# Patient Record
Sex: FEMALE | Race: WHITE | NOT HISPANIC OR LATINO | Employment: UNEMPLOYED | URBAN - METROPOLITAN AREA
[De-identification: names, ages, dates, MRNs, and addresses within clinical notes are randomized per-mention and may not be internally consistent; named-entity substitution may affect disease eponyms.]

---

## 2017-01-12 RX ORDER — ALENDRONATE SODIUM 70 MG/1
TABLET ORAL
Qty: 4 TABLET | Refills: 0 | OUTPATIENT
Start: 2017-01-12

## 2017-01-13 NOTE — TELEPHONE ENCOUNTER
Please contact patient.  She has not been seen in the office in more than 2 years.  She needs mammogram, repeat bone density test, and multiple vaccines.

## 2017-01-21 ENCOUNTER — PATIENT MESSAGE (OUTPATIENT)
Dept: FAMILY MEDICINE | Facility: CLINIC | Age: 62
End: 2017-01-21

## 2017-01-23 ENCOUNTER — PATIENT MESSAGE (OUTPATIENT)
Dept: FAMILY MEDICINE | Facility: CLINIC | Age: 62
End: 2017-01-23

## 2018-07-05 ENCOUNTER — OFFICE VISIT (OUTPATIENT)
Dept: URGENT CARE | Facility: CLINIC | Age: 63
End: 2018-07-05
Payer: MEDICARE

## 2018-07-05 VITALS
OXYGEN SATURATION: 98 % | HEIGHT: 64 IN | BODY MASS INDEX: 18.78 KG/M2 | TEMPERATURE: 99 F | RESPIRATION RATE: 18 BRPM | SYSTOLIC BLOOD PRESSURE: 136 MMHG | WEIGHT: 110 LBS | HEART RATE: 67 BPM | DIASTOLIC BLOOD PRESSURE: 72 MMHG

## 2018-07-05 DIAGNOSIS — M79.18 BUTTOCK PAIN: ICD-10-CM
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
## 2018-07-05 DIAGNOSIS — M54.32 SCIATICA OF LEFT SIDE: ICD-10-CM

## 2018-07-05 DIAGNOSIS — S80.12XA CONTUSION OF LEFT LOWER LEG, INITIAL ENCOUNTER: ICD-10-CM

## 2018-07-05 PROCEDURE — 99214 OFFICE O/P EST MOD 30 MIN: CPT | Mod: 25,S$GLB,, | Performed by: NURSE PRACTITIONER

## 2018-07-05 PROCEDURE — 96372 THER/PROPH/DIAG INJ SC/IM: CPT | Mod: S$GLB,,, | Performed by: NURSE PRACTITIONER

## 2018-07-05 RX ORDER — METHOCARBAMOL 500 MG/1
500 TABLET, FILM COATED ORAL 4 TIMES DAILY
Qty: 30 TABLET | Refills: 0 | Status: SHIPPED | OUTPATIENT
Start: 2018-07-05 | End: 2018-07-06 | Stop reason: SDUPTHER

## 2018-07-05 RX ORDER — ETODOLAC 400 MG/1
400 TABLET, FILM COATED ORAL EVERY 8 HOURS PRN
Qty: 21 TABLET | Refills: 0 | Status: SHIPPED | OUTPATIENT
Start: 2018-07-05 | End: 2018-07-12

## 2018-07-05 RX ORDER — KETOROLAC TROMETHAMINE 30 MG/ML
30 INJECTION, SOLUTION INTRAMUSCULAR; INTRAVENOUS
Status: COMPLETED | OUTPATIENT
Start: 2018-07-05 | End: 2018-07-05

## 2018-07-05 RX ADMIN — KETOROLAC TROMETHAMINE 30 MG: 30 INJECTION, SOLUTION INTRAMUSCULAR; INTRAVENOUS at 12:07

## 2018-07-05 NOTE — PROGRESS NOTES
"Subjective:       Patient ID: Niya Durbin is a 63 y.o. female.    Vitals:  height is 5' 4" (1.626 m) and weight is 49.9 kg (110 lb). Her temperature is 98.8 °F (37.1 °C). Her blood pressure is 136/72 and her pulse is 67. Her respiration is 18 and oxygen saturation is 98%.     Chief Complaint: Hip Pain    Pt c/o left hip pain radiating into buttock and left upper and lower leg since 9:30 pm last night after trip and fall over coffee table.  She denies hitting her head.  Denies headache, neck pain, or back pain.  She is able to bear weight on the extremity but this is painful.  Pain also occurs with bending or reaching.  Denies history of Sciatica.  Denies numbness, weakness, or loss of bowel/bladder control.        Hip Pain    The incident occurred 6 to 12 hours ago. The incident occurred at home. The injury mechanism was a direct blow and a fall. The pain is present in the left hip and left leg. The quality of the pain is described as stabbing and burning. The pain is at a severity of 10/10. The pain is severe. The pain has been fluctuating since onset. Pertinent negatives include no inability to bear weight, loss of motion, loss of sensation, muscle weakness, numbness or tingling. She reports no foreign bodies present. The symptoms are aggravated by movement and weight bearing. She has tried NSAIDs (200 mg Advil last night) for the symptoms. The treatment provided no relief.     Review of Systems   Constitution: Negative for weakness and malaise/fatigue.   HENT: Negative for nosebleeds.    Cardiovascular: Negative for chest pain and syncope.   Respiratory: Negative for shortness of breath.    Musculoskeletal: Positive for falls. Negative for back pain, joint pain and neck pain.   Gastrointestinal: Negative for abdominal pain.   Genitourinary: Negative for hematuria.   Neurological: Negative for dizziness, numbness and tingling.       Objective:      Physical Exam   Constitutional: She is oriented to person, " place, and time. Vital signs are normal. She appears well-developed and well-nourished. She is active and cooperative. No distress.   HENT:   Head: Normocephalic and atraumatic.   Nose: Nose normal.   Mouth/Throat: Oropharynx is clear and moist and mucous membranes are normal.   Eyes: Conjunctivae and lids are normal.   Neck: Trachea normal, normal range of motion, full passive range of motion without pain and phonation normal. Neck supple.   Cardiovascular: Normal rate, regular rhythm, normal heart sounds, intact distal pulses and normal pulses.    Pulmonary/Chest: Effort normal and breath sounds normal.   Abdominal: Soft. Normal appearance and bowel sounds are normal. She exhibits no abdominal bruit, no pulsatile midline mass and no mass.   Musculoskeletal: She exhibits no edema or deformity.        Cervical back: Normal.        Thoracic back: Normal.        Lumbar back: She exhibits pain and spasm. She exhibits normal range of motion, no tenderness, no bony tenderness, no swelling, no edema, no deformity, no laceration and normal pulse.        Back:    Pain with flexion.  Full ROM.  + Straight Leg Raise   Neurological: She is alert and oriented to person, place, and time. She has normal strength and normal reflexes. She displays no atrophy and no tremor. No cranial nerve deficit or sensory deficit. She exhibits normal muscle tone. Gait abnormal. Coordination normal.   Antalgic Gait   Skin: Skin is warm, dry and intact. Bruising noted. No abrasion, no ecchymosis and no laceration noted. She is not diaphoretic.        Psychiatric: She has a normal mood and affect. Her speech is normal and behavior is normal. Judgment and thought content normal. Cognition and memory are normal.   Nursing note and vitals reviewed.      X-ray Hip 2 Or 3 Views Left    Result Date: 7/5/2018  EXAMINATION: XR HIP 2 VIEW LEFT; XR FEMUR 2 VIEW LEFT CLINICAL HISTORY: Unspecified fall, initial encounter TECHNIQUE: AP view of the pelvis and  frog leg lateral view of the left hip and AP and lateral views of the left femur were performed. COMPARISON: None FINDINGS: Overall alignment is within normal limits.  No displaced fracture, dislocation or destructive osseous process.  Minimal degenerative change noted about the pelvis, bilateral hips and knee.  There is pseudoarthrosis at right L5-S1.  No subcutaneous emphysema or radiodense retained foreign body.     No acute displaced fracture-dislocation identified. Electronically signed by: Tejas Marks MD Date:    07/05/2018 Time:    12:15    X-ray Femur 2 View Left    Result Date: 7/5/2018  EXAMINATION: XR HIP 2 VIEW LEFT; XR FEMUR 2 VIEW LEFT CLINICAL HISTORY: Unspecified fall, initial encounter TECHNIQUE: AP view of the pelvis and frog leg lateral view of the left hip and AP and lateral views of the left femur were performed. COMPARISON: None FINDINGS: Overall alignment is within normal limits.  No displaced fracture, dislocation or destructive osseous process.  Minimal degenerative change noted about the pelvis, bilateral hips and knee.  There is pseudoarthrosis at right L5-S1.  No subcutaneous emphysema or radiodense retained foreign body.     No acute displaced fracture-dislocation identified. Electronically signed by: Tejas Marks MD Date:    07/05/2018 Time:    12:15  Assessment:       1. Fall, initial encounter    2. Buttock pain    3. Contusion of left lower leg, initial encounter    4. Sciatica of left side        Plan:         Fall, initial encounter  -     X-Ray Hip 2 or 3 views Left; Future; Expected date: 07/05/2018  -     X-Ray Femur 2 View Left; Future; Expected date: 07/05/2018    Buttock pain  -     ketorolac injection 30 mg; Inject 1 mL (30 mg total) into the muscle one time.  -     X-Ray Femur 2 View Left; Future; Expected date: 07/05/2018  -     methocarbamol (ROBAXIN) 500 MG Tab; Take 1 tablet (500 mg total) by mouth 4 (four) times daily. As needed for muscle spasm. May cause drowsiness  for 10 days  Dispense: 30 tablet; Refill: 0  -     etodolac (LODINE) 400 MG tablet; Take 1 tablet (400 mg total) by mouth every 8 (eight) hours as needed.  Dispense: 21 tablet; Refill: 0    Contusion of left lower leg, initial encounter  -     X-Ray Hip 2 or 3 views Left; Future; Expected date: 07/05/2018  -     X-Ray Femur 2 View Left; Future; Expected date: 07/05/2018    Sciatica of left side  -     methocarbamol (ROBAXIN) 500 MG Tab; Take 1 tablet (500 mg total) by mouth 4 (four) times daily. As needed for muscle spasm. May cause drowsiness for 10 days  Dispense: 30 tablet; Refill: 0  -     etodolac (LODINE) 400 MG tablet; Take 1 tablet (400 mg total) by mouth every 8 (eight) hours as needed.  Dispense: 21 tablet; Refill: 0      Patient Instructions                                                              Ortho   If your condition worsens or fails to improve we recommend that you receive another evaluation at the ER immediately or contact your PCP to discuss your concerns or return here. You must understand that you've received an urgent care treatment only and that you may be released before all your medical problems are known or treated. You the patient will arrange for follouw care as instructed.   If you were prescribed a narcotic or muscle relaxant do not drive or operate heavy machinery while taking these medications   Tylenol or ibuprofen can also be used as directed for pain unless you have an allergy to them or medical condition such as stomach ulcers, kidney or liver disease or blood thinners etc for which you should not be taking these type of medications.   If you were given a prescription NSAID here do not also take any over the counter NSAID like ibuprofen, aleve, advil, motrin etc   RICE which means rest, ice compression and elevation are helpful.   If you have Low Back Pain and develop bowel or bladder symptoms or increase pain going down your legs go to the ER immediately.        Sciatica    Sciatica is a condition that causes pain in the lower back that spreads down into the buttock, hip, and leg. Sometimes the leg pain can happen without any back pain. Sciatica happens when a spinal nerve is irritated or has pressure put on it as comes out of the spinal canal in the lower back. This most often happens when a bulge or rupture of a nearby spinal disk presses on the nerve. Sciatica can also be caused by a narrowing of the spinal canal (spinal stenosis) or spasm of the muscle in the buttocks that the sciatic nerve passes through (pyriform muscle). Sciatica is also called lumbar radiculopathy.  Sciatica may begin after a sudden twisting or bending force, such as in a car accident. Or it can happen after a simple awkward movement. In either case, muscle spasm often also happens. Muscle spasm makes the pain worse.  A healthcare provider makes a diagnosis of sciatica from your symptoms and a physical exam. Unless you had an injury from a car accident or fall, you usually wont have X-rays taken at this time. This is because the nerves and disks in your back cant be seen on an X-ray. If the provider sees signs of a compressed nerve, you will need to schedule an MRI scan as an outpatient. Signs of a compressed nerve include loss of strength in a leg.  Most sciatica gets better with medicine, exercise, and physical therapy. If your symptoms continue after at least 3 months of medical treatment, you may need surgery or injections to your lower back.  Home care  Follow these tips when caring for yourself at home:  · You may need to stay in bed the first few days. But as soon as possible, begin sitting up or walking. This will help you avoid problems that come from staying in bed for long periods.  · When in bed, try to find a position that is comfortable. A firm mattress is best. Try lying flat on your back with pillows under your knees. You can also try lying on your side with your knees bent  up toward your chest and a pillow between your knees.  · Avoid sitting for long periods. This puts more stress on your lower back than standing or walking.  · Use heat from a hot shower, hot bath, or heating pad to help ease pain. Massage can also help. You can also try using an ice pack. You can make your own ice pack by putting ice cubes in a plastic bag. Wrap the bag in a thin towel. Try both heat and cold to see which works best. Use the method that feels best for 20 minutes several times a day.  · You may use acetaminophen or ibuprofen to ease pain, unless another pain medicine was prescribed. Note: If you have chronic liver or kidney disease, talk with your healthcare provider before taking these medicines. Also talk with your provider if youve had a stomach ulcer or gastrointestinal bleeding.  · Use safe lifting methods. Dont lift anything heavier than 15 pounds until all of the pain is gone.  Follow-up care  Follow up with your healthcare provider, or as advised. You may need physical therapy or additional tests.  If X-rays were taken, a radiologist will look at them. You will be told of any new findings that may affect your care.  When to seek medical advice  Call your healthcare provider right away if any of these occur:  · Pain gets worse even after taking prescribed medicine  · Weakness or numbness in 1 or both legs or hips  · Numbness in your groin or genital area  · You cant control your bowel or bladder  · Fever  · Redness or swelling over your back or spine   Date Last Reviewed: 8/1/2016  © 0935-2174 [employer]. [address] [place] [address], [place] [address]. All rights reserved. This information is not intended as a substitute for professional medical care. Always follow your healthcare professional's instructions.

## 2018-07-05 NOTE — PATIENT INSTRUCTIONS
Ortho   If your condition worsens or fails to improve we recommend that you receive another evaluation at the ER immediately or contact your PCP to discuss your concerns or return here. You must understand that you've received an urgent care treatment only and that you may be released before all your medical problems are known or treated. You the patient will arrange for follouwp care as instructed.   If you were prescribed a narcotic or muscle relaxant do not drive or operate heavy machinery while taking these medications   Tylenol or ibuprofen can also be used as directed for pain unless you have an allergy to them or medical condition such as stomach ulcers, kidney or liver disease or blood thinners etc for which you should not be taking these type of medications.   If you were given a prescription NSAID here do not also take any over the counter NSAID like ibuprofen, aleve, advil, motrin etc   RICE which means rest, ice compression and elevation are helpful.   If you have Low Back Pain and develop bowel or bladder symptoms or increase pain going down your legs go to the ER immediately.       Sciatica    Sciatica is a condition that causes pain in the lower back that spreads down into the buttock, hip, and leg. Sometimes the leg pain can happen without any back pain. Sciatica happens when a spinal nerve is irritated or has pressure put on it as comes out of the spinal canal in the lower back. This most often happens when a bulge or rupture of a nearby spinal disk presses on the nerve. Sciatica can also be caused by a narrowing of the spinal canal (spinal stenosis) or spasm of the muscle in the buttocks that the sciatic nerve passes through (pyriform muscle). Sciatica is also called lumbar radiculopathy.  Sciatica may begin after a sudden twisting or bending force, such as in a car accident. Or it can happen after a simple awkward movement. In either case,  muscle spasm often also happens. Muscle spasm makes the pain worse.  A healthcare provider makes a diagnosis of sciatica from your symptoms and a physical exam. Unless you had an injury from a car accident or fall, you usually wont have X-rays taken at this time. This is because the nerves and disks in your back cant be seen on an X-ray. If the provider sees signs of a compressed nerve, you will need to schedule an MRI scan as an outpatient. Signs of a compressed nerve include loss of strength in a leg.  Most sciatica gets better with medicine, exercise, and physical therapy. If your symptoms continue after at least 3 months of medical treatment, you may need surgery or injections to your lower back.  Home care  Follow these tips when caring for yourself at home:  · You may need to stay in bed the first few days. But as soon as possible, begin sitting up or walking. This will help you avoid problems that come from staying in bed for long periods.  · When in bed, try to find a position that is comfortable. A firm mattress is best. Try lying flat on your back with pillows under your knees. You can also try lying on your side with your knees bent up toward your chest and a pillow between your knees.  · Avoid sitting for long periods. This puts more stress on your lower back than standing or walking.  · Use heat from a hot shower, hot bath, or heating pad to help ease pain. Massage can also help. You can also try using an ice pack. You can make your own ice pack by putting ice cubes in a plastic bag. Wrap the bag in a thin towel. Try both heat and cold to see which works best. Use the method that feels best for 20 minutes several times a day.  · You may use acetaminophen or ibuprofen to ease pain, unless another pain medicine was prescribed. Note: If you have chronic liver or kidney disease, talk with your healthcare provider before taking these medicines. Also talk with your provider if youve had a stomach ulcer or  gastrointestinal bleeding.  · Use safe lifting methods. Dont lift anything heavier than 15 pounds until all of the pain is gone.  Follow-up care  Follow up with your healthcare provider, or as advised. You may need physical therapy or additional tests.  If X-rays were taken, a radiologist will look at them. You will be told of any new findings that may affect your care.  When to seek medical advice  Call your healthcare provider right away if any of these occur:  · Pain gets worse even after taking prescribed medicine  · Weakness or numbness in 1 or both legs or hips  · Numbness in your groin or genital area  · You cant control your bowel or bladder  · Fever  · Redness or swelling over your back or spine   Date Last Reviewed: 8/1/2016  © 2262-9626 [employer]. [address] [place] [address], [place] [address]. All rights reserved. This information is not intended as a substitute for professional medical care. Always follow your healthcare professional's instructions.

## 2018-07-06 ENCOUNTER — TELEPHONE (OUTPATIENT)
Dept: URGENT CARE | Facility: CLINIC | Age: 63
End: 2018-07-06

## 2018-07-06 DIAGNOSIS — M79.18 BUTTOCK PAIN: ICD-10-CM

## 2018-07-06 DIAGNOSIS — M54.32 SCIATICA OF LEFT SIDE: ICD-10-CM

## 2018-07-06 RX ORDER — TIZANIDINE 4 MG/1
4 TABLET ORAL 3 TIMES DAILY PRN
Qty: 30 TABLET | Refills: 0 | Status: SHIPPED | OUTPATIENT
Start: 2018-07-06 | End: 2018-07-24

## 2018-07-06 RX ORDER — METHOCARBAMOL 500 MG/1
500 TABLET, FILM COATED ORAL 4 TIMES DAILY
Qty: 30 TABLET | Refills: 0 | Status: SHIPPED | OUTPATIENT
Start: 2018-07-06 | End: 2018-07-16

## 2018-07-06 NOTE — TELEPHONE ENCOUNTER
Pt called [facility] is currently out of stock (backorder) of Robaxin. Called [facility]an[facility]lex in for patient

## 2018-07-13 ENCOUNTER — TELEPHONE (OUTPATIENT)
Dept: URGENT CARE | Facility: CLINIC | Age: 63
End: 2018-07-13
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
## 2018-07-13 NOTE — TELEPHONE ENCOUNTER
I spoke with patient in regards to this.  She now has pain in her inner groin/thigh with increasing bruising.  I last saw her over a week ago.  I advised her to get rechecked as she now feels like this is different and more of a strain to her muscle than Sciatica.  She states that she's walking better and does not need to come in today for new visit but that she would appreciate a referral through Ortho.   called to schedule.       ----- Message from Mayra Benedict MA sent at 7/13/2018  2:50 PM CDT -----  Contact: Patient  Patient has concern of her bruise getting bigger. Patient was seen 7/5

## 2018-07-24 ENCOUNTER — HOSPITAL ENCOUNTER (OUTPATIENT)
Dept: RADIOLOGY | Facility: HOSPITAL | Age: 63
Discharge: HOME | End: 2018-07-24
Attending: PHYSICIAN ASSISTANT
Payer: MEDICARE

## 2018-07-24 ENCOUNTER — OFFICE VISIT (OUTPATIENT)
Dept: ORTHOPEDICS | Facility: CLINIC | Age: 63
End: 2018-07-24
Payer: MEDICARE

## 2018-07-24 VITALS — WEIGHT: 125.88 LBS | BODY MASS INDEX: 21.49 KG/M2 | HEIGHT: 64 IN

## 2018-07-24 DIAGNOSIS — M25.552 LEFT HIP PAIN: ICD-10-CM

## 2018-07-24 DIAGNOSIS — M25.552 LEFT HIP PAIN: Primary | ICD-10-CM

## 2018-07-24 PROCEDURE — 99203 OFFICE O/P NEW LOW 30 MIN: CPT | Mod: S$PBB,,, | Performed by: PHYSICIAN ASSISTANT

## 2018-07-24 PROCEDURE — 72195 MRI PELVIS W/O DYE: CPT | Mod: 26,,, | Performed by: RADIOLOGY

## 2018-07-24 PROCEDURE — 72195 MRI PELVIS W/O DYE: CPT | Mod: TC

## 2018-07-24 PROCEDURE — 99999 PR PBB SHADOW E&M-EST. PATIENT-LVL III: CPT | Mod: PBBFAC,,, | Performed by: PHYSICIAN ASSISTANT

## 2018-07-24 PROCEDURE — 99213 OFFICE O/P EST LOW 20 MIN: CPT | Mod: PBBFAC,25 | Performed by: PHYSICIAN ASSISTANT

## 2018-07-24 NOTE — LETTER
July 24, 2018      Aylin Celis, NP  [address] [facility] [place] [address]           [facility]  [address] [facility] [address]  Phone: [phone]  Fax: [phone]          Patient: Niya Durbin   MR Number: [id number]   YOB: 1955   Date of Visit: 7/24/2018       Dear Aylin Celis:    Thank you for referring Niya Durbin to me for evaluation. Attached you will find relevant portions of my assessment and plan of care.    If you have questions, please do not hesitate to call me. I look forward to following Niya Durbin along with you.    Sincerely,    Jeannie Page PA-C    Enclosure  CC:  No Recipients    If you would like to receive this communication electronically, please contact [email or web address] or [phone] to request more information on [employer] access.    For providers and/or their staff who would like to refer a patient to [facility], please contact us through our one-stop-shop provider referral line, [facility], at [phone].    If you feel you have received this communication in error or would no longer like to receive these types of communications, please e-mail [email or web address]

## 2018-07-24 NOTE — PROGRESS NOTES
Subjective:      Patient ID: Niya Durbin is a 63 y.o. female.    Chief Complaint: Pain of the Left Thigh    HPI  63 year old female presents with chief complaint of left hip and thigh pain since 7/4/18. She fell in her house. She could barely walk initially. She went to urgent care the next day and x-rays were negative for fx. She reports most of her pain at the groin and inner thigh. She has taken muscle relaxer and nsaids with little relief. She can't lay on her left side. She is using a crutch to walk. She reports bruising at the thigh. She reports LBP.   Review of Systems   Constitution: Negative for chills, fever and night sweats.   Cardiovascular: Negative for chest pain.   Respiratory: Negative for cough and shortness of breath.    Hematologic/Lymphatic: Does not bruise/bleed easily.   Gastrointestinal: Negative for heartburn.   Genitourinary: Negative for dysuria.   Neurological: Negative for numbness and paresthesias.   Psychiatric/Behavioral: Negative for altered mental status.   Allergic/Immunologic: Negative for persistent infections.         Objective:            General    Vitals reviewed.  Constitutional: She is oriented to person, place, and time. She appears well-developed and well-nourished.   Cardiovascular: Normal rate.    Neurological: She is alert and oriented to person, place, and time.         Left Hip Exam     Inspection   Bruising: present (thigh)    Tenderness   The patient tender to palpation of the trochanteric bursa.    Range of Motion   The patient has normal left hip ROM.    Tests   Stinchfield test: positive  Log Roll: negative    Other   Sensation: normal          Vascular Exam       Left Pulses  Dorsalis Pedis:      2+              X-ray: reviewed by myself. Overall alignment is within normal limits.  No displaced fracture, dislocation or destructive osseous process.  Minimal degenerative change noted about the pelvis, bilateral hips.        Assessment:       Encounter  Diagnosis   Name Primary?    Left hip pain Yes          Plan:       MRI was ordered to r/o occult hip fracture. RTC pending results.

## 2018-07-26 ENCOUNTER — TELEPHONE (OUTPATIENT)
Dept: ORTHOPEDICS | Facility: CLINIC | Age: 63
End: 2018-07-26

## 2018-07-26 ENCOUNTER — HOSPITAL ENCOUNTER (OUTPATIENT)
Dept: RADIOLOGY | Facility: HOSPITAL | Age: 63
Discharge: HOME | End: 2018-07-26
Attending: PHYSICIAN ASSISTANT
Payer: MEDICARE

## 2018-07-26 DIAGNOSIS — M84.48XA BILATERAL SACRAL INSUFFICIENCY FRACTURE, INITIAL ENCOUNTER: ICD-10-CM

## 2018-07-26 DIAGNOSIS — M84.48XA BILATERAL SACRAL INSUFFICIENCY FRACTURE, INITIAL ENCOUNTER: Primary | ICD-10-CM

## 2018-07-26 DIAGNOSIS — S32.402A CLOSED NONDISPLACED FRACTURE OF LEFT ACETABULUM, UNSPECIFIED PORTION OF ACETABULUM, INITIAL ENCOUNTER: Primary | ICD-10-CM

## 2018-07-26 PROCEDURE — 72192 CT PELVIS W/O DYE: CPT | Mod: TC

## 2018-07-26 PROCEDURE — 72192 CT PELVIS W/O DYE: CPT | Mod: 26,,, | Performed by: RADIOLOGY

## 2018-07-26 RX ORDER — HYDROCODONE BITARTRATE AND ACETAMINOPHEN 5; 325 MG/1; MG/1
1 TABLET ORAL EVERY 4 HOURS PRN
Qty: 42 TABLET | Refills: 0 | Status: SHIPPED | OUTPATIENT
Start: 2018-07-26 | End: 2018-08-05

## 2018-07-27 NOTE — TELEPHONE ENCOUNTER
Spoke to patient regarding MRI/CT results. She may WBAT. Prescription for Norco 5 mg provided. She says her pain is getting better. She will f/u in 4 weeks for pelvis x-rays.

## 2018-08-28 ENCOUNTER — OFFICE VISIT (OUTPATIENT)
Dept: ORTHOPEDICS | Facility: CLINIC | Age: 63
End: 2018-08-28
Attending: FAMILY MEDICINE
Payer: MEDICARE

## 2018-08-28 ENCOUNTER — HOSPITAL ENCOUNTER (OUTPATIENT)
Dept: RADIOLOGY | Facility: HOSPITAL | Age: 63
Discharge: HOME | End: 2018-08-28
Attending: PHYSICIAN ASSISTANT
Payer: MEDICARE

## 2018-08-28 ENCOUNTER — OFFICE VISIT (OUTPATIENT)
Dept: ORTHOPEDICS | Facility: CLINIC | Age: 63
End: 2018-08-28
Payer: MEDICARE

## 2018-08-28 VITALS — WEIGHT: 127.38 LBS | HEIGHT: 64 IN | BODY MASS INDEX: 21.75 KG/M2

## 2018-08-28 VITALS
DIASTOLIC BLOOD PRESSURE: 91 MMHG | SYSTOLIC BLOOD PRESSURE: 140 MMHG | HEIGHT: 64 IN | BODY MASS INDEX: 21.87 KG/M2 | HEART RATE: 55 BPM

## 2018-08-28 DIAGNOSIS — S32.402D CLOSED NONDISPLACED FRACTURE OF LEFT ACETABULUM WITH ROUTINE HEALING, UNSPECIFIED PORTION OF ACETABULUM, SUBSEQUENT ENCOUNTER: ICD-10-CM

## 2018-08-28 DIAGNOSIS — M81.6 LOCALIZED OSTEOPOROSIS OF LEQUESNE: ICD-10-CM

## 2018-08-28 DIAGNOSIS — M81.0 OSTEOPOROSIS, UNSPECIFIED OSTEOPOROSIS TYPE, UNSPECIFIED PATHOLOGICAL FRACTURE PRESENCE: ICD-10-CM

## 2018-08-28 DIAGNOSIS — S32.592D CLOSED FRACTURE OF RAMUS OF LEFT PUBIS WITH ROUTINE HEALING, SUBSEQUENT ENCOUNTER: ICD-10-CM

## 2018-08-28 DIAGNOSIS — S32.402D CLOSED NONDISPLACED FRACTURE OF LEFT ACETABULUM WITH ROUTINE HEALING, UNSPECIFIED PORTION OF ACETABULUM, SUBSEQUENT ENCOUNTER: Primary | ICD-10-CM

## 2018-08-28 DIAGNOSIS — M80.80XA PATHOLOGICAL FRACTURE DUE TO OSTEOPOROSIS, UNSPECIFIED FRACTURE SITE, UNSPECIFIED OSTEOPOROSIS TYPE, INITIAL ENCOUNTER: Primary | ICD-10-CM

## 2018-08-28 PROCEDURE — 99999 PR PBB SHADOW E&M-EST. PATIENT-LVL II: CPT | Mod: PBBFAC,,, | Performed by: PHYSICIAN ASSISTANT

## 2018-08-28 PROCEDURE — 99213 OFFICE O/P EST LOW 20 MIN: CPT | Mod: S$PBB,,, | Performed by: PHYSICIAN ASSISTANT

## 2018-08-28 PROCEDURE — 99212 OFFICE O/P EST SF 10 MIN: CPT | Mod: PBBFAC,25,27

## 2018-08-28 PROCEDURE — 72190 X-RAY EXAM OF PELVIS: CPT | Mod: TC

## 2018-08-28 PROCEDURE — 72190 X-RAY EXAM OF PELVIS: CPT | Mod: 26,,, | Performed by: RADIOLOGY

## 2018-08-28 PROCEDURE — 99214 OFFICE O/P EST MOD 30 MIN: CPT | Mod: 25,S$PBB,, | Performed by: PHYSICIAN ASSISTANT

## 2018-08-28 PROCEDURE — 99212 OFFICE O/P EST SF 10 MIN: CPT | Mod: PBBFAC,25 | Performed by: PHYSICIAN ASSISTANT

## 2018-08-28 NOTE — PROGRESS NOTES
SUBJECTIVE:     Chief Complaint & History of Present Illness:  Niya Durbin is a new patient 63 y.o. female who is seen here today for a bone health evaluation for osteoporosis, fracture prevention, and risk evaluation for future fractures.        she was appropriately identified and referred by Jeannie Page PA-C due to concerns for compromised bone quality, and risk of future fractures.  This visit is medically necessary to identify risk, investigate and initiative treatment as appropriate to improve bone quality and strength for the reduction of secondary fractures.     her medical history, medications and fracture history will be reviewed and summarized      Review of patient's allergies indicates:  No Known Allergies      No current outpatient medications on file.     No current facility-administered medications for this visit.        History reviewed. No pertinent past medical history.    Past Surgical History:   Procedure Laterality Date    APPENDECTOMY  age 10    TOTAL ABDOMINAL HYSTERECTOMY  age 35    w/ USO       Lab Results   Component Value Date     11/13/2014    K 4.9 11/13/2014     11/13/2014    CO2 29 11/13/2014    GLU 86 11/13/2014    BUN 17 11/13/2014    CREATININE 0.8 11/13/2014    CALCIUM 9.3 11/13/2014    PROT 6.9 11/13/2014    ALBUMIN 4.1 11/13/2014    BILITOT 0.8 11/13/2014    ALKPHOS 68 11/13/2014    AST 19 11/13/2014    ALT 16 11/13/2014    ANIONGAP 8 11/13/2014    ESTGFRAFRICA >60.0 11/13/2014    EGFRNONAA >60.0 11/13/2014      Lab Results   Component Value Date    WBC 3.24 (L) 11/13/2014    RBC 4.40 11/13/2014    HGB 13.3 11/13/2014    HCT 37.9 11/13/2014    MCV 86 11/13/2014    MCH 30.2 11/13/2014    MCHC 35.1 11/13/2014    RDW 13.2 11/13/2014     11/13/2014    MPV 9.4 11/13/2014    GRAN 1.7 (L) 11/13/2014    GRAN 52.5 11/13/2014    LYMPH 1.1 11/13/2014    LYMPH 32.7 11/13/2014    MONO 0.3 11/13/2014    MONO 10.2 11/13/2014    EOS 0.1 11/13/2014    BASO 0.02  11/13/2014    EOSINOPHIL 3.7 11/13/2014    BASOPHIL 0.6 11/13/2014    DIFFMETHOD Automated 11/13/2014      No results found for: MG   No results found for: PHOS   No results found for: [id number]   No results found for: PTH   Lab Results   Component Value Date    TSH 5.389 (H) 11/13/2014      Lab Results   Component Value Date    FREET4 0.92 11/13/2014      No results found for: HGBA1C, ESTIMATEDAVG   No results found for: FREETESTOSTE         Vital Signs (Most Recent)  Vitals:    08/28/18 1214   BP: (!) 140/91   Pulse: (!) 55         Today's Visit and Bone Health History  Have you had any loss of height or gotten shorter since your 20's?  1   Are you postmenopausal?  Surgical hysterectomy with overies removed   Please indicate at what age you became postmenopausal. hysterectomy and partial oophorectomy  in 1990   Have you ever taken any type of hormone replacement therapy? No   If you have had hormone replacement therapy please indicate which hormone therapy was used and for how long. n/a   Do you currently smoke? No   Have you ever smoked in the past? Yes   How many alcoholic beverages do you drink per day? 1 to 3   How many caffeinated beverages do you drink per day? 1 to 3   Have you had 2 or more falls in the past 12 months? No   How active have you been in the past 12 months? Somewhat active ( walk up to 1 mile per day )   Did either of your parents have a hip fracture after the age of 50? No   Have you ever been diagnosed with any of the following? Hypothyroidism   Do you currently have a fracture? Yes   If you currently have a fracture please indicate where and when the fracture occured. hip   Have you broken any other bones since you turned 50 or older? No   Please list all bones broken since you turned 50 or older. n/a   Have you ever had a bone density test or DXA scan? Yes   Are you currently taking or have you ever taken any of the following medications? None   Do you take Calcium? No   If you take  "Calcium what dose? n/a   Do you take Vitamin D? No   If you take Vitamin D what dose? n/a   Have you ever been diagnosed with cancer? No   Please indicate what type of cancer and when you were diagnosed. n/a   Have you ever been treated for cancer with high beam radiation or had radioactive implants? No   If you have been treated for cancer with high beam radiation or had radioactive implants please indicate what type.        she denies exposure to high beam radiation, or radioactive implants, no history of elevated calcium levels of Paget's disease.     she has medical history and medication use known to be associated with bone loss and osteoporosis to include pathological pelvic fracture previous diagnosis of osteoporosis     The last DXA was performed in 11/11/2014.          T-Score Hip: -2.8     T-Score Spine: -1.9      FRAX:            Review of Systems:  ROS:  Constitutional: no fever or chills  Eyes: no visual changes  ENT: no nasal congestion or sore throat  Respiratory: no respiratory symptoms  Cardiovascular: no chest pain or palpitations  Gastrointestinal: no nausea or vomiting, tolerating diet  Genitourinary: no hematuria or dysuria  Integument/Breast: no rash or pruritis  Hematologic/Lymphatic: no easy bruising or lymphadenopathy  Musculoskeletal: no arthralgias or myalgias  Neurological: no seizures or tremors  Behavioral/Psych: no auditory or visual hallucinations  Endocrine: no heat or cold intolerance      OBJECTIVE:     PHYSICAL EXAM:  Height: 5' 4" (162.6 cm)  ,                  General: no acute distress and well developed/well nourished  Behavioral/Psych: normal judgment and insight  Skin: no rash  Head/Neck: atraumatic, normocephalic, without obvious abnormality  Respiratory: normal respiratory effort  Cardiac: regular rate and rhythm  Vascular: pulses present  Abdomen: soft, non-tender  Musculoskeletal: no joint tenderness, deformity or swelling               ASSESSMENT/PLAN: "     Assessment:    Osteoporosis, at high risk for future fractures, history of pathological pelvic fracture.    Plan:   30-45 minutes spent in face-to-face consultation with patient and her family members today, discussing the disease management of osteoporosis, for the reduction of future fractures.  I have explained that bone strength is equal to bone quality. A bone density / DEXA scan is important to complement her care since her fracture was by definition a fragility fracture/traumatic fracture.  Over half of the encounter was spent (50%) counseling the patient on the disease of osteoporosis evidence-based and best practice treatment options available as well as recommendations for improvement of bone quality, the importance of nutritional supplements to include:  Calcium 4627-3036 mg daily in divided doses   Vitamin D3  6347-0484 units daily in divided doses.   Fall prevention and personal safety for the reduction of future fractures.    Clinicians Guidelines for the treatment of Osteoporosis 2017 as part of the National Osteoporosis Foundation were utilized as the evidence-based information provided.    Discussed pharmaceutical treatment options to include Biphosphatases, Denosumab, Abaloparatide and Teriparatide. Information to include indications for therapy, risks and benefits to treatment, and important safety information related to these treatments was provided and discussed.  Handouts were given to the patient for her review on osteoporosis and other pharmacological treatment information related to other available treatment options.    The importance of diet, impact exercise, and core strengthening with gait and balance exercise, through  both formal physical therapy programs and home exercise programs was discussed.     Bone density test recommended and ordered  Bone health labs recommended and ordered    The will see her back following testing to discuss treatment options

## 2018-08-28 NOTE — PROGRESS NOTES
Subjective:      Patient ID: Niya Durbin is a 63 y.o. female.    Chief Complaint: No chief complaint on file.    HPI  Patient returns for f/u for her left acetabulum and pubic ramus fractures that occurred on 7/4/18. Her pain has gotten better since last visit. She still reports some soreness at the groin and intermittent LBP. She has not been using the crutches much in the past 5 days. She hasn't taken the Norco in the past 5 days.   Review of Systems   Constitution: Negative for chills, fever and night sweats.   Cardiovascular: Negative for chest pain.   Respiratory: Negative for cough and shortness of breath.    Hematologic/Lymphatic: Does not bruise/bleed easily.   Skin: Negative for color change.   Gastrointestinal: Negative for heartburn.   Genitourinary: Negative for dysuria.   Neurological: Negative for numbness and paresthesias.   Psychiatric/Behavioral: Negative for altered mental status.   Allergic/Immunologic: Negative for persistent infections.         Objective:            General    Vitals reviewed.  Constitutional: She is oriented to person, place, and time. She appears well-developed and well-nourished.   Cardiovascular: Normal rate.    Neurological: She is alert and oriented to person, place, and time.         Left Hip Exam     Range of Motion   The patient has normal left hip ROM.    Tests   Stinchfield test: positive (mild)  Log Roll: negative    Other   Sensation: normal    Comments:  Mild pain with hip ROM.           Vascular Exam       Left Pulses  Dorsalis Pedis:      2+              X-ray: ordered and reviewed by myself. Healing left acetabular fracture and fracture of the left pubic ramus identified as before.  The position alignment is satisfactory.  Mild DJD.  No bone destruction.        Assessment:       Encounter Diagnoses   Name Primary?    Closed nondisplaced fracture of left acetabulum with routine healing, unspecified portion of acetabulum, subsequent encounter Yes    Closed  fracture of ramus of left pubis with routine healing, subsequent encounter           Plan:       Patient's pain is getting better. She declined refill of [place]. She declined cane/walker. Continue WBAT. May take tylenol prn. RTC in 4 weeks for repeat pelvis x-ray.

## 2018-09-25 ENCOUNTER — OFFICE VISIT (OUTPATIENT)
Dept: ORTHOPEDICS | Facility: CLINIC | Age: 63
End: 2018-09-25
Payer: MEDICARE

## 2018-09-25 ENCOUNTER — HOSPITAL ENCOUNTER (OUTPATIENT)
Dept: RADIOLOGY | Facility: HOSPITAL | Age: 63
Discharge: HOME | End: 2018-09-25
Attending: PHYSICIAN ASSISTANT
Payer: MEDICARE

## 2018-09-25 ENCOUNTER — HOSPITAL ENCOUNTER (OUTPATIENT)
Dept: RADIOLOGY | Facility: CLINIC | Age: 63
Discharge: HOME | End: 2018-09-25
Attending: PHYSICIAN ASSISTANT
Payer: MEDICARE

## 2018-09-25 VITALS
WEIGHT: 128.88 LBS | HEART RATE: 62 BPM | DIASTOLIC BLOOD PRESSURE: 92 MMHG | HEIGHT: 64 IN | SYSTOLIC BLOOD PRESSURE: 157 MMHG | BODY MASS INDEX: 22 KG/M2

## 2018-09-25 DIAGNOSIS — S32.592D CLOSED FRACTURE OF RAMUS OF LEFT PUBIS WITH ROUTINE HEALING, SUBSEQUENT ENCOUNTER: ICD-10-CM

## 2018-09-25 DIAGNOSIS — S32.402D CLOSED NONDISPLACED FRACTURE OF LEFT ACETABULUM WITH ROUTINE HEALING, UNSPECIFIED PORTION OF ACETABULUM, SUBSEQUENT ENCOUNTER: ICD-10-CM

## 2018-09-25 DIAGNOSIS — M81.0 OSTEOPOROSIS, UNSPECIFIED OSTEOPOROSIS TYPE, UNSPECIFIED PATHOLOGICAL FRACTURE PRESENCE: ICD-10-CM

## 2018-09-25 DIAGNOSIS — S32.402D CLOSED NONDISPLACED FRACTURE OF LEFT ACETABULUM WITH ROUTINE HEALING, UNSPECIFIED PORTION OF ACETABULUM, SUBSEQUENT ENCOUNTER: Primary | ICD-10-CM

## 2018-09-25 DIAGNOSIS — M80.80XA PATHOLOGICAL FRACTURE DUE TO OSTEOPOROSIS, UNSPECIFIED FRACTURE SITE, UNSPECIFIED OSTEOPOROSIS TYPE, INITIAL ENCOUNTER: Primary | ICD-10-CM

## 2018-09-25 DIAGNOSIS — M81.6 LOCALIZED OSTEOPOROSIS OF LEQUESNE: ICD-10-CM

## 2018-09-25 DIAGNOSIS — M80.80XA PATHOLOGICAL FRACTURE DUE TO OSTEOPOROSIS, UNSPECIFIED FRACTURE SITE, UNSPECIFIED OSTEOPOROSIS TYPE, INITIAL ENCOUNTER: ICD-10-CM

## 2018-09-25 PROCEDURE — 99213 OFFICE O/P EST LOW 20 MIN: CPT | Mod: PBBFAC,25 | Performed by: PHYSICIAN ASSISTANT

## 2018-09-25 PROCEDURE — 72190 X-RAY EXAM OF PELVIS: CPT | Mod: 26,,, | Performed by: RADIOLOGY

## 2018-09-25 PROCEDURE — 77080 DXA BONE DENSITY AXIAL: CPT | Mod: 26,,, | Performed by: INTERNAL MEDICINE

## 2018-09-25 PROCEDURE — 99999 PR PBB SHADOW E&M-EST. PATIENT-LVL III: CPT | Mod: PBBFAC,,, | Performed by: PHYSICIAN ASSISTANT

## 2018-09-25 PROCEDURE — 77080 DXA BONE DENSITY AXIAL: CPT | Mod: TC

## 2018-09-25 PROCEDURE — 99213 OFFICE O/P EST LOW 20 MIN: CPT | Mod: S$PBB,,, | Performed by: PHYSICIAN ASSISTANT

## 2018-09-25 PROCEDURE — 72190 X-RAY EXAM OF PELVIS: CPT | Mod: TC

## 2018-09-25 PROCEDURE — 99999 PR PBB SHADOW E&M-EST. PATIENT-LVL I: CPT | Mod: PBBFAC,,, | Performed by: PHYSICIAN ASSISTANT

## 2018-09-25 PROCEDURE — 99211 OFF/OP EST MAY X REQ PHY/QHP: CPT | Mod: PBBFAC,25,27

## 2018-09-25 NOTE — PROGRESS NOTES
Subjective:      Patient ID: Niya Durbin is a 63 y.o. female.    Chief Complaint: No chief complaint on file.    HPI  Patient returns for f/u for her left acetabulum and pubic ramus fractures that occurred on 7/4/18. Her pain continues to get better. She says her groin is almost back to normal. Her lower back is sore since she overdid it with house work a few days ago. She takes tylenol with mild relief.   Review of Systems   Constitution: Negative for chills, fever and night sweats.   Cardiovascular: Negative for chest pain.   Respiratory: Negative for cough and shortness of breath.    Hematologic/Lymphatic: Does not bruise/bleed easily.   Skin: Negative for color change.   Gastrointestinal: Negative for heartburn.   Genitourinary: Negative for dysuria.   Neurological: Negative for numbness and paresthesias.   Psychiatric/Behavioral: Negative for altered mental status.   Allergic/Immunologic: Negative for persistent infections.         Objective:            General    Vitals reviewed.  Constitutional: She is oriented to person, place, and time. She appears well-developed and well-nourished.   Cardiovascular: Normal rate.    Neurological: She is alert and oriented to person, place, and time.         Left Hip Exam     Range of Motion   The patient has normal left hip ROM.    Tests   Stinchfield test: negative  Log Roll: negative    Other   Sensation: normal    Comments:  Mild pain with hip ROM.           Vascular Exam       Left Pulses  Dorsalis Pedis:      2+              X-ray: ordered and reviewed by myself. Fracture of the left acetabulum and pubic rami remain in satisfactory position and alignment.  No new findings.         Assessment:       Encounter Diagnoses   Name Primary?    Closed nondisplaced fracture of left acetabulum with routine healing, unspecified portion of acetabulum, subsequent encounter Yes    Closed fracture of ramus of left pubis with routine healing, subsequent encounter            Plan:       Patient is getting better. She will gradually return to walking/low impact exercise. She may take aleve or tylenol. RTC prn.

## 2018-09-25 NOTE — PROGRESS NOTES
Chief Complaint & History of Present Illness:  Niya Durbin is a established patient 63 y.o. female who is seen here today for review of lab results, DEXA scan results, and determine a treatment plan for her osteoporosis.  she has reviewed the information provided at her initial visit.  After review of treatment options together we has elected to proceed with Prolia as her treatment option today for her osteoporosis and to reduce risk of future fractures.        Review of patient's allergies indicates:  No Known Allergies      No current outpatient medications on file.     No current facility-administered medications for this visit.        History reviewed. No pertinent past medical history.    Past Surgical History:   Procedure Laterality Date    APPENDECTOMY  age 10    TOTAL ABDOMINAL HYSTERECTOMY  age 35    w/ USO       Lab Results   Component Value Date     08/28/2018    K 4.4 08/28/2018     08/28/2018    CO2 30 (H) 08/28/2018    GLU 90 08/28/2018    BUN 15 08/28/2018    CREATININE 0.8 08/28/2018    CALCIUM 10.0 08/28/2018    PROT 7.0 08/28/2018    ALBUMIN 4.2 08/28/2018    BILITOT 0.8 08/28/2018    ALKPHOS 107 08/28/2018    AST 17 08/28/2018    ALT 15 08/28/2018    ANIONGAP 7 (L) 08/28/2018    ESTGFRAFRICA >60.0 08/28/2018    EGFRNONAA >60.0 08/28/2018      Lab Results   Component Value Date    WBC 3.24 (L) 11/13/2014    RBC 4.40 11/13/2014    HGB 13.3 11/13/2014    HCT 37.9 11/13/2014    MCV 86 11/13/2014    MCH 30.2 11/13/2014    MCHC 35.1 11/13/2014    RDW 13.2 11/13/2014     11/13/2014    MPV 9.4 11/13/2014    GRAN 1.7 (L) 11/13/2014    GRAN 52.5 11/13/2014    LYMPH 1.1 11/13/2014    LYMPH 32.7 11/13/2014    MONO 0.3 11/13/2014    MONO 10.2 11/13/2014    EOS 0.1 11/13/2014    BASO 0.02 11/13/2014    EOSINOPHIL 3.7 11/13/2014    BASOPHIL 0.6 11/13/2014    DIFFMETHOD Automated 11/13/2014      No results found for: MG   No results found for: PHOS   Lab Results   Component Value Date     [id number] 28 (L) 08/28/2018      Lab Results   Component Value Date    PTH 72.0 08/28/2018      Lab Results   Component Value Date    TSH 5.207 (H) 08/28/2018      Lab Results   Component Value Date    FREET4 0.92 08/28/2018      No results found for: HGBA1C, ESTIMATEDAVG   No results found for: FREETESTOSTE     DEXA Scan was reviewed and demonstrates :     T-Score Hip: -3.2     T-Score Spine: -1.6      FRAX:      Major Fx.: 16%         Hip Fx.: 4.8%      Vital Signs (Most Recent)  There were no vitals filed for this visit.      Review of Systems:  ROS:  Constitutional: no fever or chills  Eyes: no visual changes  ENT: no nasal congestion or sore throat  Respiratory: no respiratory symptoms  Cardiovascular: no chest pain or palpitations  Gastrointestinal: no nausea or vomiting, tolerating diet  Genitourinary: no hematuria or dysuria  Integument/Breast: no rash or pruritis  Hematologic/Lymphatic: no easy bruising or lymphadenopathy  Musculoskeletal: no arthralgias or myalgias  Neurological: no seizures or tremors  Behavioral/Psych: no auditory or visual hallucinations  Endocrine: no heat or cold intolerance     Niya Durbin was given instructions on administration of Prolia today.  She will be contacted by the speciality pharmacy when her medication is available, and will be scheduled to receive further detailed  instruction on  administration or when and where to receive her treatment.     She will continue with her calcium and vitamin D.  Fall prevention and personal safety have been reviewed.    We have discussed the need for core strengthening and balance exercises for fall prevention, we may consider formal physical therapy at her next visit.    Assessment and plan:    Osteoporosis    Prolia 60mg IM Q 6 months    Follow up 1-2 weeks after initiation of treatment to check Calcium, Vitamin D levels and access tolerance to medication.

## 2018-09-27 ENCOUNTER — INFUSION (OUTPATIENT)
Dept: INFECTIOUS DISEASES | Facility: HOSPITAL | Age: 63
End: 2018-09-27
Attending: INTERNAL MEDICINE
Payer: MEDICARE

## 2018-09-27 VITALS — BODY MASS INDEX: 22 KG/M2 | HEIGHT: 64 IN | WEIGHT: 128.88 LBS

## 2018-09-27 DIAGNOSIS — M81.0 OSTEOPOROSIS, UNSPECIFIED OSTEOPOROSIS TYPE, UNSPECIFIED PATHOLOGICAL FRACTURE PRESENCE: Primary | ICD-10-CM

## 2018-09-27 PROCEDURE — 96372 THER/PROPH/DIAG INJ SC/IM: CPT

## 2018-09-27 PROCEDURE — 63600175 PHARM REV CODE 636 W HCPCS: Mod: JG | Performed by: PHYSICIAN ASSISTANT

## 2018-09-27 RX ADMIN — DENOSUMAB 60 MG: 60 INJECTION SUBCUTANEOUS at 09:09

## 2018-09-27 NOTE — PLAN OF CARE
Problem: Health Knowledge, Opportunity to Enhance (Adult,NICU,[place],Obstetrics,Pediatric)  Goal: Knowledgeable about Health Subject/Topic  Patient will demonstrate the desired outcomes by discharge/transition of care.  Outcome: Ongoing (interventions implemented as appropriate)  PATIENT EDUCATED ON HAND WASHING AND INFECTION CONTROL. PATIENT VERBALIZED UNDERSTANDING

## 2019-03-28 ENCOUNTER — INFUSION (OUTPATIENT)
Dept: INFECTIOUS DISEASES | Facility: HOSPITAL | Age: 64
End: 2019-03-28
Attending: INTERNAL MEDICINE
Payer: MEDICARE

## 2019-03-28 VITALS — HEIGHT: 64 IN | WEIGHT: 128.75 LBS | BODY MASS INDEX: 21.98 KG/M2

## 2019-03-28 DIAGNOSIS — M81.0 OSTEOPOROSIS, UNSPECIFIED OSTEOPOROSIS TYPE, UNSPECIFIED PATHOLOGICAL FRACTURE PRESENCE: Primary | ICD-10-CM

## 2019-03-28 PROCEDURE — 96372 THER/PROPH/DIAG INJ SC/IM: CPT

## 2019-03-28 PROCEDURE — 63600175 PHARM REV CODE 636 W HCPCS: Mod: JG | Performed by: PHYSICIAN ASSISTANT

## 2019-03-28 RX ADMIN — DENOSUMAB 60 MG: 60 INJECTION SUBCUTANEOUS at 09:03

## 2019-05-18 ENCOUNTER — OFFICE VISIT (OUTPATIENT)
Dept: URGENT CARE | Facility: CLINIC | Age: 64
End: 2019-05-18
Payer: MEDICARE

## 2019-05-18 VITALS
HEIGHT: 64 IN | DIASTOLIC BLOOD PRESSURE: 86 MMHG | TEMPERATURE: 98 F | SYSTOLIC BLOOD PRESSURE: 141 MMHG | RESPIRATION RATE: 18 BRPM | HEART RATE: 62 BPM | BODY MASS INDEX: 21.85 KG/M2 | WEIGHT: 128 LBS | OXYGEN SATURATION: 99 %
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
## 2019-05-18 PROCEDURE — 99203 OFFICE O/P NEW LOW 30 MIN: CPT | Mod: 25,S$GLB,, | Performed by: NURSE PRACTITIONER

## 2019-05-18 PROCEDURE — 96372 THER/PROPH/DIAG INJ SC/IM: CPT | Mod: S$GLB,,, | Performed by: NURSE PRACTITIONER

## 2019-05-18 PROCEDURE — 96372 PR INJECTION,THERAP/PROPH/DIAG2ST, IM OR SUBCUT: ICD-10-PCS | Mod: S$GLB,,, | Performed by: NURSE PRACTITIONER

## 2019-05-18 PROCEDURE — 99203 PR OFFICE/OUTPT VISIT, NEW, LEVL III, 30-44 MIN: ICD-10-PCS | Mod: 25,S$GLB,, | Performed by: NURSE PRACTITIONER

## 2019-05-18 RX ORDER — BETAMETHASONE SODIUM PHOSPHATE AND BETAMETHASONE ACETATE 3; 3 MG/ML; MG/ML
6 INJECTION, SUSPENSION INTRA-ARTICULAR; INTRALESIONAL; INTRAMUSCULAR; SOFT TISSUE
Status: COMPLETED | OUTPATIENT
Start: 2019-05-18 | End: 2019-05-18

## 2019-05-18 RX ORDER — PREDNISONE 20 MG/1
20 TABLET ORAL DAILY
Qty: 5 TABLET | Refills: 0 | Status: SHIPPED | OUTPATIENT
Start: 2019-05-18 | End: 2019-05-23

## 2019-05-18 RX ADMIN — BETAMETHASONE SODIUM PHOSPHATE AND BETAMETHASONE ACETATE 6 MG: 3; 3 INJECTION, SUSPENSION INTRA-ARTICULAR; INTRALESIONAL; INTRAMUSCULAR; SOFT TISSUE at 11:05

## 2019-05-18 NOTE — PATIENT INSTRUCTIONS
Insect Bite  Insects most often bite to protect themselves or their nests. Certain bugs, like fleas and mosquitoes, bite to feed. In some cases, the actual bite causes no pain. An itchy red welt or swelling may develop at the site of the bite. Most insect bites do not cause illness. And the itching and swelling most often go away without treatment. However, an infection can develop if the bite is scratched and the skin broken. Rarely, a person may have an allergic reaction to an insect bite.  If a stinger is visible at the bite spot, remove it as quickly as possible, as this can decrease the amount of venom that gets into your body. Scrape it out with a dull edge, such as the edge of a credit card. Try not to squeeze it. Do not try to dig it out, as you may damage the skin and also increase the chance of infection.     To help reduce swelling and itching, apply a cold pack or ice in a zip-top plastic bag wrapped in a thin towel.   Home care  · Your healthcare provider may prescribe over-the-counter medicines to help relieve itching and swelling. Use each medicine according to the directions on the package. If the bite becomes infected, you will need an antibiotic. This may be in pill form taken by mouth or as an ointment or cream put directly on the skin. Be sure to use them exactly as prescribed.  · Bite symptoms usually go away on their own within a week or two.  · To help prevent infection, avoid scratching or picking at the bite.  · To help relieve itching and swelling, apply ice in a zip-top plastic bag wrapped in a thin towel to the bites. Do this for up to 10 minutes at a time. Avoid hot showers or baths as these tend to make itching worse.  · An over-the-counter anti-itch medicine such as calamine lotion or an antihistamine cream may be helpful.  · If you suspect you have insects in your home, talk to a licensed pest-control professional. He or she can inspect your home and tell you how to get rid of bugs  safely.  Follow-up care  Follow up with your healthcare provider, or as advised.  Call 911  Call 911 if any of these occur:  · Trouble breathing or swallowing  · Wheezing  · Feeling like your throat is closing up  · Fainting, loss of consciousness  · Swelling around the face or mouth  When to seek medical advice  Call your healthcare provider right away if any of these occur:  · Fever of 100.4°F (38°C) or higher, or as directed by your healthcare provider  · Signs of infection, such as increased swelling and pain, warmth, red streaks, or drainage from the skin  · Signs of allergic reaction, such as hives, a spreading rash, or throat itching  Date Last Reviewed: 10/1/2016  © 9955-8706 [employer]. [address] [place] [address], [place] [address]. All rights reserved. This information is not intended as a substitute for professional medical care. Always follow your healthcare professional's instructions.

## 2019-05-18 NOTE — PROGRESS NOTES
"Subjective:       Patient ID: Niya Durbin is a 64 y.o. female.    Vitals:  height is 5' 4" (1.626 m) and weight is 58.1 kg (128 lb). Her temperature is 98.4 °F (36.9 °C). Her blood pressure is 141/86 (abnormal) and her pulse is 62. Her respiration is 18 and oxygen saturation is 99%.     Chief Complaint: Insect Bite    Ant bites on right foot since May 8, 2019    Rash   This is a new problem. The current episode started 1 to 4 weeks ago. The problem is unchanged. The rash is characterized by pain, redness and itchiness. She was exposed to an insect bite/sting. Past treatments include anti-itch cream. The treatment provided mild relief.   Pain complaints is foot continues to itch, mostly at night    HENT: Negative for facial swelling.    Neck: Negative for painful lymph nodes.   Eyes: Negative for eye itching and eyelid swelling.   Skin: Positive for rash. Negative for color change, pale, wound, abrasion, laceration, lesion, skin thickening/induration, puncture wound, erythema, bruising, abscess, avulsion and hives.   Allergic/Immunologic: Negative for environmental allergies, immunocompromised state and hives.   Hematologic/Lymphatic: Negative for swollen lymph nodes.       Objective:      Physical Exam   Constitutional: She is oriented to person, place, and time. She appears well-developed and well-nourished. No distress.   HENT:   Head: Normocephalic.   Eyes: Pupils are equal, round, and reactive to light.   Cardiovascular: Normal rate, regular rhythm and normal heart sounds.   Pulmonary/Chest: Effort normal and breath sounds normal. No respiratory distress. She has no wheezes. She has no rales.   Abdominal: Soft. She exhibits no mass. There is no rebound and no guarding.   Neurological: She is alert and oriented to person, place, and time.   Skin: Skin is warm and dry. No erythema.        Psychiatric: She has a normal mood and affect. Her behavior is normal. Judgment and thought content normal.   Nursing " note and vitals reviewed.      Assessment:         ant bites  Plan:         Niya was seen today for insect bite.    Diagnoses and all orders for this visit:    Insect bite, initial encounter    Other orders  -     betamethasone acetate-betamethasone sodium phosphate injection 6 mg  -     predniSONE (DELTASONE) 20 MG tablet; Take 1 tablet (20 mg total) by mouth once daily. for 5 days    You were given a steroid injection today. Risks and benefits were discussed with you.   If you have diabetes this injection will raise your blood sugar. This will normalize over the next 2-3 days. Please make sure you monitor you blood sugar accordingly.   This medication can also increase you blood pressure. Please monitor your blood pressure as discussed.   Please keep in mind that you should not take long term steroids unless prescribed by your physician. You should not exceed 4 steroid injections in a year and if you have multiple injections they should be spaced out adequately. Long term side effects and risks can occur as mentioned at todays visit.   Also take benadryl and claritin  Education  Pt has been given instructions populated from [employer] database and those entered into patient instructions field and has verbalized understanding of the after visit summary and information contained wherein.    Follow Up  If no better 2-3 days fu with pcp.    In Case of Emergency   May go to ER for acute shortness of breath, lightheadedness, fever, or any other emergent complaints or changes in condition.

## 2019-09-04 ENCOUNTER — OFFICE VISIT (OUTPATIENT)
Dept: GASTROENTEROLOGY | Facility: CLINIC | Age: 64
End: 2019-09-04
Payer: MEDICARE

## 2019-09-04 VITALS
DIASTOLIC BLOOD PRESSURE: 79 MMHG | BODY MASS INDEX: 20.81 KG/M2 | HEART RATE: 56 BPM | SYSTOLIC BLOOD PRESSURE: 151 MMHG | WEIGHT: 121.25 LBS

## 2019-09-04 DIAGNOSIS — Z80.0 FAMILY HISTORY OF COLON CANCER: ICD-10-CM

## 2019-09-04 DIAGNOSIS — Z12.11 SCREENING FOR MALIGNANT NEOPLASM OF COLON: Primary | ICD-10-CM

## 2019-09-04 PROCEDURE — 99203 OFFICE O/P NEW LOW 30 MIN: CPT | Mod: S$PBB,,, | Performed by: INTERNAL MEDICINE

## 2019-09-04 PROCEDURE — 99999 PR PBB SHADOW E&M-EST. PATIENT-LVL III: CPT | Mod: PBBFAC,,, | Performed by: INTERNAL MEDICINE

## 2019-09-04 PROCEDURE — 99999 PR PBB SHADOW E&M-EST. PATIENT-LVL III: ICD-10-PCS | Mod: PBBFAC,,, | Performed by: INTERNAL MEDICINE

## 2019-09-04 PROCEDURE — 99203 PR OFFICE/OUTPT VISIT, NEW, LEVL III, 30-44 MIN: ICD-10-PCS | Mod: S$PBB,,, | Performed by: INTERNAL MEDICINE

## 2019-09-04 PROCEDURE — 99213 OFFICE O/P EST LOW 20 MIN: CPT | Mod: PBBFAC,PO | Performed by: INTERNAL MEDICINE

## 2019-09-04 RX ORDER — SODIUM, POTASSIUM,MAG SULFATES 17.5-3.13G
1 SOLUTION, RECONSTITUTED, ORAL ORAL DAILY
Qty: 1 KIT | Refills: 0 | Status: SHIPPED | OUTPATIENT
Start: 2019-09-04 | End: 2019-09-06

## 2019-09-04 NOTE — PATIENT INSTRUCTIONS
SUPREP Instructions    You are scheduled for a colonoscopy with Dr. Hoyt on 9/26/19  At [facility]  To ensure that your test is accurate and complete, you MUST follow these instructions listed below.  If you have any questions, please call our office at [phone].  Plan on being at the hospital for your procedure for 3-4 hours.    1.  Follow a CLEAR LIQUID DIET for the entire day before your scheduled colonoscopy.  This means no solid food the entire day starting when you wake.  You may have as much of the clear liquids as you want throughout the day.   CLEAR LIQUID DIET:   - Avoid Red, Orange, Purple, and/or Blue food coloring   - NO DAIRY   - You can have:  Coffee with sugar (no creamer), tea, water, soda, apple or white grape juice, chicken or beef broth/bouillon (no meat, noodles, or veggies), green/yellow popsicles, green/yellow Jell-O, lemonade.    2.  AT 5 pm the evening before your colonoscopy, POUR ONE (1) BOTTLE OF SUPREP INTO THE MIXING CONTAINER, PROVIDED INSIDE THE BOX.  ADD WATER TO THE LINE ON THE CONTAINER AND MIX IT WELL.  DRINK THE ENTIRE CONTAINER AND THEN DRINK TWO (2) MORE CONTAINERS OF WATER OVER THE NEXT 1 HOUR.  This is sometimes easier to drink if this solution is cold, so you can mix the solution a few hours ahead of time and place in the refrigerator prior to drinking.  You have to drink the solution within 24 hours of mixing it.  Do NOT put this solution over ice.  It IS ok to drink with a straw.    3.  The endoscopy department will call you 2 days before your colonoscopy to tell you the exact time to arrive, AND to tell you the exact time to drink the 2nd portion of your prep (which will be FIVE HOURS BEFORE YOUR ARRIVAL TIME).  At this time given to you, POUR ONE (1) BOTTLE OF SUPREP INTO THE MIXING CONTAINER, PROVIDED INSIDE THE BOX.  ADD WATER TO THE LINE ON THE CONTAINER AND MIX IT WELL.  DRINK THE ENTIRE CONTAINER AND THEN DRINK TWO (2) MORE CONTAINERS OF WATER OVER THE  NEXT 1 HOUR.  This is sometimes easier to drink if this solution is cold, so you can mix the solution a few hours ahead of time and place in the refrigerator prior to drinking.  You have to drink the solution within 24 hours of mixing it.  Do NOT put this solution over ice.  It IS ok to drink with a straw. Once this is complete, you may not have ANYTHING else by mouth!    4.  You must have someone with you to DRIVE YOU HOME since you will be receiving IV sedation for the colonoscopy.    5.  It is ok to take your heart, blood pressure, and seizure medications in the morning of your test with a SIP of water.  Hold other medications until after your procedure.  Do NOT have anything else to eat or drink the morning of your colonoscopy.  It is ok to brush your teeth.    6.  If you are on blood thinners THAT YOU HAVE BEEN INSTRUCTED TO HOLD BY YOUR DOCTOR FOR THIS PROCEDURE, then do NOT take this the morning of your colonoscopy.  Do NOT stop these medications on your own, they must be approved to be held by your doctor.  Your colonoscopy can NOT be done if you are on these medications.  Examples of blood thinners include: Coumadin, Aggrenox, Plavix, Pradaxa, Reapro, Pletal, Xarelto, Ticagrelor, Brilinta, Eliquis, and high dose aspirin (325 mg).  You do not have to stop baby aspirin 81 mg.    7.  IF YOU ARE DIABETIC:  NO INSULIN OR ORAL MEDICATIONS THE MORNING OF THE COLONOSCOPY.  TAKE ONLY HALF THE DOSE OF YOUR INSULIN THE DAY BEFORE THE COLONOSCOPY.  DO NOT TAKE ANY ORAL DIABETIC MEDICATIONS THE DAY BEFORE THE COLONOSCOPY.  IF YOU ARE AN INSULIN DEPENDENT DIABETIC WITH UNSTABLE BLOOD SUGARS, NOTIFY YOUR PRIMARY CARE PHYSICIAN FOR INSTRUCTIONS.

## 2019-09-04 NOTE — PROGRESS NOTES
Subjective:       Patient ID: Niya Durbin is a 64 y.o. female.    Chief Complaint: Colonoscopy    This is a 64-year-old female here for evaluation for colonoscopy with a family history of colon cancer.  She has a family history of colon cancer in multiple second-degree relatives including 1 grandfather and 1 grandmother, though this occurred at advanced age.  She denies any current GI symptoms.  No nausea, vomiting.  She has never had a colonoscopy.  No other exacerbating or relieving factors or other associated symptoms.    The following portions of the patient's history were reviewed and updated as appropriate: allergies, current medications, past family history, past medical history, past social history, past surgical history and problem list.    (Portions of this note were dictated using voice recognition software and may contain dictation related errors in spelling/grammar/syntax not found on text review)  HPI  Review of Systems   Constitutional: Negative for activity change and appetite change.   Cardiovascular: Negative for chest pain and palpitations.   Gastrointestinal: Negative for abdominal distention and abdominal pain.   Musculoskeletal: Negative for arthralgias and back pain.         Objective:      Physical Exam   Constitutional: She is oriented to person, place, and time. She appears well-developed and well-nourished. No distress.   HENT:   Head: Normocephalic and atraumatic.   Eyes: Conjunctivae are normal. No scleral icterus.   Neck: No tracheal deviation present. No thyromegaly present.   Cardiovascular: Normal rate and normal heart sounds. Exam reveals no friction rub.   Pulmonary/Chest: Effort normal. No respiratory distress.   Abdominal: Soft. Bowel sounds are normal. She exhibits no distension. There is no tenderness.   Musculoskeletal: Normal range of motion. She exhibits no edema.   Neurological: She is alert and oriented to person, place, and time.   Psychiatric: She has a normal mood  and affect. Her behavior is normal.   Nursing note and vitals reviewed.        Labs/imaging; reviewed  Assessment:       1. Screening for malignant neoplasm of colon    2. Family history of colon cancer        Plan:   1. Average risk screening colonoscopy

## 2019-09-19 ENCOUNTER — TELEPHONE (OUTPATIENT)
Dept: GASTROENTEROLOGY | Facility: CLINIC | Age: 64
End: 2019-09-19

## 2019-09-19 NOTE — TELEPHONE ENCOUNTER
----- Message from Spencer Arriaga sent at 9/19/2019 11:21 AM CDT -----  Contact: self [phone]  Patient is calling to make sure her prep kit for her colonoscopy is called in. Please call and advise.

## 2019-09-24 ENCOUNTER — TELEPHONE (OUTPATIENT)
Dept: ENDOSCOPY | Facility: HOSPITAL | Age: 64
End: 2019-09-24

## 2019-09-24 NOTE — TELEPHONE ENCOUNTER
Spoke with patient about arrival time @ 0830.     Prep instructions reviewed: the day before the procedure, follow a clear liquid diet all day, then start the first 1/2 of prep at 5pm and take 2nd 1/2 of prep @ 0200.  Pt must be completely NPO when prep completed @ 0330.              Medications: Do not take Insulin or oral diabetic medications the day of the procedure.  Take as prescribed: heart, seizure and blood pressure medication in the morning with a sip of water (less than an ounce).  Take any breathing medications and bring inhalers to hospital with you Leave all valuables and jewelry at home.     Wear comfortable clothes to procedure to change into hospital gown You cannot drive for 24 hours after your procedure because you will receive sedation for your procedure to make you comfortable.  A ride must be provided at discharge.

## 2019-09-26 ENCOUNTER — HOSPITAL ENCOUNTER (OUTPATIENT)
Facility: HOSPITAL | Age: 64
Discharge: HOME | End: 2019-09-26
Attending: INTERNAL MEDICINE | Admitting: INTERNAL MEDICINE
Payer: MEDICARE

## 2019-09-26 ENCOUNTER — ANESTHESIA EVENT (OUTPATIENT)
Dept: ENDOSCOPY | Facility: HOSPITAL | Age: 64
End: 2019-09-26
Payer: MEDICARE

## 2019-09-26 ENCOUNTER — ANESTHESIA (OUTPATIENT)
Dept: ENDOSCOPY | Facility: HOSPITAL | Age: 64
End: 2019-09-26
Payer: MEDICARE

## 2019-09-26 VITALS
SYSTOLIC BLOOD PRESSURE: 130 MMHG | TEMPERATURE: 98 F | DIASTOLIC BLOOD PRESSURE: 82 MMHG | RESPIRATION RATE: 20 BRPM | BODY MASS INDEX: 20.49 KG/M2 | OXYGEN SATURATION: 100 % | HEART RATE: 80 BPM | HEIGHT: 64 IN | WEIGHT: 120 LBS

## 2019-09-26 DIAGNOSIS — Z12.11 SCREENING FOR MALIGNANT NEOPLASM OF COLON: ICD-10-CM

## 2019-09-26 PROBLEM — Z86.010 HISTORY OF COLON POLYPS: Status: ACTIVE | Noted: 2019-09-26

## 2019-09-26 PROBLEM — Z86.0100 HISTORY OF COLON POLYPS: Status: ACTIVE | Noted: 2019-09-26

## 2019-09-26 PROCEDURE — 37000009 HC ANESTHESIA EA ADD 15 MINS: Performed by: INTERNAL MEDICINE

## 2019-09-26 PROCEDURE — 37000008 HC ANESTHESIA 1ST 15 MINUTES: Performed by: INTERNAL MEDICINE

## 2019-09-26 PROCEDURE — 63600175 PHARM REV CODE 636 W HCPCS: Performed by: INTERNAL MEDICINE

## 2019-09-26 PROCEDURE — 27201012 HC FORCEPS, HOT/COLD, DISP: Performed by: INTERNAL MEDICINE

## 2019-09-26 PROCEDURE — 88305 TISSUE SPECIMEN TO PATHOLOGY - SURGERY: ICD-10-PCS | Mod: 26,,, | Performed by: PATHOLOGY

## 2019-09-26 PROCEDURE — 45380 PR COLONOSCOPY,BIOPSY: ICD-10-PCS | Mod: PT,,, | Performed by: INTERNAL MEDICINE

## 2019-09-26 PROCEDURE — 88305 TISSUE EXAM BY PATHOLOGIST: CPT | Performed by: PATHOLOGY

## 2019-09-26 PROCEDURE — 45380 COLONOSCOPY AND BIOPSY: CPT | Performed by: INTERNAL MEDICINE

## 2019-09-26 PROCEDURE — 63600175 PHARM REV CODE 636 W HCPCS: Performed by: NURSE ANESTHETIST, CERTIFIED REGISTERED

## 2019-09-26 PROCEDURE — 45380 COLONOSCOPY AND BIOPSY: CPT | Mod: PT,,, | Performed by: INTERNAL MEDICINE

## 2019-09-26 PROCEDURE — 88305 TISSUE EXAM BY PATHOLOGIST: CPT | Mod: 26,,, | Performed by: PATHOLOGY

## 2019-09-26 RX ORDER — PROPOFOL 10 MG/ML
VIAL (ML) INTRAVENOUS
Status: DISCONTINUED | OUTPATIENT
Start: 2019-09-26 | End: 2019-09-26

## 2019-09-26 RX ORDER — SODIUM CHLORIDE 0.9 % (FLUSH) 0.9 %
10 SYRINGE (ML) INJECTION
Status: ACTIVE | OUTPATIENT
Start: 2019-09-26

## 2019-09-26 RX ORDER — LIDOCAINE HCL/PF 100 MG/5ML
SYRINGE (ML) INTRAVENOUS
Status: DISCONTINUED | OUTPATIENT
Start: 2019-09-26 | End: 2019-09-26

## 2019-09-26 RX ORDER — PROPOFOL 10 MG/ML
VIAL (ML) INTRAVENOUS CONTINUOUS PRN
Status: DISCONTINUED | OUTPATIENT
Start: 2019-09-26 | End: 2019-09-26

## 2019-09-26 RX ORDER — SODIUM CHLORIDE 9 MG/ML
INJECTION, SOLUTION INTRAVENOUS CONTINUOUS
Status: ACTIVE | OUTPATIENT
Start: 2019-09-26

## 2019-09-26 RX ADMIN — PROPOFOL 70 MG: 10 INJECTION, EMULSION INTRAVENOUS at 09:09

## 2019-09-26 RX ADMIN — SODIUM CHLORIDE 20 ML/HR: 0.9 INJECTION, SOLUTION INTRAVENOUS at 08:09

## 2019-09-26 RX ADMIN — LIDOCAINE HYDROCHLORIDE 50 MG: 20 INJECTION, SOLUTION INTRAVENOUS at 09:09

## 2019-09-26 RX ADMIN — PROPOFOL 150 MCG/KG/MIN: 10 INJECTION, EMULSION INTRAVENOUS at 09:09

## 2019-09-26 NOTE — TRANSFER OF CARE
"Anesthesia Transfer of Care Note    Patient: Niya Durbin    Procedure(s) Performed: Procedure(s) (LRB):  COLONOSCOPY/suprep (N/A)    Patient location: GI    Anesthesia Type: MAC    Transport from OR: Transported from OR on room air with adequate spontaneous ventilation    Post pain: adequate analgesia    Post assessment: no apparent anesthetic complications and tolerated procedure well    Post vital signs: stable    Level of consciousness: awake, alert and oriented    Nausea/Vomiting: no nausea/vomiting    Complications: none    Transfer of care protocol was followed      Last vitals:   Visit Vitals  BP (!) 141/75 (BP Location: Left arm, Patient Position: Lying)   Pulse (!) 59   Temp 36.7 °C (98.1 °F) (Temporal)   Resp 20   Ht 5' 4" (1.626 m)   Wt 54.4 kg (120 lb)   SpO2 97%   Breastfeeding? No   BMI 20.60 kg/m²     "

## 2019-09-26 NOTE — ANESTHESIA POSTPROCEDURE EVALUATION
Anesthesia Post Evaluation    Patient: Niya Durbin    Procedure(s) Performed: Procedure(s) (LRB):  COLONOSCOPY/suprep (N/A)    Final Anesthesia Type: MAC  Patient location during evaluation: GI PACU  Patient participation: Yes- Able to Participate  Level of consciousness: awake and alert and oriented  Post-procedure vital signs: reviewed and stable  Pain management: adequate  Airway patency: patent  PONV status at discharge: No PONV  Anesthetic complications: no      Cardiovascular status: blood pressure returned to baseline, hemodynamically stable and stable  Respiratory status: unassisted, spontaneous ventilation and room air  Hydration status: euvolemic  Follow-up not needed.          Vitals Value Taken Time   /75 9/26/2019  8:51 AM   Temp 36.7 °C (98.1 °F) 9/26/2019  8:51 AM   Pulse 59 9/26/2019  8:51 AM   Resp 20 9/26/2019  8:51 AM   SpO2 97 % 9/26/2019  8:51 AM         No case tracking events are documented in the log.      Pain/Felipe Score: No data recorded

## 2019-09-26 NOTE — ANESTHESIA PREPROCEDURE EVALUATION
09/26/2019  Niya Durbin is a 64 y.o., female for colonoscopy under MAC        Anesthesia Evaluation    I have reviewed the Patient Summary Reports.    I have reviewed the Nursing Notes.   I have reviewed the Medications.     Review of Systems  Social:  Non-Smoker        Physical Exam  General:  Well nourished    Airway/Jaw/Neck:  Airway Findings: Mallampati: II      Chest/Lungs:  Chest/Lungs Clear    Heart/Vascular:  Heart Findings: Normal            Anesthesia Plan  Type of Anesthesia, risks & benefits discussed:  Anesthesia Type:  MAC  Patient's Preference:   Intra-op Monitoring Plan:   Intra-op Monitoring Plan Comments:   Post Op Pain Control Plan:   Post Op Pain Control Plan Comments:   Induction:    Beta Blocker:  Patient is not currently on a Beta-Blocker (No further documentation required).       Informed Consent: Patient understands risks and agrees with Anesthesia plan.  Questions answered. Anesthesia consent signed with patient.  ASA Score: 1     Day of Surgery Review of History & Physical:            Ready For Surgery From Anesthesia Perspective.

## 2019-09-26 NOTE — [employer] PATIENT INSTRUCTIONS
Discharge Summary/Instructions after an Endoscopic Procedure  Patient Name: Niya Durbin  Patient MRN: [id number]  Patient YOB: 1955  Thursday, September 26, 2019  Santo Hoyt MD  RESTRICTIONS:  During your procedure today, you received medications for sedation.  These   medications may affect your judgment, balance and coordination.  Therefore,   for 24 hours, you have the following restrictions:   - DO NOT drive a car, operate machinery, make legal/financial decisions,   sign important papers or drink alcohol.    ACTIVITY:  Today: no heavy lifting, straining or running due to procedural   sedation/anesthesia.  The following day: return to full activity including work.  DIET:  Eat and drink normally unless instructed otherwise.     TREATMENT FOR COMMON SIDE EFFECTS:  - Mild abdominal pain, nausea, belching, bloating or excessive gas:  rest,   eat lightly and use a heating pad.  - Sore Throat: treat with throat lozenges and/or gargle with warm salt   water.  - Because air was used during the procedure, expelling large amounts of air   from your rectum or belching is normal.  - If a bowel prep was taken, you may not have a bowel movement for 1-3 days.    This is normal.  SYMPTOMS TO WATCH FOR AND REPORT TO YOUR PHYSICIAN:  1. Abdominal pain or bloating, other than gas cramps.  2. Chest pain.  3. Back pain.  4. Signs of infection such as: chills or fever occurring within 24 hours   after the procedure.  5. Rectal bleeding, which would show as bright red, maroon, or black stools.   (A tablespoon of blood from the rectum is not serious, especially if   hemorrhoids are present.)  6. Vomiting.  7. Weakness or dizziness.  GO DIRECTLY TO THE NEAREST EMERGENCY ROOM IF YOU HAVE ANY OF THE FOLLOWING:      Difficulty breathing              Chills and/or fever over 101 F   Persistent vomiting and/or vomiting blood   Severe abdominal pain   Severe chest pain   Black, tarry stools   Bleeding- more than one  tablespoon   Any other symptom or condition that you feel may need urgent attention  Your doctor recommends these additional instructions:  If any biopsies were taken, your doctors clinic will contact you in 1 to 2   weeks with any results.  - Discharge patient to home (via wheelchair).   - Patient has a contact number available for emergencies.  The signs and   symptoms of potential delayed complications were discussed with the   patient.  Return to normal activities tomorrow.  Written discharge   instructions were provided to the patient.   - Resume previous diet.   - Continue present medications.   - Await pathology results.   - Repeat colonoscopy date to be determined after pending pathology results   are reviewed for surveillance.  For questions, problems or results please call your physician - Santo Hoyt MD at Work:  [phone].  EMERGENCY PHONE NUMBER: [phone],  LAB RESULTS: [phone]  IF A COMPLICATION OR EMERGENCY SITUATION ARISES AND YOU ARE UNABLE TO REACH   YOUR PHYSICIAN - GO DIRECTLY TO THE EMERGENCY ROOM.  Santo Hoyt MD  9/26/2019 10:33:17 AM  This report has been verified and signed electronically.  [employer]

## 2019-09-30 ENCOUNTER — INFUSION (OUTPATIENT)
Dept: INFECTIOUS DISEASES | Facility: HOSPITAL | Age: 64
End: 2019-09-30
Attending: INTERNAL MEDICINE
Payer: MEDICARE

## 2019-09-30 VITALS — WEIGHT: 119.94 LBS | HEIGHT: 64 IN | BODY MASS INDEX: 20.47 KG/M2

## 2019-09-30 DIAGNOSIS — M81.0 OSTEOPOROSIS, UNSPECIFIED OSTEOPOROSIS TYPE, UNSPECIFIED PATHOLOGICAL FRACTURE PRESENCE: Primary | ICD-10-CM

## 2019-09-30 PROCEDURE — 63600175 PHARM REV CODE 636 W HCPCS: Mod: JG | Performed by: PHYSICIAN ASSISTANT

## 2019-09-30 PROCEDURE — 96372 THER/PROPH/DIAG INJ SC/IM: CPT

## 2019-09-30 RX ADMIN — DENOSUMAB 60 MG: 60 INJECTION SUBCUTANEOUS at 09:09

## 2019-10-07 ENCOUNTER — TELEPHONE (OUTPATIENT)
Dept: GASTROENTEROLOGY | Facility: CLINIC | Age: 64
End: 2019-10-07

## 2020-03-31 ENCOUNTER — INFUSION (OUTPATIENT)
Dept: INFECTIOUS DISEASES | Facility: HOSPITAL | Age: 65
End: 2020-03-31
Attending: PHYSICIAN ASSISTANT
Payer: MEDICARE

## 2020-03-31 VITALS
HEIGHT: 64 IN | TEMPERATURE: 98 F | DIASTOLIC BLOOD PRESSURE: 78 MMHG | SYSTOLIC BLOOD PRESSURE: 123 MMHG | WEIGHT: 119.94 LBS | BODY MASS INDEX: 20.47 KG/M2

## 2020-03-31 DIAGNOSIS — M81.0 OSTEOPOROSIS, UNSPECIFIED OSTEOPOROSIS TYPE, UNSPECIFIED PATHOLOGICAL FRACTURE PRESENCE: Primary | ICD-10-CM

## 2020-03-31 DIAGNOSIS — M80.80XA PATHOLOGICAL FRACTURE DUE TO OSTEOPOROSIS, UNSPECIFIED FRACTURE SITE, UNSPECIFIED OSTEOPOROSIS TYPE, INITIAL ENCOUNTER: ICD-10-CM

## 2020-03-31 DIAGNOSIS — M81.6 LOCALIZED OSTEOPOROSIS OF LEQUESNE: ICD-10-CM

## 2020-03-31 PROCEDURE — 96372 THER/PROPH/DIAG INJ SC/IM: CPT | Mod: HCNC

## 2020-03-31 PROCEDURE — 63600175 PHARM REV CODE 636 W HCPCS: Mod: JG,HCNC | Performed by: PHYSICIAN ASSISTANT

## 2020-03-31 RX ADMIN — DENOSUMAB 60 MG: 60 INJECTION SUBCUTANEOUS at 10:03

## 2020-10-01 ENCOUNTER — INFUSION (OUTPATIENT)
Dept: INFECTIOUS DISEASES | Facility: HOSPITAL | Age: 65
End: 2020-10-01
Attending: FAMILY MEDICINE
Payer: MEDICARE

## 2020-10-01 VITALS — HEART RATE: 48 BPM | SYSTOLIC BLOOD PRESSURE: 142 MMHG | DIASTOLIC BLOOD PRESSURE: 74 MMHG

## 2020-10-01 DIAGNOSIS — M81.0 OSTEOPOROSIS, UNSPECIFIED OSTEOPOROSIS TYPE, UNSPECIFIED PATHOLOGICAL FRACTURE PRESENCE: Primary | ICD-10-CM

## 2020-10-01 PROCEDURE — 96372 THER/PROPH/DIAG INJ SC/IM: CPT | Mod: HCNC

## 2020-10-01 PROCEDURE — 63600175 PHARM REV CODE 636 W HCPCS: Mod: JG,HCNC | Performed by: PHYSICIAN ASSISTANT

## 2020-10-01 RX ADMIN — DENOSUMAB 60 MG: 60 INJECTION SUBCUTANEOUS at 11:10

## 2021-01-31 PROBLEM — Z87.81 HISTORY OF PELVIC FRACTURE: Status: ACTIVE | Noted: 2021-01-31

## 2021-02-03 ENCOUNTER — OFFICE VISIT (OUTPATIENT)
Dept: FAMILY MEDICINE | Facility: CLINIC | Age: 66
End: 2021-02-03
Attending: FAMILY MEDICINE
Payer: MEDICARE

## 2021-02-03 VITALS
DIASTOLIC BLOOD PRESSURE: 80 MMHG | HEIGHT: 64 IN | WEIGHT: 138.5 LBS | SYSTOLIC BLOOD PRESSURE: 144 MMHG | BODY MASS INDEX: 23.64 KG/M2 | HEART RATE: 52 BPM | OXYGEN SATURATION: 100 %

## 2021-02-03 DIAGNOSIS — M81.0 OSTEOPOROSIS, UNSPECIFIED OSTEOPOROSIS TYPE, UNSPECIFIED PATHOLOGICAL FRACTURE PRESENCE: ICD-10-CM

## 2021-02-03 DIAGNOSIS — Z87.81 HISTORY OF PELVIC FRACTURE: ICD-10-CM

## 2021-02-03 DIAGNOSIS — Z01.00 EYE EXAM, ROUTINE: ICD-10-CM

## 2021-02-03 DIAGNOSIS — Z00.00 LABORATORY EXAM ORDERED AS PART OF ROUTINE GENERAL MEDICAL EXAMINATION: ICD-10-CM

## 2021-02-03 DIAGNOSIS — Z13.6 ENCOUNTER FOR SCREENING FOR CARDIOVASCULAR DISORDERS: ICD-10-CM

## 2021-02-03 DIAGNOSIS — L98.9 SKIN LESIONS: ICD-10-CM

## 2021-02-03 DIAGNOSIS — Z00.00 ROUTINE GENERAL MEDICAL EXAMINATION AT A HEALTH CARE FACILITY: Primary | ICD-10-CM

## 2021-02-03 DIAGNOSIS — R35.0 FREQUENCY OF MICTURITION: ICD-10-CM

## 2021-02-03 DIAGNOSIS — Z86.010 HISTORY OF COLON POLYPS: ICD-10-CM

## 2021-02-03 DIAGNOSIS — E03.8 SUBCLINICAL HYPOTHYROIDISM: ICD-10-CM

## 2021-02-03 DIAGNOSIS — Z12.31 ENCOUNTER FOR SCREENING MAMMOGRAM FOR BREAST CANCER: ICD-10-CM

## 2021-02-03 PROCEDURE — G0009 PNEUMOCOCCAL CONJUGATE VACCINE 13-VALENT LESS THAN 5YO & GREATER THAN: ICD-10-PCS | Mod: HCNC,S$GLB,, | Performed by: FAMILY MEDICINE

## 2021-02-03 PROCEDURE — 90670 PNEUMOCOCCAL CONJUGATE VACCINE 13-VALENT LESS THAN 5YO & GREATER THAN: ICD-10-PCS | Mod: HCNC,S$GLB,, | Performed by: FAMILY MEDICINE

## 2021-02-03 PROCEDURE — 90670 PCV13 VACCINE IM: CPT | Mod: HCNC,S$GLB,, | Performed by: FAMILY MEDICINE

## 2021-02-03 PROCEDURE — 1101F PR PT FALLS ASSESS DOC 0-1 FALLS W/OUT INJ PAST YR: ICD-10-PCS | Mod: HCNC,CPTII,S$GLB, | Performed by: FAMILY MEDICINE

## 2021-02-03 PROCEDURE — 3008F PR BODY MASS INDEX (BMI) DOCUMENTED: ICD-10-PCS | Mod: HCNC,CPTII,S$GLB, | Performed by: FAMILY MEDICINE

## 2021-02-03 PROCEDURE — 3288F PR FALLS RISK ASSESSMENT DOCUMENTED: ICD-10-PCS | Mod: HCNC,CPTII,S$GLB, | Performed by: FAMILY MEDICINE

## 2021-02-03 PROCEDURE — 99387 INIT PM E/M NEW PAT 65+ YRS: CPT | Mod: HCNC,25,S$GLB, | Performed by: FAMILY MEDICINE

## 2021-02-03 PROCEDURE — G0009 ADMIN PNEUMOCOCCAL VACCINE: HCPCS | Mod: HCNC,S$GLB,, | Performed by: FAMILY MEDICINE

## 2021-02-03 PROCEDURE — 1101F PT FALLS ASSESS-DOCD LE1/YR: CPT | Mod: HCNC,CPTII,S$GLB, | Performed by: FAMILY MEDICINE

## 2021-02-03 PROCEDURE — 81003 URINALYSIS AUTO W/O SCOPE: CPT | Mod: HCNC

## 2021-02-03 PROCEDURE — 99999 PR PBB SHADOW E&M-EST. PATIENT-LVL IV: CPT | Mod: PBBFAC,HCNC,, | Performed by: FAMILY MEDICINE

## 2021-02-03 PROCEDURE — 99999 PR PBB SHADOW E&M-EST. PATIENT-LVL IV: ICD-10-PCS | Mod: PBBFAC,HCNC,, | Performed by: FAMILY MEDICINE

## 2021-02-03 PROCEDURE — 3008F BODY MASS INDEX DOCD: CPT | Mod: HCNC,CPTII,S$GLB, | Performed by: FAMILY MEDICINE

## 2021-02-03 PROCEDURE — 3288F FALL RISK ASSESSMENT DOCD: CPT | Mod: HCNC,CPTII,S$GLB, | Performed by: FAMILY MEDICINE

## 2021-02-03 PROCEDURE — 99387 PR PREVENTIVE VISIT,NEW,65 & OVER: ICD-10-PCS | Mod: HCNC,25,S$GLB, | Performed by: FAMILY MEDICINE

## 2021-02-03 RX ORDER — ZOSTER VACCINE RECOMBINANT, ADJUVANTED 50 MCG/0.5
0.5 KIT INTRAMUSCULAR ONCE
Qty: 1 EACH | Refills: 1 | Status: SHIPPED | OUTPATIENT
Start: 2021-02-03 | End: 2021-02-03

## 2021-02-04 ENCOUNTER — LAB VISIT (OUTPATIENT)
Dept: LAB | Facility: HOSPITAL | Age: 66
End: 2021-02-04
Attending: FAMILY MEDICINE
Payer: MEDICARE

## 2021-02-04 DIAGNOSIS — R35.0 FREQUENCY OF MICTURITION: ICD-10-CM

## 2021-02-04 DIAGNOSIS — E03.8 SUBCLINICAL HYPOTHYROIDISM: ICD-10-CM

## 2021-02-04 DIAGNOSIS — Z00.00 LABORATORY EXAM ORDERED AS PART OF ROUTINE GENERAL MEDICAL EXAMINATION: ICD-10-CM

## 2021-02-04 DIAGNOSIS — Z13.6 ENCOUNTER FOR SCREENING FOR CARDIOVASCULAR DISORDERS: ICD-10-CM

## 2021-02-04 LAB
ALBUMIN SERPL BCP-MCNC: 4.1 G/DL (ref 3.5–5.2)
ALP SERPL-CCNC: 41 U/L (ref 55–135)
ALT SERPL W/O P-5'-P-CCNC: 12 U/L (ref 10–44)
ANION GAP SERPL CALC-SCNC: 7 MMOL/L (ref 8–16)
AST SERPL-CCNC: 17 U/L (ref 10–40)
BASOPHILS # BLD AUTO: 0.03 K/UL (ref 0–0.2)
BASOPHILS NFR BLD: 0.7 % (ref 0–1.9)
BILIRUB SERPL-MCNC: 0.7 MG/DL (ref 0.1–1)
BILIRUB UR QL STRIP: NEGATIVE
BUN SERPL-MCNC: 16 MG/DL (ref 8–23)
CALCIUM SERPL-MCNC: 8.8 MG/DL (ref 8.7–10.5)
CHLORIDE SERPL-SCNC: 106 MMOL/L (ref 95–110)
CHOLEST SERPL-MCNC: 172 MG/DL (ref 120–199)
CHOLEST/HDLC SERPL: 2.6 {RATIO} (ref 2–5)
CLARITY UR REFRACT.AUTO: CLEAR
CO2 SERPL-SCNC: 28 MMOL/L (ref 23–29)
COLOR UR AUTO: YELLOW
CREAT SERPL-MCNC: 0.8 MG/DL (ref 0.5–1.4)
DIFFERENTIAL METHOD: NORMAL
EOSINOPHIL # BLD AUTO: 0.2 K/UL (ref 0–0.5)
EOSINOPHIL NFR BLD: 4.2 % (ref 0–8)
ERYTHROCYTE [DISTWIDTH] IN BLOOD BY AUTOMATED COUNT: 12.7 % (ref 11.5–14.5)
EST. GFR  (AFRICAN AMERICAN): >60 ML/MIN/1.73 M^2
EST. GFR  (NON AFRICAN AMERICAN): >60 ML/MIN/1.73 M^2
GLUCOSE SERPL-MCNC: 95 MG/DL (ref 70–110)
GLUCOSE UR QL STRIP: NEGATIVE
HCT VFR BLD AUTO: 40.6 % (ref 37–48.5)
HDLC SERPL-MCNC: 66 MG/DL (ref 40–75)
HDLC SERPL: 38.4 % (ref 20–50)
HGB BLD-MCNC: 13.5 G/DL (ref 12–16)
HGB UR QL STRIP: NEGATIVE
IMM GRANULOCYTES # BLD AUTO: 0.02 K/UL (ref 0–0.04)
IMM GRANULOCYTES NFR BLD AUTO: 0.4 % (ref 0–0.5)
KETONES UR QL STRIP: NEGATIVE
LDLC SERPL CALC-MCNC: 94.2 MG/DL (ref 63–159)
LEUKOCYTE ESTERASE UR QL STRIP: NEGATIVE
LYMPHOCYTES # BLD AUTO: 1.1 K/UL (ref 1–4.8)
LYMPHOCYTES NFR BLD: 23.9 % (ref 18–48)
MCH RBC QN AUTO: 30.2 PG (ref 27–31)
MCHC RBC AUTO-ENTMCNC: 33.3 G/DL (ref 32–36)
MCV RBC AUTO: 91 FL (ref 82–98)
MONOCYTES # BLD AUTO: 0.4 K/UL (ref 0.3–1)
MONOCYTES NFR BLD: 8.3 % (ref 4–15)
NEUTROPHILS # BLD AUTO: 2.9 K/UL (ref 1.8–7.7)
NEUTROPHILS NFR BLD: 62.5 % (ref 38–73)
NITRITE UR QL STRIP: NEGATIVE
NONHDLC SERPL-MCNC: 106 MG/DL
NRBC BLD-RTO: 0 /100 WBC
PH UR STRIP: 8 [PH] (ref 5–8)
PLATELET # BLD AUTO: 219 K/UL (ref 150–350)
PMV BLD AUTO: 10.7 FL (ref 9.2–12.9)
POTASSIUM SERPL-SCNC: 5 MMOL/L (ref 3.5–5.1)
PROT SERPL-MCNC: 6.5 G/DL (ref 6–8.4)
PROT UR QL STRIP: NEGATIVE
RBC # BLD AUTO: 4.47 M/UL (ref 4–5.4)
SODIUM SERPL-SCNC: 141 MMOL/L (ref 136–145)
SP GR UR STRIP: 1.01 (ref 1–1.03)
T4 FREE SERPL-MCNC: 1.04 NG/DL (ref 0.71–1.51)
TRIGL SERPL-MCNC: 59 MG/DL (ref 30–150)
TSH SERPL DL<=0.005 MIU/L-ACNC: 5.92 UIU/ML (ref 0.4–4)
URN SPEC COLLECT METH UR: NORMAL
WBC # BLD AUTO: 4.56 K/UL (ref 3.9–12.7)

## 2021-02-04 PROCEDURE — 84439 ASSAY OF FREE THYROXINE: CPT | Mod: HCNC

## 2021-02-04 PROCEDURE — 80061 LIPID PANEL: CPT | Mod: HCNC

## 2021-02-04 PROCEDURE — 80053 COMPREHEN METABOLIC PANEL: CPT | Mod: HCNC

## 2021-02-04 PROCEDURE — 36415 COLL VENOUS BLD VENIPUNCTURE: CPT | Mod: HCNC,PO

## 2021-02-04 PROCEDURE — 85025 COMPLETE CBC W/AUTO DIFF WBC: CPT | Mod: HCNC

## 2021-02-04 PROCEDURE — 84443 ASSAY THYROID STIM HORMONE: CPT | Mod: HCNC

## 2021-02-07 ENCOUNTER — PATIENT MESSAGE (OUTPATIENT)
Dept: FAMILY MEDICINE | Facility: CLINIC | Age: 66
End: 2021-02-07

## 2021-02-19 ENCOUNTER — PATIENT MESSAGE (OUTPATIENT)
Dept: FAMILY MEDICINE | Facility: CLINIC | Age: 66
End: 2021-02-19

## 2021-02-26 ENCOUNTER — OFFICE VISIT (OUTPATIENT)
Dept: OPTOMETRY | Facility: CLINIC | Age: 66
End: 2021-02-26
Payer: MEDICARE

## 2021-02-26 DIAGNOSIS — H04.202 LEFT EPIPHORA: ICD-10-CM

## 2021-02-26 DIAGNOSIS — H25.13 NUCLEAR SCLEROSIS OF BOTH EYES: Primary | ICD-10-CM

## 2021-02-26 DIAGNOSIS — H52.203 ASTIGMATISM WITH PRESBYOPIA, BILATERAL: ICD-10-CM

## 2021-02-26 DIAGNOSIS — H52.4 ASTIGMATISM WITH PRESBYOPIA, BILATERAL: ICD-10-CM

## 2021-02-26 PROCEDURE — 99999 PR PBB SHADOW E&M-EST. PATIENT-LVL II: CPT | Mod: PBBFAC,,, | Performed by: OPTOMETRIST

## 2021-02-26 PROCEDURE — 3288F PR FALLS RISK ASSESSMENT DOCUMENTED: ICD-10-PCS | Mod: CPTII,S$GLB,, | Performed by: OPTOMETRIST

## 2021-02-26 PROCEDURE — 92004 PR EYE EXAM, NEW PATIENT,COMPREHESV: ICD-10-PCS | Mod: S$GLB,,, | Performed by: OPTOMETRIST

## 2021-02-26 PROCEDURE — 92004 COMPRE OPH EXAM NEW PT 1/>: CPT | Mod: S$GLB,,, | Performed by: OPTOMETRIST

## 2021-02-26 PROCEDURE — 99999 PR PBB SHADOW E&M-EST. PATIENT-LVL II: ICD-10-PCS | Mod: PBBFAC,,, | Performed by: OPTOMETRIST

## 2021-02-26 PROCEDURE — 3288F FALL RISK ASSESSMENT DOCD: CPT | Mod: CPTII,S$GLB,, | Performed by: OPTOMETRIST

## 2021-02-26 PROCEDURE — 1126F AMNT PAIN NOTED NONE PRSNT: CPT | Mod: S$GLB,,, | Performed by: OPTOMETRIST

## 2021-02-26 PROCEDURE — 1101F PR PT FALLS ASSESS DOC 0-1 FALLS W/OUT INJ PAST YR: ICD-10-PCS | Mod: CPTII,S$GLB,, | Performed by: OPTOMETRIST

## 2021-02-26 PROCEDURE — 1126F PR PAIN SEVERITY QUANTIFIED, NO PAIN PRESENT: ICD-10-PCS | Mod: S$GLB,,, | Performed by: OPTOMETRIST

## 2021-02-26 PROCEDURE — 1101F PT FALLS ASSESS-DOCD LE1/YR: CPT | Mod: CPTII,S$GLB,, | Performed by: OPTOMETRIST

## 2021-03-03 ENCOUNTER — HOSPITAL ENCOUNTER (OUTPATIENT)
Dept: RADIOLOGY | Facility: HOSPITAL | Age: 66
Discharge: HOME | End: 2021-03-03
Attending: FAMILY MEDICINE
Payer: MEDICARE

## 2021-03-03 ENCOUNTER — PATIENT MESSAGE (OUTPATIENT)
Dept: OPTOMETRY | Facility: CLINIC | Age: 66
End: 2021-03-03

## 2021-03-03 DIAGNOSIS — Z12.31 ENCOUNTER FOR SCREENING MAMMOGRAM FOR BREAST CANCER: ICD-10-CM

## 2021-03-03 PROCEDURE — 77067 SCR MAMMO BI INCL CAD: CPT | Mod: TC,PO

## 2021-03-03 PROCEDURE — 77063 BREAST TOMOSYNTHESIS BI: CPT | Mod: 26,,, | Performed by: RADIOLOGY

## 2021-03-03 PROCEDURE — 77067 MAMMO DIGITAL SCREENING BILAT WITH TOMO: ICD-10-PCS | Mod: 26,,, | Performed by: RADIOLOGY

## 2021-03-03 PROCEDURE — 77067 SCR MAMMO BI INCL CAD: CPT | Mod: 26,,, | Performed by: RADIOLOGY

## 2021-03-03 PROCEDURE — 77063 MAMMO DIGITAL SCREENING BILAT WITH TOMO: ICD-10-PCS | Mod: 26,,, | Performed by: RADIOLOGY

## 2021-03-05 ENCOUNTER — TELEPHONE (OUTPATIENT)
Dept: RADIOLOGY | Facility: HOSPITAL | Age: 66
End: 2021-03-05

## 2021-03-09 ENCOUNTER — HOSPITAL ENCOUNTER (OUTPATIENT)
Dept: RADIOLOGY | Facility: HOSPITAL | Age: 66
Discharge: HOME | End: 2021-03-09
Attending: FAMILY MEDICINE
Payer: MEDICARE

## 2021-03-09 DIAGNOSIS — R92.8 ABNORMAL MAMMOGRAM: ICD-10-CM

## 2021-03-09 PROCEDURE — 77065 MAMMO DIGITAL DIAGNOSTIC RIGHT WITH TOMO: ICD-10-PCS | Mod: 26,RT,, | Performed by: RADIOLOGY

## 2021-03-09 PROCEDURE — 76642 ULTRASOUND BREAST LIMITED: CPT | Mod: TC,RT

## 2021-03-09 PROCEDURE — 77061 BREAST TOMOSYNTHESIS UNI: CPT | Mod: TC,RT

## 2021-03-09 PROCEDURE — 76642 US BREAST RIGHT LIMITED: ICD-10-PCS | Mod: 26,RT,, | Performed by: RADIOLOGY

## 2021-03-09 PROCEDURE — 77061 MAMMO DIGITAL DIAGNOSTIC RIGHT WITH TOMO: ICD-10-PCS | Mod: 26,RT,, | Performed by: RADIOLOGY

## 2021-03-09 PROCEDURE — 77061 BREAST TOMOSYNTHESIS UNI: CPT | Mod: 26,RT,, | Performed by: RADIOLOGY

## 2021-03-09 PROCEDURE — 76642 ULTRASOUND BREAST LIMITED: CPT | Mod: 26,RT,, | Performed by: RADIOLOGY

## 2021-03-09 PROCEDURE — 77065 DX MAMMO INCL CAD UNI: CPT | Mod: 26,RT,, | Performed by: RADIOLOGY

## 2021-03-12 DIAGNOSIS — M81.0 OSTEOPOROSIS, UNSPECIFIED OSTEOPOROSIS TYPE, UNSPECIFIED PATHOLOGICAL FRACTURE PRESENCE: Primary | ICD-10-CM

## 2021-03-15 ENCOUNTER — TELEPHONE (OUTPATIENT)
Dept: FAMILY MEDICINE | Facility: CLINIC | Age: 66
End: 2021-03-15

## 2021-03-15 DIAGNOSIS — R92.8 ABNORMAL MAMMOGRAM OF RIGHT BREAST: Primary | ICD-10-CM

## 2021-03-25 ENCOUNTER — PATIENT MESSAGE (OUTPATIENT)
Dept: OPTOMETRY | Facility: CLINIC | Age: 66
End: 2021-03-25

## 2021-03-26 ENCOUNTER — PATIENT MESSAGE (OUTPATIENT)
Dept: OPTOMETRY | Facility: CLINIC | Age: 66
End: 2021-03-26

## 2021-03-26 DIAGNOSIS — H04.202 LEFT EPIPHORA: Primary | ICD-10-CM

## 2021-03-26 RX ORDER — NEOMYCIN SULFATE, POLYMYXIN B SULFATE AND DEXAMETHASONE 3.5; 10000; 1 MG/ML; [USP'U]/ML; MG/ML
SUSPENSION/ DROPS OPHTHALMIC
Qty: 5 ML | Refills: 0 | Status: SHIPPED | OUTPATIENT
Start: 2021-03-26 | End: 2021-04-13

## 2021-03-29 ENCOUNTER — TELEPHONE (OUTPATIENT)
Dept: OPTOMETRY | Facility: CLINIC | Age: 66
End: 2021-03-29

## 2021-04-06 ENCOUNTER — INFUSION (OUTPATIENT)
Dept: INFECTIOUS DISEASES | Facility: HOSPITAL | Age: 66
End: 2021-04-06
Attending: INTERNAL MEDICINE
Payer: MEDICARE

## 2021-04-06 VITALS — BODY MASS INDEX: 23.64 KG/M2 | HEIGHT: 64 IN | WEIGHT: 138.44 LBS

## 2021-04-06 DIAGNOSIS — M81.0 OSTEOPOROSIS, UNSPECIFIED OSTEOPOROSIS TYPE, UNSPECIFIED PATHOLOGICAL FRACTURE PRESENCE: Primary | ICD-10-CM

## 2021-04-06 PROCEDURE — 63600175 PHARM REV CODE 636 W HCPCS: Mod: JG | Performed by: PHYSICIAN ASSISTANT

## 2021-04-06 PROCEDURE — 96372 THER/PROPH/DIAG INJ SC/IM: CPT

## 2021-04-06 RX ADMIN — DENOSUMAB 60 MG: 60 INJECTION SUBCUTANEOUS at 10:04

## 2021-04-09 ENCOUNTER — TELEPHONE (OUTPATIENT)
Dept: FAMILY MEDICINE | Facility: CLINIC | Age: 66
End: 2021-04-09

## 2021-04-09 ENCOUNTER — OFFICE VISIT (OUTPATIENT)
Dept: FAMILY MEDICINE | Facility: CLINIC | Age: 66
End: 2021-04-09
Payer: MEDICARE

## 2021-04-09 VITALS
SYSTOLIC BLOOD PRESSURE: 122 MMHG | HEART RATE: 59 BPM | BODY MASS INDEX: 23.56 KG/M2 | HEIGHT: 64 IN | WEIGHT: 138 LBS | OXYGEN SATURATION: 99 % | DIASTOLIC BLOOD PRESSURE: 62 MMHG

## 2021-04-09 DIAGNOSIS — R03.0 ELEVATED BP WITHOUT DIAGNOSIS OF HYPERTENSION: Primary | ICD-10-CM

## 2021-04-09 PROCEDURE — 1101F PR PT FALLS ASSESS DOC 0-1 FALLS W/OUT INJ PAST YR: ICD-10-PCS | Mod: CPTII,S$GLB,, | Performed by: FAMILY MEDICINE

## 2021-04-09 PROCEDURE — 1126F AMNT PAIN NOTED NONE PRSNT: CPT | Mod: S$GLB,,, | Performed by: FAMILY MEDICINE

## 2021-04-09 PROCEDURE — 3008F BODY MASS INDEX DOCD: CPT | Mod: CPTII,S$GLB,, | Performed by: FAMILY MEDICINE

## 2021-04-09 PROCEDURE — 1126F PR PAIN SEVERITY QUANTIFIED, NO PAIN PRESENT: ICD-10-PCS | Mod: S$GLB,,, | Performed by: FAMILY MEDICINE

## 2021-04-09 PROCEDURE — 3008F PR BODY MASS INDEX (BMI) DOCUMENTED: ICD-10-PCS | Mod: CPTII,S$GLB,, | Performed by: FAMILY MEDICINE

## 2021-04-09 PROCEDURE — 3288F PR FALLS RISK ASSESSMENT DOCUMENTED: ICD-10-PCS | Mod: CPTII,S$GLB,, | Performed by: FAMILY MEDICINE

## 2021-04-09 PROCEDURE — 3288F FALL RISK ASSESSMENT DOCD: CPT | Mod: CPTII,S$GLB,, | Performed by: FAMILY MEDICINE

## 2021-04-09 PROCEDURE — 99211 PR OFFICE/OUTPT VISIT, EST, LEVL I: ICD-10-PCS | Mod: S$GLB,,, | Performed by: FAMILY MEDICINE

## 2021-04-09 PROCEDURE — 99999 PR PBB SHADOW E&M-EST. PATIENT-LVL III: ICD-10-PCS | Mod: PBBFAC,,,

## 2021-04-09 PROCEDURE — 99211 OFF/OP EST MAY X REQ PHY/QHP: CPT | Mod: S$GLB,,, | Performed by: FAMILY MEDICINE

## 2021-04-09 PROCEDURE — 1101F PT FALLS ASSESS-DOCD LE1/YR: CPT | Mod: CPTII,S$GLB,, | Performed by: FAMILY MEDICINE

## 2021-04-09 PROCEDURE — 99999 PR PBB SHADOW E&M-EST. PATIENT-LVL III: CPT | Mod: PBBFAC,,,

## 2021-04-11 ENCOUNTER — PATIENT MESSAGE (OUTPATIENT)
Dept: DERMATOLOGY | Facility: CLINIC | Age: 66
End: 2021-04-11

## 2021-04-13 ENCOUNTER — PATIENT OUTREACH (OUTPATIENT)
Dept: ADMINISTRATIVE | Facility: OTHER | Age: 66
End: 2021-04-13

## 2021-04-13 ENCOUNTER — OFFICE VISIT (OUTPATIENT)
Dept: DERMATOLOGY | Facility: CLINIC | Age: 66
End: 2021-04-13
Attending: FAMILY MEDICINE
Payer: MEDICARE

## 2021-04-13 DIAGNOSIS — D22.4 MELANOCYTIC NEVI OF SCALP AND NECK: ICD-10-CM

## 2021-04-13 DIAGNOSIS — R23.8 LONGITUDINAL RIDGING OF NAIL: ICD-10-CM

## 2021-04-13 DIAGNOSIS — D18.01 ANGIOMA OF SKIN: ICD-10-CM

## 2021-04-13 DIAGNOSIS — D23.9 DERMATOFIBROMA: ICD-10-CM

## 2021-04-13 DIAGNOSIS — D22.5 MELANOCYTIC NEVI OF TRUNK: ICD-10-CM

## 2021-04-13 DIAGNOSIS — L24.0 IRRITANT CONTACT DERMATITIS DUE TO DETERGENT: Primary | ICD-10-CM

## 2021-04-13 DIAGNOSIS — L30.9 HAND ECZEMA: ICD-10-CM

## 2021-04-13 DIAGNOSIS — D22.30 MELANOCYTIC NEVI OF FACE: ICD-10-CM

## 2021-04-13 PROCEDURE — 1159F MED LIST DOCD IN RCRD: CPT | Mod: S$GLB,,, | Performed by: DERMATOLOGY

## 2021-04-13 PROCEDURE — 1126F PR PAIN SEVERITY QUANTIFIED, NO PAIN PRESENT: ICD-10-PCS | Mod: S$GLB,,, | Performed by: DERMATOLOGY

## 2021-04-13 PROCEDURE — 3288F PR FALLS RISK ASSESSMENT DOCUMENTED: ICD-10-PCS | Mod: CPTII,S$GLB,, | Performed by: DERMATOLOGY

## 2021-04-13 PROCEDURE — 3288F FALL RISK ASSESSMENT DOCD: CPT | Mod: CPTII,S$GLB,, | Performed by: DERMATOLOGY

## 2021-04-13 PROCEDURE — 1159F PR MEDICATION LIST DOCUMENTED IN MEDICAL RECORD: ICD-10-PCS | Mod: S$GLB,,, | Performed by: DERMATOLOGY

## 2021-04-13 PROCEDURE — 1101F PR PT FALLS ASSESS DOC 0-1 FALLS W/OUT INJ PAST YR: ICD-10-PCS | Mod: CPTII,S$GLB,, | Performed by: DERMATOLOGY

## 2021-04-13 PROCEDURE — 99204 OFFICE O/P NEW MOD 45 MIN: CPT | Mod: S$GLB,,, | Performed by: DERMATOLOGY

## 2021-04-13 PROCEDURE — 1101F PT FALLS ASSESS-DOCD LE1/YR: CPT | Mod: CPTII,S$GLB,, | Performed by: DERMATOLOGY

## 2021-04-13 PROCEDURE — 1126F AMNT PAIN NOTED NONE PRSNT: CPT | Mod: S$GLB,,, | Performed by: DERMATOLOGY

## 2021-04-13 PROCEDURE — 99204 PR OFFICE/OUTPT VISIT, NEW, LEVL IV, 45-59 MIN: ICD-10-PCS | Mod: S$GLB,,, | Performed by: DERMATOLOGY

## 2021-04-13 RX ORDER — TRIAMCINOLONE ACETONIDE 1 MG/G
CREAM TOPICAL
Qty: 80 G | Refills: 3 | Status: SHIPPED | OUTPATIENT
Start: 2021-04-13 | End: 2023-08-11 | Stop reason: SDUPTHER

## 2021-07-15 ENCOUNTER — PATIENT MESSAGE (OUTPATIENT)
Dept: INTERNAL MEDICINE | Facility: CLINIC | Age: 66
End: 2021-07-15

## 2021-10-07 ENCOUNTER — INFUSION (OUTPATIENT)
Dept: INFECTIOUS DISEASES | Facility: HOSPITAL | Age: 66
End: 2021-10-07
Attending: INTERNAL MEDICINE
Payer: MEDICARE

## 2021-10-07 VITALS
SYSTOLIC BLOOD PRESSURE: 153 MMHG | DIASTOLIC BLOOD PRESSURE: 78 MMHG | BODY MASS INDEX: 22.55 KG/M2 | WEIGHT: 131.38 LBS | TEMPERATURE: 98 F | HEART RATE: 58 BPM | RESPIRATION RATE: 18 BRPM | OXYGEN SATURATION: 98 %

## 2021-10-07 DIAGNOSIS — M81.0 OSTEOPOROSIS, UNSPECIFIED OSTEOPOROSIS TYPE, UNSPECIFIED PATHOLOGICAL FRACTURE PRESENCE: Primary | ICD-10-CM

## 2021-10-07 PROCEDURE — 63600175 PHARM REV CODE 636 W HCPCS: Mod: JG,HCNC | Performed by: PHYSICIAN ASSISTANT

## 2021-10-07 PROCEDURE — 96372 THER/PROPH/DIAG INJ SC/IM: CPT | Mod: HCNC

## 2021-10-07 RX ADMIN — DENOSUMAB 60 MG: 60 INJECTION SUBCUTANEOUS at 10:10

## 2021-10-13 ENCOUNTER — TELEPHONE (OUTPATIENT)
Dept: FAMILY MEDICINE | Facility: CLINIC | Age: 66
End: 2021-10-13

## 2021-10-19 ENCOUNTER — OFFICE VISIT (OUTPATIENT)
Dept: FAMILY MEDICINE | Facility: CLINIC | Age: 66
End: 2021-10-19
Attending: FAMILY MEDICINE
Payer: MEDICARE

## 2021-10-19 ENCOUNTER — LAB VISIT (OUTPATIENT)
Dept: LAB | Facility: HOSPITAL | Age: 66
End: 2021-10-19
Attending: FAMILY MEDICINE
Payer: MEDICARE

## 2021-10-19 VITALS
HEIGHT: 64 IN | BODY MASS INDEX: 22.36 KG/M2 | HEART RATE: 58 BPM | WEIGHT: 131 LBS | DIASTOLIC BLOOD PRESSURE: 70 MMHG | SYSTOLIC BLOOD PRESSURE: 122 MMHG | OXYGEN SATURATION: 96 %

## 2021-10-19 DIAGNOSIS — R79.89 ABNORMAL TSH: ICD-10-CM

## 2021-10-19 DIAGNOSIS — E03.9 ACQUIRED HYPOTHYROIDISM: ICD-10-CM

## 2021-10-19 DIAGNOSIS — R79.89 ABNORMAL TSH: Primary | ICD-10-CM

## 2021-10-19 DIAGNOSIS — R92.8 ABNORMAL MAMMOGRAM OF RIGHT BREAST: ICD-10-CM

## 2021-10-19 DIAGNOSIS — Z78.0 ASYMPTOMATIC MENOPAUSE: ICD-10-CM

## 2021-10-19 LAB
BILIRUB UR QL STRIP: NEGATIVE
CLARITY UR REFRACT.AUTO: CLEAR
COLOR UR AUTO: YELLOW
GLUCOSE UR QL STRIP: NEGATIVE
HGB UR QL STRIP: NEGATIVE
KETONES UR QL STRIP: NEGATIVE
LEUKOCYTE ESTERASE UR QL STRIP: NEGATIVE
NITRITE UR QL STRIP: NEGATIVE
PH UR STRIP: 5 [PH] (ref 5–8)
PROT UR QL STRIP: NEGATIVE
SP GR UR STRIP: 1.01 (ref 1–1.03)
T4 FREE SERPL-MCNC: 0.95 NG/DL (ref 0.71–1.51)
TSH SERPL DL<=0.005 MIU/L-ACNC: 5.9 UIU/ML (ref 0.4–4)
URN SPEC COLLECT METH UR: NORMAL

## 2021-10-19 PROCEDURE — 84443 ASSAY THYROID STIM HORMONE: CPT | Mod: HCNC | Performed by: FAMILY MEDICINE

## 2021-10-19 PROCEDURE — 3078F PR MOST RECENT DIASTOLIC BLOOD PRESSURE < 80 MM HG: ICD-10-PCS | Mod: HCNC,CPTII,S$GLB, | Performed by: FAMILY MEDICINE

## 2021-10-19 PROCEDURE — 3008F BODY MASS INDEX DOCD: CPT | Mod: HCNC,CPTII,S$GLB, | Performed by: FAMILY MEDICINE

## 2021-10-19 PROCEDURE — 99213 OFFICE O/P EST LOW 20 MIN: CPT | Mod: HCNC,S$GLB,, | Performed by: FAMILY MEDICINE

## 2021-10-19 PROCEDURE — 99499 RISK ADDL DX/OHS AUDIT: ICD-10-PCS | Mod: S$GLB,,, | Performed by: FAMILY MEDICINE

## 2021-10-19 PROCEDURE — 99213 PR OFFICE/OUTPT VISIT, EST, LEVL III, 20-29 MIN: ICD-10-PCS | Mod: HCNC,S$GLB,, | Performed by: FAMILY MEDICINE

## 2021-10-19 PROCEDURE — 3078F DIAST BP <80 MM HG: CPT | Mod: HCNC,CPTII,S$GLB, | Performed by: FAMILY MEDICINE

## 2021-10-19 PROCEDURE — 3074F SYST BP LT 130 MM HG: CPT | Mod: HCNC,CPTII,S$GLB, | Performed by: FAMILY MEDICINE

## 2021-10-19 PROCEDURE — 99499 UNLISTED E&M SERVICE: CPT | Mod: S$GLB,,, | Performed by: FAMILY MEDICINE

## 2021-10-19 PROCEDURE — 1159F MED LIST DOCD IN RCRD: CPT | Mod: HCNC,CPTII,S$GLB, | Performed by: FAMILY MEDICINE

## 2021-10-19 PROCEDURE — 3008F PR BODY MASS INDEX (BMI) DOCUMENTED: ICD-10-PCS | Mod: HCNC,CPTII,S$GLB, | Performed by: FAMILY MEDICINE

## 2021-10-19 PROCEDURE — 99999 PR PBB SHADOW E&M-EST. PATIENT-LVL III: CPT | Mod: PBBFAC,HCNC,, | Performed by: FAMILY MEDICINE

## 2021-10-19 PROCEDURE — 1101F PT FALLS ASSESS-DOCD LE1/YR: CPT | Mod: HCNC,CPTII,S$GLB, | Performed by: FAMILY MEDICINE

## 2021-10-19 PROCEDURE — 1126F AMNT PAIN NOTED NONE PRSNT: CPT | Mod: HCNC,CPTII,S$GLB, | Performed by: FAMILY MEDICINE

## 2021-10-19 PROCEDURE — 81003 URINALYSIS AUTO W/O SCOPE: CPT | Mod: HCNC | Performed by: FAMILY MEDICINE

## 2021-10-19 PROCEDURE — 36415 COLL VENOUS BLD VENIPUNCTURE: CPT | Mod: HCNC,PO | Performed by: FAMILY MEDICINE

## 2021-10-19 PROCEDURE — 99999 PR PBB SHADOW E&M-EST. PATIENT-LVL III: ICD-10-PCS | Mod: PBBFAC,HCNC,, | Performed by: FAMILY MEDICINE

## 2021-10-19 PROCEDURE — 84439 ASSAY OF FREE THYROXINE: CPT | Mod: HCNC | Performed by: FAMILY MEDICINE

## 2021-10-19 PROCEDURE — 1159F PR MEDICATION LIST DOCUMENTED IN MEDICAL RECORD: ICD-10-PCS | Mod: HCNC,CPTII,S$GLB, | Performed by: FAMILY MEDICINE

## 2021-10-19 PROCEDURE — 1101F PR PT FALLS ASSESS DOC 0-1 FALLS W/OUT INJ PAST YR: ICD-10-PCS | Mod: HCNC,CPTII,S$GLB, | Performed by: FAMILY MEDICINE

## 2021-10-19 PROCEDURE — 1126F PR PAIN SEVERITY QUANTIFIED, NO PAIN PRESENT: ICD-10-PCS | Mod: HCNC,CPTII,S$GLB, | Performed by: FAMILY MEDICINE

## 2021-10-19 PROCEDURE — 3074F PR MOST RECENT SYSTOLIC BLOOD PRESSURE < 130 MM HG: ICD-10-PCS | Mod: HCNC,CPTII,S$GLB, | Performed by: FAMILY MEDICINE

## 2021-10-19 PROCEDURE — 3288F PR FALLS RISK ASSESSMENT DOCUMENTED: ICD-10-PCS | Mod: HCNC,CPTII,S$GLB, | Performed by: FAMILY MEDICINE

## 2021-10-19 PROCEDURE — 3288F FALL RISK ASSESSMENT DOCD: CPT | Mod: HCNC,CPTII,S$GLB, | Performed by: FAMILY MEDICINE

## 2021-10-19 RX ORDER — ZOSTER VACCINE RECOMBINANT, ADJUVANTED 50 MCG/0.5
0.5 KIT INTRAMUSCULAR ONCE
Qty: 1 EACH | Refills: 1 | Status: SHIPPED | OUTPATIENT
Start: 2021-10-19 | End: 2021-10-19

## 2021-10-26 ENCOUNTER — HOSPITAL ENCOUNTER (OUTPATIENT)
Dept: RADIOLOGY | Facility: HOSPITAL | Age: 66
Discharge: HOME | End: 2021-10-26
Attending: FAMILY MEDICINE
Payer: MEDICARE

## 2021-10-26 DIAGNOSIS — R92.8 ABNORMAL MAMMOGRAM OF RIGHT BREAST: ICD-10-CM

## 2021-10-26 PROCEDURE — 77061 MAMMO DIGITAL DIAGNOSTIC RIGHT WITH TOMO: ICD-10-PCS | Mod: 26,HCNC,RT, | Performed by: RADIOLOGY

## 2021-10-26 PROCEDURE — 77065 MAMMO DIGITAL DIAGNOSTIC RIGHT WITH TOMO: ICD-10-PCS | Mod: 26,HCNC,RT, | Performed by: RADIOLOGY

## 2021-10-26 PROCEDURE — 77061 BREAST TOMOSYNTHESIS UNI: CPT | Mod: 26,HCNC,RT, | Performed by: RADIOLOGY

## 2021-10-26 PROCEDURE — 77065 DX MAMMO INCL CAD UNI: CPT | Mod: 26,HCNC,RT, | Performed by: RADIOLOGY

## 2021-10-26 PROCEDURE — 77061 BREAST TOMOSYNTHESIS UNI: CPT | Mod: TC,HCNC,RT

## 2021-11-11 ENCOUNTER — HOSPITAL ENCOUNTER (OUTPATIENT)
Dept: RADIOLOGY | Facility: CLINIC | Age: 66
Discharge: HOME | End: 2021-11-11
Attending: FAMILY MEDICINE
Payer: MEDICARE

## 2021-11-11 DIAGNOSIS — Z78.0 ASYMPTOMATIC MENOPAUSE: ICD-10-CM

## 2021-11-11 PROCEDURE — 77080 DEXA BONE DENSITY SPINE HIP: ICD-10-PCS | Mod: 26,HCNC,, | Performed by: INTERNAL MEDICINE

## 2021-11-11 PROCEDURE — 77080 DXA BONE DENSITY AXIAL: CPT | Mod: 26,HCNC,, | Performed by: INTERNAL MEDICINE

## 2021-11-11 PROCEDURE — 77080 DXA BONE DENSITY AXIAL: CPT | Mod: TC,HCNC

## 2021-11-23 ENCOUNTER — TELEPHONE (OUTPATIENT)
Dept: FAMILY MEDICINE | Facility: CLINIC | Age: 66
End: 2021-11-23
Payer: MEDICARE

## 2021-11-23 RX ORDER — ALENDRONATE SODIUM 70 MG/1
70 TABLET ORAL
Qty: 4 TABLET | Refills: 11 | Status: ON HOLD | OUTPATIENT
Start: 2021-11-23 | End: 2022-04-29

## 2021-11-23 RX ORDER — LEVOTHYROXINE SODIUM 50 UG/1
50 TABLET ORAL
Qty: 30 TABLET | Refills: 11 | Status: SHIPPED | OUTPATIENT
Start: 2021-11-23 | End: 2022-01-21

## 2022-01-14 ENCOUNTER — PATIENT MESSAGE (OUTPATIENT)
Dept: FAMILY MEDICINE | Facility: CLINIC | Age: 67
End: 2022-01-14
Payer: MEDICARE

## 2022-01-21 DIAGNOSIS — R79.89 ABNORMAL TSH: Primary | ICD-10-CM

## 2022-01-24 ENCOUNTER — PATIENT MESSAGE (OUTPATIENT)
Dept: ORTHOPEDICS | Facility: CLINIC | Age: 67
End: 2022-01-24
Payer: MEDICARE

## 2022-01-24 ENCOUNTER — TELEPHONE (OUTPATIENT)
Dept: ORTHOPEDICS | Facility: CLINIC | Age: 67
End: 2022-01-24
Payer: MEDICARE

## 2022-01-24 ENCOUNTER — PATIENT MESSAGE (OUTPATIENT)
Dept: FAMILY MEDICINE | Facility: CLINIC | Age: 67
End: 2022-01-24
Payer: MEDICARE

## 2022-03-28 ENCOUNTER — OFFICE VISIT (OUTPATIENT)
Dept: ENDOCRINOLOGY | Facility: CLINIC | Age: 67
End: 2022-03-28
Attending: FAMILY MEDICINE
Payer: MEDICARE

## 2022-03-28 ENCOUNTER — LAB VISIT (OUTPATIENT)
Dept: LAB | Facility: OTHER | Age: 67
End: 2022-03-28
Attending: INTERNAL MEDICINE
Payer: MEDICARE

## 2022-03-28 ENCOUNTER — PATIENT OUTREACH (OUTPATIENT)
Dept: ADMINISTRATIVE | Facility: OTHER | Age: 67
End: 2022-03-28
Payer: MEDICARE

## 2022-03-28 VITALS
DIASTOLIC BLOOD PRESSURE: 69 MMHG | HEIGHT: 64 IN | WEIGHT: 130.94 LBS | HEART RATE: 57 BPM | BODY MASS INDEX: 22.35 KG/M2 | SYSTOLIC BLOOD PRESSURE: 176 MMHG

## 2022-03-28 DIAGNOSIS — R68.89 COLD INTOLERANCE: ICD-10-CM

## 2022-03-28 DIAGNOSIS — E03.8 SUBCLINICAL HYPOTHYROIDISM: Primary | ICD-10-CM

## 2022-03-28 DIAGNOSIS — M81.0 AGE-RELATED OSTEOPOROSIS WITHOUT CURRENT PATHOLOGICAL FRACTURE: ICD-10-CM

## 2022-03-28 DIAGNOSIS — R79.89 OTHER SPECIFIED ABNORMAL FINDINGS OF BLOOD CHEMISTRY: ICD-10-CM

## 2022-03-28 DIAGNOSIS — R53.83 FATIGUE, UNSPECIFIED TYPE: ICD-10-CM

## 2022-03-28 DIAGNOSIS — R03.0 ELEVATED BP WITHOUT DIAGNOSIS OF HYPERTENSION: ICD-10-CM

## 2022-03-28 DIAGNOSIS — E55.9 VITAMIN D INSUFFICIENCY: ICD-10-CM

## 2022-03-28 DIAGNOSIS — R79.89 ABNORMAL TSH: ICD-10-CM

## 2022-03-28 DIAGNOSIS — E03.8 SUBCLINICAL HYPOTHYROIDISM: ICD-10-CM

## 2022-03-28 LAB
ALBUMIN SERPL BCP-MCNC: 4.3 G/DL (ref 3.5–5.2)
ALP SERPL-CCNC: 51 U/L (ref 55–135)
ALT SERPL W/O P-5'-P-CCNC: 17 U/L (ref 10–44)
ANION GAP SERPL CALC-SCNC: 11 MMOL/L (ref 8–16)
AST SERPL-CCNC: 18 U/L (ref 10–40)
BASOPHILS # BLD AUTO: 0.02 K/UL (ref 0–0.2)
BASOPHILS NFR BLD: 0.4 % (ref 0–1.9)
BILIRUB SERPL-MCNC: 0.6 MG/DL (ref 0.1–1)
BUN SERPL-MCNC: 18 MG/DL (ref 8–23)
CALCIUM SERPL-MCNC: 9.7 MG/DL (ref 8.7–10.5)
CHLORIDE SERPL-SCNC: 104 MMOL/L (ref 95–110)
CO2 SERPL-SCNC: 27 MMOL/L (ref 23–29)
CREAT SERPL-MCNC: 0.7 MG/DL (ref 0.5–1.4)
DIFFERENTIAL METHOD: NORMAL
EOSINOPHIL # BLD AUTO: 0.1 K/UL (ref 0–0.5)
EOSINOPHIL NFR BLD: 2.2 % (ref 0–8)
ERYTHROCYTE [DISTWIDTH] IN BLOOD BY AUTOMATED COUNT: 12.8 % (ref 11.5–14.5)
EST. GFR  (AFRICAN AMERICAN): >60 ML/MIN/1.73 M^2
EST. GFR  (NON AFRICAN AMERICAN): >60 ML/MIN/1.73 M^2
FERRITIN SERPL-MCNC: 59 NG/ML (ref 20–300)
GLUCOSE SERPL-MCNC: 100 MG/DL (ref 70–110)
HCT VFR BLD AUTO: 41.8 % (ref 37–48.5)
HGB BLD-MCNC: 14.1 G/DL (ref 12–16)
IMM GRANULOCYTES # BLD AUTO: 0.02 K/UL (ref 0–0.04)
IMM GRANULOCYTES NFR BLD AUTO: 0.4 % (ref 0–0.5)
LYMPHOCYTES # BLD AUTO: 1 K/UL (ref 1–4.8)
LYMPHOCYTES NFR BLD: 20.8 % (ref 18–48)
MCH RBC QN AUTO: 30.3 PG (ref 27–31)
MCHC RBC AUTO-ENTMCNC: 33.7 G/DL (ref 32–36)
MCV RBC AUTO: 90 FL (ref 82–98)
MONOCYTES # BLD AUTO: 0.5 K/UL (ref 0.3–1)
MONOCYTES NFR BLD: 10.1 % (ref 4–15)
NEUTROPHILS # BLD AUTO: 3.3 K/UL (ref 1.8–7.7)
NEUTROPHILS NFR BLD: 66.1 % (ref 38–73)
NRBC BLD-RTO: 0 /100 WBC
PLATELET # BLD AUTO: 229 K/UL (ref 150–450)
PMV BLD AUTO: 9.2 FL (ref 9.2–12.9)
POTASSIUM SERPL-SCNC: 4.8 MMOL/L (ref 3.5–5.1)
PROT SERPL-MCNC: 7 G/DL (ref 6–8.4)
RBC # BLD AUTO: 4.66 M/UL (ref 4–5.4)
SODIUM SERPL-SCNC: 142 MMOL/L (ref 136–145)
T4 FREE SERPL-MCNC: 1.01 NG/DL (ref 0.71–1.51)
THYROPEROXIDASE IGG SERPL-ACNC: 51.3 IU/ML
TSH SERPL DL<=0.005 MIU/L-ACNC: 2.71 UIU/ML (ref 0.4–4)
WBC # BLD AUTO: 4.96 K/UL (ref 3.9–12.7)

## 2022-03-28 PROCEDURE — 80053 COMPREHEN METABOLIC PANEL: CPT | Performed by: INTERNAL MEDICINE

## 2022-03-28 PROCEDURE — 3077F SYST BP >= 140 MM HG: CPT | Mod: CPTII,S$GLB,, | Performed by: INTERNAL MEDICINE

## 2022-03-28 PROCEDURE — 82728 ASSAY OF FERRITIN: CPT | Performed by: INTERNAL MEDICINE

## 2022-03-28 PROCEDURE — 3008F BODY MASS INDEX DOCD: CPT | Mod: CPTII,S$GLB,, | Performed by: INTERNAL MEDICINE

## 2022-03-28 PROCEDURE — 1159F MED LIST DOCD IN RCRD: CPT | Mod: CPTII,S$GLB,, | Performed by: INTERNAL MEDICINE

## 2022-03-28 PROCEDURE — 1160F PR REVIEW ALL MEDS BY PRESCRIBER/CLIN PHARMACIST DOCUMENTED: ICD-10-PCS | Mod: CPTII,S$GLB,, | Performed by: INTERNAL MEDICINE

## 2022-03-28 PROCEDURE — 3078F PR MOST RECENT DIASTOLIC BLOOD PRESSURE < 80 MM HG: ICD-10-PCS | Mod: CPTII,S$GLB,, | Performed by: INTERNAL MEDICINE

## 2022-03-28 PROCEDURE — 3008F PR BODY MASS INDEX (BMI) DOCUMENTED: ICD-10-PCS | Mod: CPTII,S$GLB,, | Performed by: INTERNAL MEDICINE

## 2022-03-28 PROCEDURE — 84439 ASSAY OF FREE THYROXINE: CPT | Performed by: INTERNAL MEDICINE

## 2022-03-28 PROCEDURE — 1126F PR PAIN SEVERITY QUANTIFIED, NO PAIN PRESENT: ICD-10-PCS | Mod: CPTII,S$GLB,, | Performed by: INTERNAL MEDICINE

## 2022-03-28 PROCEDURE — 3078F DIAST BP <80 MM HG: CPT | Mod: CPTII,S$GLB,, | Performed by: INTERNAL MEDICINE

## 2022-03-28 PROCEDURE — 1126F AMNT PAIN NOTED NONE PRSNT: CPT | Mod: CPTII,S$GLB,, | Performed by: INTERNAL MEDICINE

## 2022-03-28 PROCEDURE — 3077F PR MOST RECENT SYSTOLIC BLOOD PRESSURE >= 140 MM HG: ICD-10-PCS | Mod: CPTII,S$GLB,, | Performed by: INTERNAL MEDICINE

## 2022-03-28 PROCEDURE — 99204 PR OFFICE/OUTPT VISIT, NEW, LEVL IV, 45-59 MIN: ICD-10-PCS | Mod: S$GLB,,, | Performed by: INTERNAL MEDICINE

## 2022-03-28 PROCEDURE — 1101F PT FALLS ASSESS-DOCD LE1/YR: CPT | Mod: CPTII,S$GLB,, | Performed by: INTERNAL MEDICINE

## 2022-03-28 PROCEDURE — 3288F PR FALLS RISK ASSESSMENT DOCUMENTED: ICD-10-PCS | Mod: CPTII,S$GLB,, | Performed by: INTERNAL MEDICINE

## 2022-03-28 PROCEDURE — 1159F PR MEDICATION LIST DOCUMENTED IN MEDICAL RECORD: ICD-10-PCS | Mod: CPTII,S$GLB,, | Performed by: INTERNAL MEDICINE

## 2022-03-28 PROCEDURE — 36415 COLL VENOUS BLD VENIPUNCTURE: CPT | Performed by: INTERNAL MEDICINE

## 2022-03-28 PROCEDURE — 85025 COMPLETE CBC W/AUTO DIFF WBC: CPT | Performed by: INTERNAL MEDICINE

## 2022-03-28 PROCEDURE — 86376 MICROSOMAL ANTIBODY EACH: CPT | Performed by: INTERNAL MEDICINE

## 2022-03-28 PROCEDURE — 1101F PR PT FALLS ASSESS DOC 0-1 FALLS W/OUT INJ PAST YR: ICD-10-PCS | Mod: CPTII,S$GLB,, | Performed by: INTERNAL MEDICINE

## 2022-03-28 PROCEDURE — 84443 ASSAY THYROID STIM HORMONE: CPT | Performed by: INTERNAL MEDICINE

## 2022-03-28 PROCEDURE — 3288F FALL RISK ASSESSMENT DOCD: CPT | Mod: CPTII,S$GLB,, | Performed by: INTERNAL MEDICINE

## 2022-03-28 PROCEDURE — 99204 OFFICE O/P NEW MOD 45 MIN: CPT | Mod: S$GLB,,, | Performed by: INTERNAL MEDICINE

## 2022-03-28 PROCEDURE — 1160F RVW MEDS BY RX/DR IN RCRD: CPT | Mod: CPTII,S$GLB,, | Performed by: INTERNAL MEDICINE

## 2022-03-28 NOTE — ASSESSMENT & PLAN NOTE
Fatigue and brain fog after exercise, feeling cold often   check anemia, iron, etc.   otherwise keep f/u with PCP

## 2022-03-28 NOTE — ASSESSMENT & PLAN NOTE
BP high today   not on meds   was normal last time   keep f/u with PCP, monitor BP, if staying high would benefit from HTN treatment

## 2022-03-28 NOTE — ASSESSMENT & PLAN NOTE
Lab Results   Component Value Date    TSH 5.897 (H) 10/19/2021    FREET4 0.95 10/19/2021     Reviewed last labs.  TSH high, in the 5s, since 2014  Free t4 normal   clinically denies most symptoms other than sometimes fatigue/brain fog after exercise, feeling cold  Had trial of medication, felt worse, stopped.  Recheck labs.    consider treatment if TSH >10, free t4 low, or several symptoms develop   otherwise monitor. TSH normally increases as time goes on, normal level into the 5s range not uncommon when approaching 70.

## 2022-03-28 NOTE — PROGRESS NOTES
Subjective:      Chief Complaint: Hypothyroidism    HPI: Niya Durbin is a 67 y.o. female who is here for an initial evaluation for thyroid.    With regards to her subclinical hypothyroidism:    TSH elevated since at least 11/2014. In the 5s. Max 5.9 in the system here. free t4 remained normal    Tried levothyroxine 50 mcg in 11/2021, took 1 dose, didn't sleep that night, stopped the medication.    Lab Results   Component Value Date    TSH 5.897 (H) 10/19/2021    FREET4 0.95 10/19/2021     Also has osteoporosis.    Fractures: Yes. Fall in 2018, sacrum fracture at that time.    DXA 11/2021. FRAX 25% major, 7.3% hip  Lumbar spine (L1-L4):              BMD is 0.872 g/cm2, T-score is -1.6, and Z-score is 0.4.  Total hip:                                BMD is 0.585 g/cm2, T-score is -2.9, and Z-score is -1.5. 3.9% improved.  Femoral neck:                          BMD is 0.523 g/cm2, T-score is -2.9, and Z-score is -1.2.     Prior DXA 09/25/2018.  Lumbar spine:    BMD 0.874 g/cm2 and T-score -1.6.  Total Hip:           BMD 0.563 g/cm2 and T-score -3.1.    Medication:  Fosamax 70 mg weekly. (re)Started 11/2021.  Prolia q6 months. Started around 9/2018 (after fracture in 2018). Last dose 10/2021    Prior medication: Fosamax. Started around 12/2014 - 2016 or so. Unclear if she was still taking in [id number] as last prescription in 2016 was 4 tablets without any refills.    Last labs:    Lab Results   Component Value Date    CALCIUM 8.8 02/04/2021    ALBUMIN 4.1 02/04/2021    CREATININE 0.8 02/04/2021    [id number] 28 (L) 08/28/2018    PTH 72.0 08/28/2018     Vitamins:   vitamin D  units/day    Exercise:   few/week. walking. weights    Symptoms  No   Yes  [x]    []  Weight gain  [x]    []  Fatigue  [x]    []  Constipation  []    [x]  Cold intolerance    [x]    []  Weight loss  [x]    []  Insomnia  [x]    []  Diarrhea  [x]    []  Heat intolerance  []    [x]  Palpitations, rare  [x]    []  Tremor    Sometimes  brain fog the day after exercises, better in a day or so later    Reviewed past medical, family, social history and updated as appropriate.    Review of Systems  As above    Objective:     Vitals:    03/28/22 1000   BP: (!) 176/69   Pulse: (!) 57     BP Readings from Last 5 Encounters:   03/28/22 (!) 176/69   10/19/21 122/70   10/07/21 (!) 153/78   04/09/21 122/62   02/03/21 (!) 144/80     Physical Exam  Vitals reviewed.   Constitutional:       General: She is not in acute distress.  Neck:      Thyroid: No thyromegaly.   Cardiovascular:      Heart sounds: Normal heart sounds.   Pulmonary:      Effort: Pulmonary effort is normal.       Wt Readings from Last 5 Encounters:   03/28/22 1000 59.4 kg (130 lb 15.3 oz)   10/19/21 1053 59.4 kg (131 lb)   10/07/21 1028 59.6 kg (131 lb 6.3 oz)   04/09/21 1429 62.6 kg (138 lb)   04/06/21 1009 62.8 kg (138 lb 7.2 oz)     No results found for: HGBA1C  Lab Results   Component Value Date    CHOL 172 02/04/2021    CHOL 182 11/13/2014    HDL 66 02/04/2021    HDL 77 (H) 11/13/2014    LDLCALC 94.2 02/04/2021    LDLCALC 96.8 11/13/2014    TRIG 59 02/04/2021    TRIG 41 11/13/2014    CHOLHDL 38.4 02/04/2021    CHOLHDL 42.3 11/13/2014     Lab Results   Component Value Date     02/04/2021    K 5.0 02/04/2021     02/04/2021    CO2 28 02/04/2021    GLU 95 02/04/2021    BUN 16 02/04/2021    CREATININE 0.8 02/04/2021    CALCIUM 8.8 02/04/2021    PROT 6.5 02/04/2021    ALBUMIN 4.1 02/04/2021    BILITOT 0.7 02/04/2021    ALKPHOS 41 (L) 02/04/2021    AST 17 02/04/2021    ALT 12 02/04/2021    ANIONGAP 7 (L) 02/04/2021    ESTGFRAFRICA >60.0 02/04/2021    EGFRNONAA >60.0 02/04/2021    TSH 5.897 (H) 10/19/2021      No results found for: MICALBCREAT    Assessment/Plan:     Subclinical hypothyroidism  Lab Results   Component Value Date    TSH 5.897 (H) 10/19/2021    FREET4 0.95 10/19/2021     Reviewed last labs.  TSH high, in the 5s, since 2014  Free t4 normal   clinically denies most  symptoms other than sometimes fatigue/brain fog after exercise, feeling cold  Had trial of medication, felt worse, stopped.  Recheck labs.    consider treatment if TSH >10, free t4 low, or several symptoms develop   otherwise monitor. TSH normally increases as time goes on, normal level into the 5s range not uncommon when approaching 70.      Osteoporosis  For several years   Fracture in 2018 after a fall, none lately.  Last DXA still with osteoporosis, though stable/improved slightly from prior.   on prolia still.   PCP also started fosamax a few months ago.   strange regimen to be on two anti-resorptive agents. Do not recommend that combination. Normally best to pick one. With last DXA stable, pt on prolia for the last few years, would be reasonable to stay with that if tolerating okay and covered fine. If needing to stop, would be better to transition to fosamax.   avoid falls   continue vitamin D, calcium intake   monitor DXA after another 2 years      Vitamin D insufficiency  Slightly low in 2018   recheck on labs   increase supplementation if needed      Fatigue  Fatigue and brain fog after exercise, feeling cold often   check anemia, iron, etc.   otherwise keep f/u with PCP    Elevated BP without diagnosis of hypertension  BP high today   not on meds   was normal last time   keep f/u with PCP, monitor BP, if staying high would benefit from HTN treatment      Follow up in about 1 year (around 3/28/2023) for lab review, further monitoring.      Javier Goldman MD  Endocrinology

## 2022-03-28 NOTE — ASSESSMENT & PLAN NOTE
For several years   Fracture in 2018 after a fall, none lately.  Last DXA still with osteoporosis, though stable/improved slightly from prior.   on prolia still.   PCP also started fosamax a few months ago.   strange regimen to be on two anti-resorptive agents. Do not recommend that combination. Normally best to pick one. With last DXA stable, pt on prolia for the last few years, would be reasonable to stay with that if tolerating okay and covered fine. If needing to stop, would be better to transition to fosamax.   avoid falls   continue vitamin D, calcium intake   monitor DXA after another 2 years

## 2022-04-05 DIAGNOSIS — M81.0 OSTEOPOROSIS, UNSPECIFIED OSTEOPOROSIS TYPE, UNSPECIFIED PATHOLOGICAL FRACTURE PRESENCE: Primary | ICD-10-CM

## 2022-04-11 ENCOUNTER — INFUSION (OUTPATIENT)
Dept: INFECTIOUS DISEASES | Facility: HOSPITAL | Age: 67
End: 2022-04-11
Attending: INTERNAL MEDICINE
Payer: MEDICARE

## 2022-04-11 ENCOUNTER — TELEPHONE (OUTPATIENT)
Dept: ORTHOPEDICS | Facility: CLINIC | Age: 67
End: 2022-04-11
Payer: MEDICARE

## 2022-04-11 VITALS
SYSTOLIC BLOOD PRESSURE: 123 MMHG | TEMPERATURE: 98 F | DIASTOLIC BLOOD PRESSURE: 65 MMHG | BODY MASS INDEX: 22.12 KG/M2 | HEART RATE: 69 BPM | WEIGHT: 128.88 LBS | OXYGEN SATURATION: 96 %

## 2022-04-11 DIAGNOSIS — M81.0 OSTEOPOROSIS, UNSPECIFIED OSTEOPOROSIS TYPE, UNSPECIFIED PATHOLOGICAL FRACTURE PRESENCE: Primary | ICD-10-CM

## 2022-04-11 NOTE — PROGRESS NOTES
Pt arrived ambulatory for prolia injection. Pt questioned why she was on fosamex as well as prolia.Damian Shea and Dr. Cervantes notified. Dr. Cervantes responded and wanted Shabbir to decide. Unable to reach Shabbir and prolia held until April 20th. Pt will message Shabbir on [employer] for guidance. Pt discharged without incident.

## 2022-04-11 NOTE — TELEPHONE ENCOUNTER
Called patient to notify them that Damian is out of the office today. He will reach back out to her tomorrow when he is in clinic.     César Ziegler MS, OTC   Sports Medicine Assistant   [facility]  (P) [phone]  (F) [phone]

## 2022-04-19 ENCOUNTER — PATIENT MESSAGE (OUTPATIENT)
Dept: FAMILY MEDICINE | Facility: CLINIC | Age: 67
End: 2022-04-19
Payer: MEDICARE

## 2022-04-19 ENCOUNTER — TELEPHONE (OUTPATIENT)
Dept: INFECTIOUS DISEASES | Facility: HOSPITAL | Age: 67
End: 2022-04-19
Payer: MEDICARE

## 2022-04-19 NOTE — TELEPHONE ENCOUNTER
Reached out to Dr. Shea about Prolia injection.  Per MD patient is cleared to receive Prolia tomorrow 4/20/22.

## 2022-04-20 ENCOUNTER — INFUSION (OUTPATIENT)
Dept: INFECTIOUS DISEASES | Facility: HOSPITAL | Age: 67
End: 2022-04-20
Attending: INTERNAL MEDICINE
Payer: MEDICARE

## 2022-04-20 VITALS
OXYGEN SATURATION: 98 % | DIASTOLIC BLOOD PRESSURE: 75 MMHG | HEART RATE: 66 BPM | SYSTOLIC BLOOD PRESSURE: 126 MMHG | BODY MASS INDEX: 22.21 KG/M2 | WEIGHT: 129.44 LBS | TEMPERATURE: 98 F

## 2022-04-20 DIAGNOSIS — M81.0 OSTEOPOROSIS, UNSPECIFIED OSTEOPOROSIS TYPE, UNSPECIFIED PATHOLOGICAL FRACTURE PRESENCE: Primary | ICD-10-CM

## 2022-04-20 PROCEDURE — 96372 THER/PROPH/DIAG INJ SC/IM: CPT

## 2022-04-20 PROCEDURE — 63600175 PHARM REV CODE 636 W HCPCS: Mod: JG | Performed by: PHYSICIAN ASSISTANT

## 2022-04-20 RX ADMIN — DENOSUMAB 60 MG: 60 INJECTION SUBCUTANEOUS at 01:04

## 2022-04-20 NOTE — PROGRESS NOTES
Patient received Prolia 60 mg injection sub Q in left arm.  Tolerated well and left in NAD    Patient is taking Vit D

## 2022-04-23 ENCOUNTER — HOSPITAL ENCOUNTER (OUTPATIENT)
Facility: OTHER | Age: 67
Discharge: HOME | End: 2022-04-24
Attending: EMERGENCY MEDICINE | Admitting: EMERGENCY MEDICINE
Payer: MEDICARE

## 2022-04-23 DIAGNOSIS — S82.102A CLOSED FRACTURE OF PROXIMAL END OF LEFT TIBIA, UNSPECIFIED FRACTURE MORPHOLOGY, INITIAL ENCOUNTER: Primary | ICD-10-CM

## 2022-04-23 DIAGNOSIS — S82.832A CLOSED FRACTURE OF PROXIMAL END OF LEFT FIBULA, UNSPECIFIED FRACTURE MORPHOLOGY, INITIAL ENCOUNTER: ICD-10-CM

## 2022-04-23 DIAGNOSIS — M25.569 KNEE PAIN: ICD-10-CM

## 2022-04-23 DIAGNOSIS — M79.606 LEG PAIN: ICD-10-CM

## 2022-04-23 DIAGNOSIS — I95.9 HYPOTENSION: ICD-10-CM

## 2022-04-23 LAB
ANION GAP SERPL CALC-SCNC: 11 MMOL/L (ref 8–16)
BASOPHILS # BLD AUTO: 0.03 K/UL (ref 0–0.2)
BASOPHILS NFR BLD: 0.3 % (ref 0–1.9)
BUN SERPL-MCNC: 24 MG/DL (ref 8–23)
CALCIUM SERPL-MCNC: 9.3 MG/DL (ref 8.7–10.5)
CHLORIDE SERPL-SCNC: 107 MMOL/L (ref 95–110)
CO2 SERPL-SCNC: 23 MMOL/L (ref 23–29)
CREAT SERPL-MCNC: 0.8 MG/DL (ref 0.5–1.4)
D DIMER PPP IA.FEU-MCNC: 21.32 MG/L FEU
DIFFERENTIAL METHOD: ABNORMAL
EOSINOPHIL # BLD AUTO: 0.1 K/UL (ref 0–0.5)
EOSINOPHIL NFR BLD: 0.4 % (ref 0–8)
ERYTHROCYTE [DISTWIDTH] IN BLOOD BY AUTOMATED COUNT: 11.9 % (ref 11.5–14.5)
EST. GFR  (AFRICAN AMERICAN): >60 ML/MIN/1.73 M^2
EST. GFR  (NON AFRICAN AMERICAN): >60 ML/MIN/1.73 M^2
GLUCOSE SERPL-MCNC: 180 MG/DL (ref 70–110)
HCT VFR BLD AUTO: 37 % (ref 37–48.5)
HGB BLD-MCNC: 12.9 G/DL (ref 12–16)
IMM GRANULOCYTES # BLD AUTO: 0.04 K/UL (ref 0–0.04)
IMM GRANULOCYTES NFR BLD AUTO: 0.3 % (ref 0–0.5)
LYMPHOCYTES # BLD AUTO: 0.9 K/UL (ref 1–4.8)
LYMPHOCYTES NFR BLD: 7.2 % (ref 18–48)
MCH RBC QN AUTO: 30.7 PG (ref 27–31)
MCHC RBC AUTO-ENTMCNC: 34.9 G/DL (ref 32–36)
MCV RBC AUTO: 88 FL (ref 82–98)
MONOCYTES # BLD AUTO: 0.7 K/UL (ref 0.3–1)
MONOCYTES NFR BLD: 6.2 % (ref 4–15)
NEUTROPHILS # BLD AUTO: 10.1 K/UL (ref 1.8–7.7)
NEUTROPHILS NFR BLD: 85.6 % (ref 38–73)
NRBC BLD-RTO: 0 /100 WBC
PLATELET # BLD AUTO: 225 K/UL (ref 150–450)
PMV BLD AUTO: 9.3 FL (ref 9.2–12.9)
POCT GLUCOSE: 101 MG/DL (ref 70–110)
POTASSIUM SERPL-SCNC: 4.1 MMOL/L (ref 3.5–5.1)
RBC # BLD AUTO: 4.2 M/UL (ref 4–5.4)
SODIUM SERPL-SCNC: 141 MMOL/L (ref 136–145)
WBC # BLD AUTO: 11.8 K/UL (ref 3.9–12.7)

## 2022-04-23 PROCEDURE — 80048 BASIC METABOLIC PNL TOTAL CA: CPT | Performed by: PHYSICIAN ASSISTANT

## 2022-04-23 PROCEDURE — 99285 EMERGENCY DEPT VISIT HI MDM: CPT | Mod: 25

## 2022-04-23 PROCEDURE — 93010 ELECTROCARDIOGRAM REPORT: CPT | Mod: ,,, | Performed by: INTERNAL MEDICINE

## 2022-04-23 PROCEDURE — 25500020 PHARM REV CODE 255: Performed by: NURSE PRACTITIONER

## 2022-04-23 PROCEDURE — 63600175 PHARM REV CODE 636 W HCPCS: Performed by: NURSE PRACTITIONER

## 2022-04-23 PROCEDURE — 96372 THER/PROPH/DIAG INJ SC/IM: CPT | Mod: 59 | Performed by: NURSE PRACTITIONER

## 2022-04-23 PROCEDURE — 96374 THER/PROPH/DIAG INJ IV PUSH: CPT

## 2022-04-23 PROCEDURE — G0378 HOSPITAL OBSERVATION PER HR: HCPCS

## 2022-04-23 PROCEDURE — 93005 ELECTROCARDIOGRAM TRACING: CPT

## 2022-04-23 PROCEDURE — 85379 FIBRIN DEGRADATION QUANT: CPT | Performed by: EMERGENCY MEDICINE

## 2022-04-23 PROCEDURE — 25000003 PHARM REV CODE 250: Performed by: PHYSICIAN ASSISTANT

## 2022-04-23 PROCEDURE — 93010 EKG 12-LEAD: ICD-10-PCS | Mod: ,,, | Performed by: INTERNAL MEDICINE

## 2022-04-23 PROCEDURE — 25000003 PHARM REV CODE 250: Performed by: NURSE PRACTITIONER

## 2022-04-23 PROCEDURE — 85025 COMPLETE CBC W/AUTO DIFF WBC: CPT | Performed by: PHYSICIAN ASSISTANT

## 2022-04-23 RX ORDER — ACETAMINOPHEN 325 MG/1
650 TABLET ORAL
Status: COMPLETED | OUTPATIENT
Start: 2022-04-23 | End: 2022-04-23

## 2022-04-23 RX ORDER — HYDROCODONE BITARTRATE AND ACETAMINOPHEN 5; 325 MG/1; MG/1
1 TABLET ORAL
Status: COMPLETED | OUTPATIENT
Start: 2022-04-23 | End: 2022-04-24

## 2022-04-23 RX ORDER — MORPHINE SULFATE 4 MG/ML
4 INJECTION, SOLUTION INTRAMUSCULAR; INTRAVENOUS
Status: COMPLETED | OUTPATIENT
Start: 2022-04-23 | End: 2022-04-23

## 2022-04-23 RX ORDER — ACETAMINOPHEN 500 MG
1000 TABLET ORAL EVERY 8 HOURS PRN
Status: DISCONTINUED | OUTPATIENT
Start: 2022-04-23 | End: 2022-04-24 | Stop reason: HOSPADM

## 2022-04-23 RX ORDER — DENOSUMAB 60 MG/ML
60 INJECTION SUBCUTANEOUS
COMMUNITY

## 2022-04-23 RX ORDER — SODIUM CHLORIDE 0.9 % (FLUSH) 0.9 %
10 SYRINGE (ML) INJECTION
Status: DISCONTINUED | OUTPATIENT
Start: 2022-04-23 | End: 2022-04-24 | Stop reason: HOSPADM

## 2022-04-23 RX ORDER — OXYCODONE HYDROCHLORIDE 5 MG/1
5 TABLET ORAL EVERY 4 HOURS PRN
Status: DISCONTINUED | OUTPATIENT
Start: 2022-04-23 | End: 2022-04-24 | Stop reason: HOSPADM

## 2022-04-23 RX ORDER — ONDANSETRON 2 MG/ML
4 INJECTION INTRAMUSCULAR; INTRAVENOUS
Status: COMPLETED | OUTPATIENT
Start: 2022-04-23 | End: 2022-04-23

## 2022-04-23 RX ORDER — KETOROLAC TROMETHAMINE 30 MG/ML
15 INJECTION, SOLUTION INTRAMUSCULAR; INTRAVENOUS EVERY 6 HOURS PRN
Status: DISCONTINUED | OUTPATIENT
Start: 2022-04-23 | End: 2022-04-24 | Stop reason: HOSPADM

## 2022-04-23 RX ORDER — TALC
6 POWDER (GRAM) TOPICAL NIGHTLY PRN
Status: DISCONTINUED | OUTPATIENT
Start: 2022-04-23 | End: 2022-04-24 | Stop reason: HOSPADM

## 2022-04-23 RX ADMIN — ACETAMINOPHEN 650 MG: 325 TABLET ORAL at 03:04

## 2022-04-23 RX ADMIN — OXYCODONE 5 MG: 5 TABLET ORAL at 11:04

## 2022-04-23 RX ADMIN — MORPHINE SULFATE 4 MG: 4 INJECTION, SOLUTION INTRAMUSCULAR; INTRAVENOUS at 06:04

## 2022-04-23 RX ADMIN — IOHEXOL 75 ML: 350 INJECTION, SOLUTION INTRAVENOUS at 10:04

## 2022-04-23 RX ADMIN — ONDANSETRON 4 MG: 2 INJECTION INTRAMUSCULAR; INTRAVENOUS at 06:04

## 2022-04-23 NOTE — Clinical Note
Diagnosis: Closed fracture of proximal end of left tibia, unspecified fracture morphology, initial encounter [[id number]]   Future Attending Provider: SINTIA ARMENDARIZ [[id number]]   Is the patient being sent to ED Observation?: Yes   Admitting Provider:: SINTIA ARMENDARIZ [[id number]]   Special Needs:: No Special Needs [1]
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
## 2022-04-23 NOTE — ED NOTES
LOC: The patient is awake, alert, and oriented to self, place, time, and situation. Pt is calm and cooperative. Affect is appropriate.  Speech is appropriate and clear.     APPEARANCE: Patient sitting in wheelchair; appears uncomfortable but in no acute distress.  Patient is clean and well groomed.    SKIN: The skin is warm and dry; color consistent with ethnicity.  Patient has normal skin turgor and moist mucus membranes.  Skin intact; no breakdown or bruising noted.     MUSCULOSKELETAL: Patient moving bilateral upper and right lower extremities without difficulty but has limited mobility and weight-bearing to the LLE due to pain; denies pain in the back.  Denies weakness.     RESPIRATORY: Airway is open and patent. Respirations spontaneous, even, easy, and non-labored.  Patient has a normal effort and rate.  No accessory muscle use noted. Denies cough.     CARDIAC:  Normal rate noted.  No peripheral edema noted. No complaints of chest pain.      ABDOMEN: Soft and non tender to palpation.  No distention noted. Pt denies abdominal pain; denies nausea, vomiting, diarrhea, or constipation.    NEUROLOGIC: Eyes open spontaneously.  Behavior appropriate to situation.  Follows commands; facial expression symmetrical.  Purposeful motor response noted; normal sensation in all extremities. Pt denies headache; denies lightheadedness or dizziness; denies visual disturbances; denies loss of balance; denies unilateral weakness.

## 2022-04-23 NOTE — ED PROVIDER NOTES
Encounter Date: 4/23/2022       History     Chief Complaint   Patient presents with    Leg Pain     Pt c/o left lateral lower leg pain. States her and her  were at the [facility] when her  accidentally walked into a drain gutter causing him to fall forward and on top of the patient. Her left leg bent behind her and now she cannot bear weight on her left leg.     Chief complaint:  Fall    67-year-old female presents for evaluation of left lower extremity pain after a mechanical fall.  She reports that she fell awkwardly on to her left leg and now has left lateral knee and lower extremity pain.  She reports significant decreased range of motion secondary to pain.  Reports difficulty with weight-bearing and ambulation.  Swelling is present.    This is the extent of patient's complaints for this ER encounter.     The history is provided by the patient.     Review of patient's allergies indicates:  No Known Allergies  Past Medical History:   Diagnosis Date    History of colon polyps     Hyperplastic    Ben Dmitry jaw-winking syndrome     No known health problems     Osteoporosis      Past Surgical History:   Procedure Laterality Date    APPENDECTOMY  age 10    COLONOSCOPY N/A 9/26/2019    Procedure: COLONOSCOPY/suprep;  Surgeon: Santo Hoyt MD;  Location: [facility];  Service: Endoscopy;  Laterality: N/A;    TOTAL ABDOMINAL HYSTERECTOMY  age 35    w/ USO     Family History   Problem Relation Age of Onset    Stroke Mother     Stroke Father     Melanoma Neg Hx      Social History     Tobacco Use    Smoking status: Never Smoker    Smokeless tobacco: Never Used   Substance Use Topics    Alcohol use: Yes     Alcohol/week: 2.0 standard drinks     Types: 1 Glasses of wine, 1 Cans of beer per week     Comment: occasionally    Drug use: No     Review of Systems   Constitutional: Negative for fever.   HENT: Negative for congestion, rhinorrhea and sore throat.    Respiratory: Negative for  cough and shortness of breath.    Cardiovascular: Negative for chest pain.   Gastrointestinal: Negative for abdominal pain.   Genitourinary: Negative for difficulty urinating.   Musculoskeletal: Positive for arthralgias. Negative for back pain, myalgias and neck pain.   Skin: Negative for rash and wound.   Neurological: Negative for weakness and headaches.   Psychiatric/Behavioral: Negative.        Physical Exam     Initial Vitals [04/23/22 1413]   BP Pulse Resp Temp SpO2   (!) 144/68 69 16 99.2 °F (37.3 °C) 98 %      MAP       --         Physical Exam    Vitals reviewed.  Constitutional: No distress.   HENT:   Head: Normocephalic and atraumatic.   Nose: Nose normal.   Mouth/Throat: Oropharynx is clear and moist and mucous membranes are normal.   Eyes: Conjunctivae, EOM and lids are normal. Right pupil is round. Left pupil is round. Pupils are equal.   Cardiovascular: Normal rate, regular rhythm and normal heart sounds.   Pulmonary/Chest: Effort normal and breath sounds normal. No respiratory distress.   Musculoskeletal:      Left knee: Swelling present. No deformity, erythema or ecchymosis. Decreased range of motion. Tenderness present over the lateral joint line. Normal pulse.      Left lower leg: Swelling, tenderness and bony tenderness present. No deformity or lacerations.     Neurological: She is alert and oriented to person, place, and time. She has normal strength.   Skin: Skin is warm and dry. No rash noted.   Psychiatric: She has a normal mood and affect. Her speech is normal and behavior is normal.         ED Course   Procedures  Labs Reviewed   CBC W/ AUTO DIFFERENTIAL - Abnormal; Notable for the following components:       Result Value    Gran # (ANC) 10.1 (*)     Lymph # 0.9 (*)     Gran % 85.6 (*)     Lymph % 7.2 (*)     All other components within normal limits   BASIC METABOLIC PANEL - Abnormal; Notable for the following components:    Glucose 180 (*)     BUN 24 (*)     All other components within  normal limits   D DIMER, QUANTITATIVE - Abnormal; Notable for the following components:    D-Dimer 21.32 (*)     All other components within normal limits          Imaging Results          X-Ray Chest 1 View (Final result)  Result time 04/23/22 21:18:01    Final result by Keila De Jesus MD (04/23/22 21:18:01)                 Impression:      No acute cardiopulmonary process identified.      Electronically signed by: Keila De Jesus MD  Date:    04/23/2022  Time:    21:18             Narrative:    EXAMINATION:  XR CHEST 1 VIEW    CLINICAL HISTORY:  Hypotension, unspecified    TECHNIQUE:  Single frontal view of the chest was performed.    COMPARISON:  None    FINDINGS:  Cardiac silhouette is normal in size.  Lungs are symmetrically expanded.  No evidence of focal consolidative process, pneumothorax, or significant pleural effusion.  No acute osseous abnormality identified.                               CT Knee Without Contrast Left (Final result)  Result time 04/23/22 18:29:14    Final result by Keila De Jesus MD (04/23/22 18:29:14)                 Impression:      Acute proximal tibia and fibular fractures, as discussed above.      Electronically signed by: Keila De Jesus MD  Date:    04/23/2022  Time:    18:29             Narrative:    EXAMINATION:  CT KNEE WITHOUT CONTRAST LEFT    CLINICAL HISTORY:  Fracture, knee;Fracture identified on x-ray--rule out tibial plateau;    TECHNIQUE:  CT of the left knee was obtained without the use of IV contrast.  Sagittal and coronal reformats were obtained.    COMPARISON:  Left knee radiographs from the same date.    FINDINGS:  Acute mild displaced impacted proximal tibial fracture is seen.  There is obliquely oriented cleavage plane involving the lateral aspect of the proximal tibia metaphyseal region.  Fracture plane extends superiorly to the articular surface of the medial tibial plateau just to the medial aspect of the medial tibial spine.  Acute displaced fracture is  also seen of the fibular head and neck with mild comminution.  No evidence of distal femur fracture or patellar fracture.  Suprapatellar joint effusion is seen with lipohemarthrosis.                                X-Ray Tibia Fibula 2 View Left (Final result)  Result time 04/23/22 16:48:33    Final result by Lawson Mortensen MD (04/23/22 16:48:33)                 Impression:      Acute fracture of the proximal fibular metaphysis with no significant displacement.  Follow-up recommended.    This report was flagged in Epic as abnormal.      Electronically signed by: Lawson Mortensen  Date:    04/23/2022  Time:    16:48             Narrative:    EXAMINATION:  XR TIBIA FIBULA 2 VIEW LEFT    CLINICAL HISTORY:  Pain in leg, unspecified    TECHNIQUE:  AP and lateral views of the left tibia and fibula were performed.    COMPARISON:  None.    FINDINGS:  There is acute fracture of the proximal fibular metaphysis.  No significant displacement.  No fractures are identified in the mid and distal tibia or fibula.                                X-Ray Knee 1 or 2 View Left (Final result)  Result time 04/23/22 16:44:36    Final result by Luis Carlos Daily MD (04/23/22 16:44:36)                 Impression:      Mildly displaced fracture of the proximal left fibula metadiaphysis.    This report was flagged in Epic as abnormal.      Electronically signed by: Luis Carlos Daily MD  Date:    04/23/2022  Time:    16:44             Narrative:    EXAMINATION:  XR KNEE 1 OR 2 VIEW LEFT    CLINICAL HISTORY:  Pain in unspecified knee    TECHNIQUE:  Left knee, two views    COMPARISON:  None    FINDINGS:  Mildly displaced fracture of the proximal left fibula metadiaphysis.    Moderate volume lipohemarthrosis.                                 Medications   HYDROcodone-acetaminophen 5-325 mg per tablet 1 tablet (1 tablet Oral Not Given 4/23/22 2045)   acetaminophen tablet 650 mg (650 mg Oral Given 4/23/22 1532)   morphine injection 4 mg (4 mg Intramuscular  Given 4/23/22 1851)   ondansetron injection 4 mg (4 mg Intramuscular Given 4/23/22 1850)     Medical Decision Making:   Initial Assessment:   67-year-old female presents for evaluation of left lower leg pain after a fall.  Differential Diagnosis:   Fracture, strain, sprain, dislocation, contusion  Clinical Tests:   Radiological Study: Ordered and Reviewed  ED Management:  X-ray significant for a mildly displaced fracture of the proximal left fibula.  CT of the knee ordered for further evaluation.             ED Course as of 04/23/22 2202   Sat Apr 23, 2022 1951 CT findings:  Acute mild displaced impacted proximal tibial fracture is seen.  Fracture plane extends superiorly to the articular surface of the medial tibial plateau just to the medial aspect of the medial tibial spine.  Acute displaced fracture is also seen of the fibular head and neck with mild comminution.     CT findings discussed with ortho Matt, who felt patient could be seen per ortho in the outpatient setting.  Recommends knee immobilizer and crutches for nonweightbearing.    Increase in swelling is noted on my examination without circumferential swelling or signs of compartment syndrome.  Patient was given 4 mg of morphine IM for pain.  She developed hypotension likely secondary to pain medication.  She was in a wheelchair at that time and was moved to a bed where her blood pressure normalized soon after.    Patient will be placed in EDOU for further observation, monitoring for compartment syndrome, and pain management.  Discussed with Dr. Leon and DANNIELLE Esquivel.  [EW]   2005 Physician Attestation Statement: I have reviewed this case with my non-physician provider. I have provided a face to face evaluation of this patient at the request of my  non-physician provider. The patient's condition warranted physician involvement. The treatment regimen was reviewed by me.     Management decisions for this encounter made during COVID-19 public health  emergency. Available resources, standards for appropriate emergency department evaluation, and admission vs. discharge standards have necessarily shifted and remain dynamic.   Patient is a 67-year-old female presented with left knee pain after mechanical trip and fall.  No associated focal deficits.  On exam patient had left anterior knee contusion and mild abrasion, decreased range of motion due to pain, neurovascularly intact distally, compartments soft.  Initial differential included fracture, dislocation.  I independently interpreted and reviewed patient's initial plain films notable for proximal tibia fracture.  This raised concern for tibial plateau fracture which was demonstrated on subsequent CT.   Orthopedics was consulted.  They did not feel patient required admission, recommended knee immobilizer.  As patient was reassessed prior to expected discharge, she had an episode of hypotension, diaphoresis, near-syncope.  Episode was transient. Will place into observation, obatin screening labs and ddimer (PE unlikely but can be associated with tibial fractures).  [RC]   2117 D-Dimer(!): 21.32  EKG:  Normal sinus rhythm.  Heart rate 70. No STEMI.  PE study ordered. [EW]      ED Course User Index  [EW] Katherine Wang NP  [RC] Dani Leon MD             Clinical Impression:   Final diagnoses:  [M25.569] Knee pain  [W19.XXXA] Fall  [M79.606] Leg pain  [S82.102A] Closed fracture of proximal end of left tibia, unspecified fracture morphology, initial encounter (Primary)  [S82.832A] Closed fracture of proximal end of left fibula, unspecified fracture morphology, initial encounter  [I95.9] Hypotension  [I95.9] Hypotension          ED Disposition Condition    Observation               Katherine Wang NP  04/23/22 1955       Katherine Wang NP  04/23/22 2202

## 2022-04-24 VITALS
WEIGHT: 130 LBS | DIASTOLIC BLOOD PRESSURE: 59 MMHG | HEIGHT: 65 IN | RESPIRATION RATE: 18 BRPM | TEMPERATURE: 99 F | OXYGEN SATURATION: 97 % | BODY MASS INDEX: 21.66 KG/M2 | HEART RATE: 64 BPM | SYSTOLIC BLOOD PRESSURE: 107 MMHG

## 2022-04-24 LAB
BASOPHILS # BLD AUTO: 0.02 K/UL (ref 0–0.2)
BASOPHILS NFR BLD: 0.3 % (ref 0–1.9)
DIFFERENTIAL METHOD: ABNORMAL
EOSINOPHIL # BLD AUTO: 0.1 K/UL (ref 0–0.5)
EOSINOPHIL NFR BLD: 0.8 % (ref 0–8)
ERYTHROCYTE [DISTWIDTH] IN BLOOD BY AUTOMATED COUNT: 12 % (ref 11.5–14.5)
HCT VFR BLD AUTO: 33.1 % (ref 37–48.5)
HGB BLD-MCNC: 11.3 G/DL (ref 12–16)
IMM GRANULOCYTES # BLD AUTO: 0.02 K/UL (ref 0–0.04)
IMM GRANULOCYTES NFR BLD AUTO: 0.3 % (ref 0–0.5)
LYMPHOCYTES # BLD AUTO: 1.1 K/UL (ref 1–4.8)
LYMPHOCYTES NFR BLD: 15.4 % (ref 18–48)
MCH RBC QN AUTO: 30.1 PG (ref 27–31)
MCHC RBC AUTO-ENTMCNC: 34.1 G/DL (ref 32–36)
MCV RBC AUTO: 88 FL (ref 82–98)
MONOCYTES # BLD AUTO: 1 K/UL (ref 0.3–1)
MONOCYTES NFR BLD: 13.5 % (ref 4–15)
NEUTROPHILS # BLD AUTO: 5.2 K/UL (ref 1.8–7.7)
NEUTROPHILS NFR BLD: 69.7 % (ref 38–73)
NRBC BLD-RTO: 0 /100 WBC
PLATELET # BLD AUTO: 197 K/UL (ref 150–450)
PMV BLD AUTO: 9.5 FL (ref 9.2–12.9)
RBC # BLD AUTO: 3.75 M/UL (ref 4–5.4)
WBC # BLD AUTO: 7.42 K/UL (ref 3.9–12.7)

## 2022-04-24 PROCEDURE — 25000003 PHARM REV CODE 250: Performed by: NURSE PRACTITIONER

## 2022-04-24 PROCEDURE — G0378 HOSPITAL OBSERVATION PER HR: HCPCS

## 2022-04-24 PROCEDURE — 85025 COMPLETE CBC W/AUTO DIFF WBC: CPT | Performed by: PHYSICIAN ASSISTANT

## 2022-04-24 PROCEDURE — 96374 THER/PROPH/DIAG INJ IV PUSH: CPT

## 2022-04-24 PROCEDURE — 63600175 PHARM REV CODE 636 W HCPCS: Performed by: PHYSICIAN ASSISTANT

## 2022-04-24 RX ORDER — HYDROCODONE BITARTRATE AND ACETAMINOPHEN 5; 325 MG/1; MG/1
1 TABLET ORAL EVERY 4 HOURS PRN
Qty: 15 TABLET | Refills: 0 | Status: ON HOLD | OUTPATIENT
Start: 2022-04-24 | End: 2022-04-29 | Stop reason: SDUPTHER

## 2022-04-24 RX ADMIN — KETOROLAC TROMETHAMINE 15 MG: 30 INJECTION, SOLUTION INTRAMUSCULAR at 12:04

## 2022-04-24 RX ADMIN — HYDROCODONE BITARTRATE AND ACETAMINOPHEN 1 TABLET: 5; 325 TABLET ORAL at 07:04

## 2022-04-24 NOTE — ED NOTES
"ROUNDING:  AAOx3. VSS. States feeling "better since the warm blanket and food." States L knee pain 7/10 "it's getting better." Pt eating a sandwich and drinking water. L knee immobilizer applied; l knee elevated with pillow. Denies any other discomfort. Spouse at BS.  POC updated. Bed locked in low position, side rails up x2 and call light within reach. Will continue to monitor.   "

## 2022-04-24 NOTE — H&P
ED Observation Unit  History and Physical      I assumed care of this patient from the Main ED at onset of observation time, 1100 on 04/24/2022.       History of Present Illness:    Patient is a 67-year-old female with osteoporosis and subclinical hypothyroidism who presents to the ED last nightwith left lower leg pain after mechanical fall.  She reports that she fell awkwardly on to her left leg and now has left lateral knee and lower extremity pain.  She reports significant decreased range of motion secondary to pain.  Reports difficulty with weight-bearing and ambulation.  Swelling is present.    Imaging revealed:  Acute mild displaced impacted proximal tibial fracture is seen.  There is obliquely oriented cleavage plane involving the lateral aspect of the proximal tibia metaphyseal region.  Fracture plane extends superiorly to the articular surface of the medial tibial plateau just to the medial aspect of the medial tibial spine.  Acute displaced fracture is also seen of the fibular head and neck with mild comminution.  No evidence of distal femur fracture or patellar fracture.  Suprapatellar joint effusion is seen with lipohemarthrosis    Ortho consulted from ED, reccommended outpatient f/u, knee immobilizer and cruthces  In the ED, swelling worsened.  She also had episode of hypotension after receiving IV morphine.  She was admitted for intractable pain and worsening swelling.     I reviewed the ED Provider Note dated 4/23/22 prior to my evaluation of this patient.  I reviewed all labs and imaging performed in the Main ED, prior to patient being placed in Observation.  PMHx   Past Medical History:   Diagnosis Date    History of colon polyps     Hyperplastic    Ben Dmitry jaw-winking syndrome     No known health problems     Osteoporosis       Past Surgical History:   Procedure Laterality Date    APPENDECTOMY  age 10    COLONOSCOPY N/A 9/26/2019    Procedure: COLONOSCOPY/suprep;  Surgeon: Santo MANCUSO  MD Evelina;  Location: [facility];  Service: Endoscopy;  Laterality: N/A;    TOTAL ABDOMINAL HYSTERECTOMY  age 35    w/ USO        Family Hx   Family History   Problem Relation Age of Onset    Stroke Mother     Stroke Father     Melanoma Neg Hx         Social Hx   Social History     Socioeconomic History    Marital status:      Spouse name: Butch    Number of children: 0   Tobacco Use    Smoking status: Never Smoker    Smokeless tobacco: Never Used   Substance and Sexual Activity    Alcohol use: Yes     Alcohol/week: 2.0 standard drinks     Types: 1 Glasses of wine, 1 Cans of beer per week     Comment: occasionally    Drug use: No    Sexual activity: Not Currently   Social History Narrative    She is not satisfied with weight.    Rates diet as good.    She does drink at least 1/2 gallon water daily.    She drinks 2 coffee/tea/caffeine-containing soft drinks daily.    Total sleep time at night is 6-8 hours.    She does wear seat belts.    Hobbies include cooking.                Vital Signs   Vitals:    04/23/22 1930 04/23/22 1940 04/23/22 2000 04/23/22 2100   BP: (!) 131/52 (S) (!) 137/54 (!) 125/59 (!) 107/54   BP Location: Left arm      Patient Position: Lying (S) Lying  Comment: Sitting up on stretcher in semi-fowlers.     Pulse: 69 76 70 70   Resp: 18   (!) 27   Temp:       TempSrc:       SpO2: 100% 100% 99% 100%   Weight:       Height:            Review of Systems  Review of Systems   Constitutional: Negative for fever and malaise/fatigue.   Eyes: Negative for blurred vision.   Respiratory: Negative for shortness of breath.    Cardiovascular: Positive for leg swelling. Negative for chest pain.   Gastrointestinal: Negative for nausea and vomiting.   Musculoskeletal: Positive for joint pain and myalgias.   Skin: Negative for rash.   Neurological: Negative for dizziness, weakness and headaches.       Physical Exam  Physical Exam  Vitals and nursing note reviewed.   Constitutional:       General:  She is not in acute distress.     Appearance: She is not ill-appearing or diaphoretic.   HENT:      Head: Normocephalic and atraumatic.   Eyes:      Extraocular Movements: Extraocular movements intact.      Conjunctiva/sclera: Conjunctivae normal.   Cardiovascular:      Rate and Rhythm: Regular rhythm.      Pulses: Normal pulses.   Pulmonary:      Effort: Pulmonary effort is normal. No respiratory distress.   Musculoskeletal:      Left knee: Swelling (mild ,diffuse.  Compartments soft and compressible) and effusion present. No deformity, erythema or ecchymosis. Decreased range of motion. Normal pulse.   Skin:     General: Skin is warm and dry.   Neurological:      Mental Status: She is alert and oriented to person, place, and time.   Psychiatric:         Mood and Affect: Mood normal.         Judgment: Judgment normal.         Medications:   Scheduled Meds:   HYDROcodone-acetaminophen  1 tablet Oral ED 1 Time     Continuous Infusions:  PRN Meds:.      Assessment/Plan:    1)  closed fracture of proximal tibia and fibula on left side:  Plan:  Monitor swelling and signs of compartment syndrome.  P.r.n. pain control.      Case was discussed with the ED provider, Katherine Wang NP

## 2022-04-24 NOTE — ED NOTES
Pt dc from ED at this time, no signs of acute distress noted. Pt provided DC and medication instructions and verbalizes understanding. Pt pushed out of ED in wheelchair at this time.

## 2022-04-24 NOTE — ED NOTES
CRUTCHES:    Pt's spouse retrieved pt's personal crutches from his vehicle. Pt's personal crutches at .

## 2022-04-24 NOTE — DISCHARGE SUMMARY
ED Observation Unit  Discharge Summary        History of Present Illness:    Patient is a 67-year-old female with osteoporosis and subclinical hypothyroidism who presents to the ED last nightwith left lower leg pain after mechanical fall.  She reports that she fell awkwardly on to her left leg and now has left lateral knee and lower extremity pain.  She reports significant decreased range of motion secondary to pain.  Reports difficulty with weight-bearing and ambulation.  Swelling is present.     Imaging revealed:  Acute mild displaced impacted proximal tibial fracture is seen.  There is obliquely oriented cleavage plane involving the lateral aspect of the proximal tibia metaphyseal region.  Fracture plane extends superiorly to the articular surface of the medial tibial plateau just to the medial aspect of the medial tibial spine.  Acute displaced fracture is also seen of the fibular head and neck with mild comminution.  No evidence of distal femur fracture or patellar fracture.  Suprapatellar joint effusion is seen with lipohemarthrosis     Ortho consulted from ED, reccommended outpatient f/u, knee immobilizer and cruthces  In the ED, swelling worsened.  She also had episode of hypotension after receiving IV morphine.  She was admitted for intractable pain and worsening swelling.    Observation Course:    No acute events. Patient' pain well controled. Swelling and ROM improved. No signs of compartment syndrome or septic joint. Ambulatory referral placed to Ortho.  No further episodes of significant hypotension or near syncope.     Consultants:    none    Final Diagnosis:  1. Closed fracture of proximal end of left tibia, unspecified fracture morphology, initial encounter    2. Knee pain    3. Leg pain    4. Closed fracture of proximal end of left fibula, unspecified fracture morphology, initial encounter    5. Hypotension        Discharge Condition: Stable    Disposition: Home or Self Care     Time spent on the  discharge of the patient including review of hospital course with the patient. reviewing discharge medications and arranging follow-up care 35 minutes.  Patient was seen and examined on the date of discharge and determined to be suitable for discharge.    Follow Up:  Future Appointments   Date Time Provider Department Center   10/21/2022  1:45 PM INJECTION PHUONG VALLADARES [facility]

## 2022-04-24 NOTE — ED NOTES
"LIGHTHEADEDNESS RESOLVED: Lying on stretcher wthi HOB at semi-fowlers. AAOx3. Speech is clear and coherent. Skin is warm and dry. Resp:18 even and unlabored. Pt given juice and jimmie crackers "I'm feeling better." Denies dizziness, lightheadedness, cp, sob, n/v or any other discomfort at this time. NADN. Spouse at BS. Bed locked in low position, side rails up x2 and call light within reach. Will continue to monitor.   "

## 2022-04-28 ENCOUNTER — HOSPITAL ENCOUNTER (OUTPATIENT)
Dept: RADIOLOGY | Facility: HOSPITAL | Age: 67
Discharge: HOME | End: 2022-04-28
Attending: NURSE PRACTITIONER
Payer: MEDICARE

## 2022-04-28 ENCOUNTER — TELEPHONE (OUTPATIENT)
Dept: ORTHOPEDICS | Facility: CLINIC | Age: 67
End: 2022-04-28

## 2022-04-28 ENCOUNTER — OFFICE VISIT (OUTPATIENT)
Dept: ORTHOPEDICS | Facility: CLINIC | Age: 67
End: 2022-04-28
Payer: MEDICARE

## 2022-04-28 VITALS — HEIGHT: 65 IN | BODY MASS INDEX: 21.67 KG/M2 | WEIGHT: 130.06 LBS

## 2022-04-28 DIAGNOSIS — M89.8X6 PAIN IN LEFT TIBIA: ICD-10-CM

## 2022-04-28 DIAGNOSIS — M25.562 ACUTE PAIN OF LEFT KNEE: ICD-10-CM

## 2022-04-28 DIAGNOSIS — S82.832A CLOSED FRACTURE OF PROXIMAL END OF LEFT FIBULA, UNSPECIFIED FRACTURE MORPHOLOGY, INITIAL ENCOUNTER: ICD-10-CM

## 2022-04-28 DIAGNOSIS — S82.102A CLOSED FRACTURE OF PROXIMAL END OF LEFT TIBIA, UNSPECIFIED FRACTURE MORPHOLOGY, INITIAL ENCOUNTER: Primary | ICD-10-CM

## 2022-04-28 DIAGNOSIS — M25.562 ACUTE PAIN OF LEFT KNEE: Primary | ICD-10-CM

## 2022-04-28 PROCEDURE — 99999 PR PBB SHADOW E&M-EST. PATIENT-LVL IV: CPT | Mod: PBBFAC,,, | Performed by: NURSE PRACTITIONER

## 2022-04-28 PROCEDURE — 1160F RVW MEDS BY RX/DR IN RCRD: CPT | Mod: CPTII,S$GLB,, | Performed by: NURSE PRACTITIONER

## 2022-04-28 PROCEDURE — 3008F BODY MASS INDEX DOCD: CPT | Mod: CPTII,S$GLB,, | Performed by: NURSE PRACTITIONER

## 2022-04-28 PROCEDURE — 1160F PR REVIEW ALL MEDS BY PRESCRIBER/CLIN PHARMACIST DOCUMENTED: ICD-10-PCS | Mod: CPTII,S$GLB,, | Performed by: NURSE PRACTITIONER

## 2022-04-28 PROCEDURE — 3008F PR BODY MASS INDEX (BMI) DOCUMENTED: ICD-10-PCS | Mod: CPTII,S$GLB,, | Performed by: NURSE PRACTITIONER

## 2022-04-28 PROCEDURE — 1101F PR PT FALLS ASSESS DOC 0-1 FALLS W/OUT INJ PAST YR: ICD-10-PCS | Mod: CPTII,S$GLB,, | Performed by: NURSE PRACTITIONER

## 2022-04-28 PROCEDURE — 99999 PR PBB SHADOW E&M-EST. PATIENT-LVL IV: ICD-10-PCS | Mod: PBBFAC,,, | Performed by: NURSE PRACTITIONER

## 2022-04-28 PROCEDURE — 1159F PR MEDICATION LIST DOCUMENTED IN MEDICAL RECORD: ICD-10-PCS | Mod: CPTII,S$GLB,, | Performed by: NURSE PRACTITIONER

## 2022-04-28 PROCEDURE — 3288F FALL RISK ASSESSMENT DOCD: CPT | Mod: CPTII,S$GLB,, | Performed by: NURSE PRACTITIONER

## 2022-04-28 PROCEDURE — 3288F PR FALLS RISK ASSESSMENT DOCUMENTED: ICD-10-PCS | Mod: CPTII,S$GLB,, | Performed by: NURSE PRACTITIONER

## 2022-04-28 PROCEDURE — 99205 PR OFFICE/OUTPT VISIT, NEW, LEVL V, 60-74 MIN: ICD-10-PCS | Mod: S$GLB,,, | Performed by: NURSE PRACTITIONER

## 2022-04-28 PROCEDURE — 1159F MED LIST DOCD IN RCRD: CPT | Mod: CPTII,S$GLB,, | Performed by: NURSE PRACTITIONER

## 2022-04-28 PROCEDURE — 1125F PR PAIN SEVERITY QUANTIFIED, PAIN PRESENT: ICD-10-PCS | Mod: CPTII,S$GLB,, | Performed by: NURSE PRACTITIONER

## 2022-04-28 PROCEDURE — 1125F AMNT PAIN NOTED PAIN PRSNT: CPT | Mod: CPTII,S$GLB,, | Performed by: NURSE PRACTITIONER

## 2022-04-28 PROCEDURE — 1101F PT FALLS ASSESS-DOCD LE1/YR: CPT | Mod: CPTII,S$GLB,, | Performed by: NURSE PRACTITIONER

## 2022-04-28 PROCEDURE — 99205 OFFICE O/P NEW HI 60 MIN: CPT | Mod: S$GLB,,, | Performed by: NURSE PRACTITIONER

## 2022-04-28 RX ORDER — CELECOXIB 200 MG/1
200 CAPSULE ORAL DAILY
Qty: 14 CAPSULE | Refills: 0 | Status: SHIPPED | OUTPATIENT
Start: 2022-04-28 | End: 2022-05-12

## 2022-04-28 RX ORDER — MUPIROCIN 20 MG/G
OINTMENT TOPICAL
Status: CANCELLED | OUTPATIENT
Start: 2022-04-28

## 2022-04-28 RX ORDER — METHOCARBAMOL 500 MG/1
500 TABLET, FILM COATED ORAL 4 TIMES DAILY
Qty: 40 TABLET | Refills: 0 | Status: SHIPPED | OUTPATIENT
Start: 2022-04-28 | End: 2022-05-08

## 2022-04-28 RX ORDER — GABAPENTIN 100 MG/1
100 CAPSULE ORAL NIGHTLY
Qty: 90 CAPSULE | Refills: 0 | Status: SHIPPED | OUTPATIENT
Start: 2022-04-28 | End: 2022-06-09

## 2022-04-28 NOTE — PROGRESS NOTES
"  SUBJECTIVE:     Chief Complaint & History of Present Illness:  Niya Durbin is a New 67 y.o. year old female patient here with a history of constant left knee pain which started 1 week ago.  She reports her  had stepped into a drain gutter which caused him to fall forward and on top of her [place].  She had pain in her left knee and pain with weight bearing.  She went to the ED at [facility] and x-ray of her left knee, left tib/fib, and CT of her left knee were obtained.  Findings showed a proximal fibula fracture and a tibial plateau fracture.  She was prescribed Norco 5/325 mg PRN and placed into a knee immobilizer and told to follow up with Orthopedics.    Currently, patient reports she has tried to limit weight bearing activities as she had pain at the left knee with weight bearing.  Denies lower leg numbness or tingling sensation.  She endorses skin abrasions secondary to her immobilizer.  She has been using the Norco PRN for pain but does not give complete relief and has taken Aleve in addition to the Norco which gave her better pain control.  She reports she has a PMH of Osteoporosis and takes a shot twice a year for this otherwise she has no other medical issues.    Principle Orthopedic Problem    ICD-10-CM ICD-9-CM   1. Closed fracture of proximal end of left tibia, unspecified fracture morphology, initial encounter  S82.102A 823.00   2. Closed fracture of proximal end of left fibula, unspecified fracture morphology, initial encounter  S82.832A 823.01       Date of injury 4/23/22    Lives Home at home with Spouse             Independent community ambulator, no gait aids   Works as a retired   Does not use tobacco   Does not have diabetes   Does not have a history of heart attack, stroke, blood clot, cancer + Osteoporosis  Estimated body mass index is 21.64 kg/m² as calculated from the following:    Height as of this encounter: 5' 5" (1.651 m).    Weight as of this " encounter: 59 kg (130 lb 1.1 oz).    Review of patient's allergies indicates:  No Known Allergies      Current Outpatient Medications   Medication Sig Dispense Refill    alendronate (FOSAMAX) 70 MG tablet Take 1 tablet (70 mg total) by mouth every 7 days. (Patient not taking: Reported on 4/20/2022) 4 tablet 11    denosumab (PROLIA) 60 mg/mL Syrg Inject 60 mg into the skin.      HYDROcodone-acetaminophen (NORCO) 5-325 mg per tablet Take 1 tablet by mouth every 4 (four) hours as needed for Pain. 15 tablet 0    triamcinolone acetonide 0.1% (KENALOG) 0.1 % cream Apply to affected areas of hands and legs BID prn rash. Do not use on face, underarms, or groin. 80 g 3     No current facility-administered medications for this visit.     Facility-Administered Medications Ordered in Other Visits   Medication Dose Route Frequency Provider Last Rate Last Admin    0.9%  NaCl infusion   Intravenous Continuous Santo Hoyt MD   Stopped at 09/26/19 1020    sodium chloride 0.9% flush 10 mL  10 mL Intravenous PRN Santo Hoyt MD           Past Medical History:   Diagnosis Date    History of colon polyps     Hyperplastic    Ben Dmitry jaw-winking syndrome     No known health problems     Osteoporosis        Past Surgical History:   Procedure Laterality Date    APPENDECTOMY  age 10    COLONOSCOPY N/A 9/26/2019    Procedure: COLONOSCOPY/suprep;  Surgeon: Santo Hoyt MD;  Location: [facility];  Service: Endoscopy;  Laterality: N/A;    TOTAL ABDOMINAL HYSTERECTOMY  age 35    w/ USO       Family History   Problem Relation Age of Onset    Stroke Mother     Stroke Father     Melanoma Neg Hx          Review of Systems:  ROS:  Constitutional: no fever or chills  Eyes: no visual changes  ENT: no nasal congestion or sore throat  Respiratory: no cough or shortness of breath  Cardiovascular: no chest pain or palpitations  Gastrointestinal: no nausea or vomiting, tolerating diet  Genitourinary: no hematuria or  "dysuria  Integument/Breast: no rash or pruritis  Hematologic/Lymphatic: no easy bruising or lymphadenopathy  Musculoskeletal: left tibia plateau fracture  Neurological: no seizures or tremors  Behavioral/Psych: no auditory or visual hallucinations  Endocrine: no heat or cold intolerance      OBJECTIVE:     PHYSICAL EXAM:  Vital Signs (Most Recent)  There were no vitals filed for this visit.     ,   Estimated body mass index is 21.63 kg/m² as calculated from the following:    Height as of 4/23/22: 5' 5" (1.651 m).    Weight as of 4/23/22: 59 kg (130 lb).   General Appearance: Well nourished, well developed, in no acute distress.  HENT: Normal cephalic, oropharynx pink and moist  Eyes: PERRLA bilaterally and EOM x 4  Respiratory: Even and unlabored  Skin: Warm and Dry. Bruising to left shin, skin abrasion to posterior LLE.  Psychiatric: AAO x 4, Mood & affect are normal.    left  Knee Exam:  Knee Range of Motion:limited by pain   Effusion:not significant  Condition of skin:intact, bruising and abrasion as stated above  Location of tenderness:Lateral joint line   Strength:limited by pain  ROM 0-20 degrees.      RADIOGRAPHS:  X-ray of the left knee, left tib fib, CT of the left knee dated April 23rd was personally reviewed by me.  Findings show a proximal fibular fracture on the left.  She also has a tibial plateau fracture with mild displacement on the left.  No other fracture seen.  All radiographs were personally reviewed by me.    ASSESSMENT/PLAN:       ICD-10-CM ICD-9-CM   1. Closed fracture of proximal end of left tibia, unspecified fracture morphology, initial encounter  S82.102A 823.00   2. Closed fracture of proximal end of left fibula, unspecified fracture morphology, initial encounter  S82.832A 823.01       Plan: We discussed with the patient at length all the different treatment options available for  the knee including anti-inflammatories, acetaminophen, rest, ice, knee strengthening exercise, occasional " cortisone injections for temporary relief, Viscosupplimentation injections, arthroscopic debridement osteotomy, and finally knee arthroplasty.     -Niya Durbin presents to clinic today with c/c left tibial plateau fracture secondary to  falling [place] of her [place]  -X-ray in CT as above.  Discussed finding and case with Dr. James.  He reviewed the x-rays as well as the CT scan.  He is recommending surgical fixation.  -Patient reports she lives in a [place] back house in bedroom is on the 2nd floor and is requesting a hospital bed temporarily in toe she is able to ambulate back up to her bedroom.  Additionally she would like a wheelchair with leg extension given her most recent fracture.  These will be ordered for her today and prescription will be given to her spouse to take to [facility].  -I advised the patient of pain management using the MModal approach.  I will prescribe Celebrex 200 mg daily, Robaxin 500 mg q.i.d., gabapentin 100 mg q.h.s..  She has a prescription for Norco 5/325 mg that she will continue to use for pain control.    Discussed treatment options with patient. Operative vs non-operative treatment discussed with patient. Recommend operative fixation. Patient has agreed to proceed with surgery.     - Rest ice and elevation to reduce swelling.   - Discussed proper and consistent elevation of affected extremities, above the level of the heart, while at rest, to help control/improve edema and decrease pain.  - NWB has been recommended for her left lower leg, no bending at the knee   - Pain medication: refill needed, yes.  Patient given Robaxin, Celebrex, and Neurontin today   - Discussed pain medication refill policy.  - Patient informed that pain is controlled using a multi modal approach with the goal to get off opioids as quick as possible.      - Consents signed, yes   - Lab, chest x-ray and EKG has not been ordered  "previously, results in chart  - No results found for: HGBA1C   - Estimated body mass index is 21.64 kg/m² as calculated from the following:    Height as of this encounter: 5' 5" (1.651 m).    Weight as of this encounter: 59 kg (130 lb 1.1 oz).  -     - Niya Durbin is not taking blood thinners       Pre, mt, and post operative procedure and expectations were discussed.  Questions were answered. The patient has been educated and is ready to proceed with surgery.  Approximately 30 minutes was spent discussing surgical outcomes, plans, procedures, pre, mt, and post operative expectations and care. The risks, benefits and alternatives to surgery were discussed with the patient at great length.  These include bleeding, infection, vessel/nerve damage, pain, numbness, tingling, complex regional pain syndrome, hardware/surgical failure, need for further surgery, malunion, nonunion, DVT, PE, arthritis and death. He also understands that the risks of surgery may be greater for some patients due to health or lifestyle issues, such as a current condition or a history of heart disease, obesity, clotting disorders, recurrent infections, smoking, sedentary lifestyle, or noncompliance with medications, therapy, or followup. The degree of the increased risk is hard to estimate with any degree of precision.  Patient states an understanding and wishes to proceed with surgery.   All questions were answered.  No guarantees were implied or stated.  Informed consent was obtained  The patient will contact us if the have any questions, concerns, and changes in their medical condition prior to surgery.    Patient has wheelchair and will bring with them the day of surgery. They have been counseled on proper use of the assistive device.       "

## 2022-04-28 NOTE — H&P
"  SUBJECTIVE:     Chief Complaint & History of Present Illness:  Niya Durbin is a New 67 y.o. year old female patient here with a history of constant left knee pain which started 1 week ago.  She reports her  had stepped into a drain gutter which caused him to fall forward and on top of her [place].  She had pain in her left knee and pain with weight bearing.  She went to the ED at [facility] and x-ray of her left knee, left tib/fib, and CT of her left knee were obtained.  Findings showed a proximal fibula fracture and a tibial plateau fracture.  She was prescribed Norco 5/325 mg PRN and placed into a knee immobilizer and told to follow up with Orthopedics.    Currently, patient reports she has tried to limit weight bearing activities as she had pain at the left knee with weight bearing.  Denies lower leg numbness or tingling sensation.  She endorses skin abrasions secondary to her immobilizer.  She has been using the Norco PRN for pain but does not give complete relief and has taken Aleve in addition to the Norco which gave her better pain control.  She reports she has a PMH of Osteoporosis and takes a shot twice a year for this otherwise she has no other medical issues.    Principle Orthopedic Problem    ICD-10-CM ICD-9-CM   1. Closed fracture of proximal end of left tibia, unspecified fracture morphology, initial encounter  S82.102A 823.00   2. Closed fracture of proximal end of left fibula, unspecified fracture morphology, initial encounter  S82.832A 823.01       Date of injury 4/23/22    Lives Home at home with Spouse             Independent community ambulator, no gait aids   Works as a retired   Does not use tobacco   Does not have diabetes   Does not have a history of heart attack, stroke, blood clot, cancer + Osteoporosis  Estimated body mass index is 21.64 kg/m² as calculated from the following:    Height as of this encounter: 5' 5" (1.651 m).    Weight as of this " encounter: 59 kg (130 lb 1.1 oz).    Review of patient's allergies indicates:  No Known Allergies      Current Outpatient Medications   Medication Sig Dispense Refill    alendronate (FOSAMAX) 70 MG tablet Take 1 tablet (70 mg total) by mouth every 7 days. (Patient not taking: Reported on 4/20/2022) 4 tablet 11    denosumab (PROLIA) 60 mg/mL Syrg Inject 60 mg into the skin.      HYDROcodone-acetaminophen (NORCO) 5-325 mg per tablet Take 1 tablet by mouth every 4 (four) hours as needed for Pain. 15 tablet 0    triamcinolone acetonide 0.1% (KENALOG) 0.1 % cream Apply to affected areas of hands and legs BID prn rash. Do not use on face, underarms, or groin. 80 g 3     No current facility-administered medications for this visit.     Facility-Administered Medications Ordered in Other Visits   Medication Dose Route Frequency Provider Last Rate Last Admin    0.9%  NaCl infusion   Intravenous Continuous Santo Hoyt MD   Stopped at 09/26/19 1020    sodium chloride 0.9% flush 10 mL  10 mL Intravenous PRN Santo Hoyt MD           Past Medical History:   Diagnosis Date    History of colon polyps     Hyperplastic    Ben Dmitry jaw-winking syndrome     No known health problems     Osteoporosis        Past Surgical History:   Procedure Laterality Date    APPENDECTOMY  age 10    COLONOSCOPY N/A 9/26/2019    Procedure: COLONOSCOPY/suprep;  Surgeon: Santo Hoyt MD;  Location[facility];  Service: Endoscopy;  Laterality: N/A;    TOTAL ABDOMINAL HYSTERECTOMY  age 35    w/ USO       Family History   Problem Relation Age of Onset    Stroke Mother     Stroke Father     Melanoma Neg Hx          Review of Systems:  ROS:  Constitutional: no fever or chills  Eyes: no visual changes  ENT: no nasal congestion or sore throat  Respiratory: no cough or shortness of breath  Cardiovascular: no chest pain or palpitations  Gastrointestinal: no nausea or vomiting, tolerating diet  Genitourinary: no hematuria or  "dysuria  Integument/Breast: no rash or pruritis  Hematologic/Lymphatic: no easy bruising or lymphadenopathy  Musculoskeletal: left tibia plateau fracture  Neurological: no seizures or tremors  Behavioral/Psych: no auditory or visual hallucinations  Endocrine: no heat or cold intolerance      OBJECTIVE:     PHYSICAL EXAM:  Vital Signs (Most Recent)  There were no vitals filed for this visit.     ,   Estimated body mass index is 21.63 kg/m² as calculated from the following:    Height as of 4/23/22: 5' 5" (1.651 m).    Weight as of 4/23/22: 59 kg (130 lb).   General Appearance: Well nourished, well developed, in no acute distress.  HENT: Normal cephalic, oropharynx pink and moist  Eyes: PERRLA bilaterally and EOM x 4  Respiratory: Even and unlabored  Skin: Warm and Dry. Bruising to left shin, skin abrasion to posterior LLE.  Psychiatric: AAO x 4, Mood & affect are normal.    left  Knee Exam:  Knee Range of Motion:limited by pain   Effusion:not significant  Condition of skin:intact, bruising and abrasion as stated above  Location of tenderness:Lateral joint line   Strength:limited by pain  ROM 0-20 degrees.      RADIOGRAPHS:  X-ray of the left knee, left tib fib, CT of the left knee dated April 23rd was personally reviewed by me.  Findings show a proximal fibular fracture on the left.  She also has a tibial plateau fracture with mild displacement on the left.  No other fracture seen.  All radiographs were personally reviewed by me.    ASSESSMENT/PLAN:       ICD-10-CM ICD-9-CM   1. Closed fracture of proximal end of left tibia, unspecified fracture morphology, initial encounter  S82.102A 823.00   2. Closed fracture of proximal end of left fibula, unspecified fracture morphology, initial encounter  S82.832A 823.01       Plan: We discussed with the patient at length all the different treatment options available for  the knee including anti-inflammatories, acetaminophen, rest, ice, knee strengthening exercise, occasional " cortisone injections for temporary relief, Viscosupplimentation injections, arthroscopic debridement osteotomy, and finally knee arthroplasty.     -Niya Durbin presents to clinic today with c/c left tibial plateau fracture secondary to  falling on top of her on [place]rd [place]  -X-ray in CT as above.  Discussed finding and case with Dr. James.  He reviewed the x-rays as well as the CT scan.  He is recommending surgical fixation.  -Patient reports she lives in a [place] back house in bedroom is on the 2nd floor and is requesting a hospital bed temporarily in toe she is able to ambulate back up to her bedroom.  Additionally she would like a wheelchair with leg extension given her most recent fracture.  These will be ordered for her today and prescription will be given to her spouse to take to [facility].  -I advised the patient of pain management using the MModal approach.  I will prescribe Celebrex 200 mg daily, Robaxin 500 mg q.i.d., gabapentin 100 mg q.h.s..  She has a prescription for Norco 5/325 mg that she will continue to use for pain control.    Discussed treatment options with patient. Operative vs non-operative treatment discussed with patient. Recommend operative fixation. Patient has agreed to proceed with surgery.     - Rest ice and elevation to reduce swelling.   - Discussed proper and consistent elevation of affected extremities, above the level of the heart, while at rest, to help control/improve edema and decrease pain.  - NWB has been recommended for her left lower leg, no bending at the knee   - Pain medication: refill needed, yes.  Patient given Robaxin, Celebrex, and Neurontin today   - Discussed pain medication refill policy.  - Patient informed that pain is controlled using a multi modal approach with the goal to get off opioids as quick as possible.      - Consents signed, yes   - Lab, chest x-ray and EKG has not been ordered  "previously, results in chart  - No results found for: HGBA1C   - Estimated body mass index is 21.64 kg/m² as calculated from the following:    Height as of this encounter: 5' 5" (1.651 m).    Weight as of this encounter: 59 kg (130 lb 1.1 oz).  -     - Niya Durbin is not taking blood thinners       Pre, mt, and post operative procedure and expectations were discussed.  Questions were answered. The patient has been educated and is ready to proceed with surgery.  Approximately 30 minutes was spent discussing surgical outcomes, plans, procedures, pre, mt, and post operative expectations and care. The risks, benefits and alternatives to surgery were discussed with the patient at great length.  These include bleeding, infection, vessel/nerve damage, pain, numbness, tingling, complex regional pain syndrome, hardware/surgical failure, need for further surgery, malunion, nonunion, DVT, PE, arthritis and death. He also understands that the risks of surgery may be greater for some patients due to health or lifestyle issues, such as a current condition or a history of heart disease, obesity, clotting disorders, recurrent infections, smoking, sedentary lifestyle, or noncompliance with medications, therapy, or followup. The degree of the increased risk is hard to estimate with any degree of precision.  Patient states an understanding and wishes to proceed with surgery.   All questions were answered.  No guarantees were implied or stated.  Informed consent was obtained  The patient will contact us if the have any questions, concerns, and changes in their medical condition prior to surgery.    Patient has wheelchair and will bring with them the day of surgery. They have been counseled on proper use of the assistive device.   "

## 2022-04-28 NOTE — TELEPHONE ENCOUNTER
Spoke with pt today in clinic in regards to surgery on tomorrow 4/29/2022, stated to pt no eating/drinking after midnight, arrival time 10am, and she is to report to 2nd floor [facility]. Pt verbalize understands.

## 2022-04-29 ENCOUNTER — HOSPITAL ENCOUNTER (OUTPATIENT)
Facility: HOSPITAL | Age: 67
Discharge: HOME | End: 2022-04-29
Attending: ORTHOPAEDIC SURGERY | Admitting: ORTHOPAEDIC SURGERY
Payer: MEDICARE

## 2022-04-29 ENCOUNTER — ANESTHESIA (OUTPATIENT)
Dept: SURGERY | Facility: HOSPITAL | Age: 67
End: 2022-04-29
Payer: MEDICARE

## 2022-04-29 ENCOUNTER — ANESTHESIA EVENT (OUTPATIENT)
Dept: SURGERY | Facility: HOSPITAL | Age: 67
End: 2022-04-29
Payer: MEDICARE

## 2022-04-29 VITALS
TEMPERATURE: 98 F | OXYGEN SATURATION: 97 % | BODY MASS INDEX: 21.67 KG/M2 | RESPIRATION RATE: 18 BRPM | HEIGHT: 65 IN | HEART RATE: 69 BPM | WEIGHT: 130.06 LBS | DIASTOLIC BLOOD PRESSURE: 64 MMHG | SYSTOLIC BLOOD PRESSURE: 109 MMHG

## 2022-04-29 DIAGNOSIS — S82.832A CLOSED FRACTURE OF PROXIMAL END OF LEFT FIBULA, UNSPECIFIED FRACTURE MORPHOLOGY, INITIAL ENCOUNTER: ICD-10-CM

## 2022-04-29 DIAGNOSIS — S82.102A CLOSED FRACTURE OF PROXIMAL END OF LEFT TIBIA, UNSPECIFIED FRACTURE MORPHOLOGY, INITIAL ENCOUNTER: Primary | ICD-10-CM

## 2022-04-29 PROCEDURE — 64445 NJX AA&/STRD SCIATIC NRV IMG: CPT | Mod: 59,LT,, | Performed by: ANESTHESIOLOGY

## 2022-04-29 PROCEDURE — D9220A PRA ANESTHESIA: Mod: CRNA,,, | Performed by: STUDENT IN AN ORGANIZED HEALTH CARE EDUCATION/TRAINING PROGRAM

## 2022-04-29 PROCEDURE — 25000003 PHARM REV CODE 250: Performed by: NURSE ANESTHETIST, CERTIFIED REGISTERED

## 2022-04-29 PROCEDURE — D9220A PRA ANESTHESIA: ICD-10-PCS | Mod: ANES,,, | Performed by: ANESTHESIOLOGY

## 2022-04-29 PROCEDURE — 25000003 PHARM REV CODE 250: Performed by: NURSE PRACTITIONER

## 2022-04-29 PROCEDURE — 76942 ECHO GUIDE FOR BIOPSY: CPT | Mod: 26,,, | Performed by: ANESTHESIOLOGY

## 2022-04-29 PROCEDURE — 63600175 PHARM REV CODE 636 W HCPCS: Performed by: NURSE PRACTITIONER

## 2022-04-29 PROCEDURE — 71000044 HC DOSC ROUTINE RECOVERY FIRST HOUR: Performed by: ORTHOPAEDIC SURGERY

## 2022-04-29 PROCEDURE — C1713 ANCHOR/SCREW BN/BN,TIS/BN: HCPCS | Performed by: ORTHOPAEDIC SURGERY

## 2022-04-29 PROCEDURE — 37000008 HC ANESTHESIA 1ST 15 MINUTES: Performed by: ORTHOPAEDIC SURGERY

## 2022-04-29 PROCEDURE — 76942 ECHO GUIDE FOR BIOPSY: CPT | Performed by: STUDENT IN AN ORGANIZED HEALTH CARE EDUCATION/TRAINING PROGRAM

## 2022-04-29 PROCEDURE — D9220A PRA ANESTHESIA: Mod: ANES,,, | Performed by: ANESTHESIOLOGY

## 2022-04-29 PROCEDURE — 36000710: Performed by: ORTHOPAEDIC SURGERY

## 2022-04-29 PROCEDURE — 63600175 PHARM REV CODE 636 W HCPCS: Performed by: NURSE ANESTHETIST, CERTIFIED REGISTERED

## 2022-04-29 PROCEDURE — 27201423 OPTIME MED/SURG SUP & DEVICES STERILE SUPPLY: Performed by: ORTHOPAEDIC SURGERY

## 2022-04-29 PROCEDURE — 27535 TREAT KNEE FRACTURE: CPT | Mod: LT,,, | Performed by: ORTHOPAEDIC SURGERY

## 2022-04-29 PROCEDURE — 76942 POPLITEAL SINGLE SHOT: ICD-10-PCS | Mod: 26,,, | Performed by: ANESTHESIOLOGY

## 2022-04-29 PROCEDURE — 25000003 PHARM REV CODE 250: Performed by: STUDENT IN AN ORGANIZED HEALTH CARE EDUCATION/TRAINING PROGRAM

## 2022-04-29 PROCEDURE — 25000003 PHARM REV CODE 250: Performed by: ANESTHESIOLOGY

## 2022-04-29 PROCEDURE — 36000711: Performed by: ORTHOPAEDIC SURGERY

## 2022-04-29 PROCEDURE — 64445 SAPHANEOUS SINGLE SHOT: ICD-10-PCS | Mod: 59,LT,, | Performed by: ANESTHESIOLOGY

## 2022-04-29 PROCEDURE — 64450 NJX AA&/STRD OTHER PN/BRANCH: CPT | Mod: 59,LT,, | Performed by: ANESTHESIOLOGY

## 2022-04-29 PROCEDURE — 64450 POPLITEAL SINGLE SHOT: ICD-10-PCS | Mod: 59,LT,, | Performed by: ANESTHESIOLOGY

## 2022-04-29 PROCEDURE — 27535 PR OPEN TX TIBIAL FRACTURE PROXIMAL UNICONDYLAR: ICD-10-PCS | Mod: LT,,, | Performed by: ORTHOPAEDIC SURGERY

## 2022-04-29 PROCEDURE — 63600175 PHARM REV CODE 636 W HCPCS: Performed by: STUDENT IN AN ORGANIZED HEALTH CARE EDUCATION/TRAINING PROGRAM

## 2022-04-29 PROCEDURE — 27800903 OPTIME MED/SURG SUP & DEVICES OTHER IMPLANTS: Performed by: ORTHOPAEDIC SURGERY

## 2022-04-29 PROCEDURE — D9220A PRA ANESTHESIA: ICD-10-PCS | Mod: CRNA,,, | Performed by: STUDENT IN AN ORGANIZED HEALTH CARE EDUCATION/TRAINING PROGRAM

## 2022-04-29 PROCEDURE — 71000015 HC POSTOP RECOV 1ST HR: Performed by: ORTHOPAEDIC SURGERY

## 2022-04-29 PROCEDURE — 37000009 HC ANESTHESIA EA ADD 15 MINS: Performed by: ORTHOPAEDIC SURGERY

## 2022-04-29 DEVICE — SCREW BONE AXSOS 4X26MM TI: Type: IMPLANTABLE DEVICE | Site: TIBIA | Status: FUNCTIONAL

## 2022-04-29 DEVICE — CHIPS CANCELLOUS 30CC: Type: IMPLANTABLE DEVICE | Site: TIBIA | Status: FUNCTIONAL

## 2022-04-29 DEVICE — IMPLANTABLE DEVICE: Type: IMPLANTABLE DEVICE | Site: TIBIA | Status: FUNCTIONAL

## 2022-04-29 DEVICE — SCREW BONE CORTICAL 3.5X28MM T: Type: IMPLANTABLE DEVICE | Site: TIBIA | Status: FUNCTIONAL

## 2022-04-29 DEVICE — SCREW BONE CORTICAL 3.5X24MM T: Type: IMPLANTABLE DEVICE | Site: TIBIA | Status: FUNCTIONAL

## 2022-04-29 DEVICE — SCREW BONE AXSOS 4X70MM TI: Type: IMPLANTABLE DEVICE | Site: TIBIA | Status: FUNCTIONAL

## 2022-04-29 RX ORDER — HYDROCODONE BITARTRATE AND ACETAMINOPHEN 5; 325 MG/1; MG/1
1 TABLET ORAL EVERY 4 HOURS PRN
Qty: 30 TABLET | Refills: 0 | Status: SHIPPED | OUTPATIENT
Start: 2022-04-29 | End: 2022-04-29 | Stop reason: SDUPTHER

## 2022-04-29 RX ORDER — FENTANYL CITRATE 50 UG/ML
INJECTION, SOLUTION INTRAMUSCULAR; INTRAVENOUS
Status: DISCONTINUED | OUTPATIENT
Start: 2022-04-29 | End: 2022-05-02

## 2022-04-29 RX ORDER — MUPIROCIN 20 MG/G
OINTMENT TOPICAL
Status: DISCONTINUED
Start: 2022-04-29 | End: 2022-04-29 | Stop reason: HOSPADM

## 2022-04-29 RX ORDER — SODIUM CHLORIDE 9 MG/ML
INJECTION, SOLUTION INTRAVENOUS CONTINUOUS
Status: DISCONTINUED | OUTPATIENT
Start: 2022-04-29 | End: 2022-04-29 | Stop reason: HOSPADM

## 2022-04-29 RX ORDER — HYDROCODONE BITARTRATE AND ACETAMINOPHEN 5; 325 MG/1; MG/1
1 TABLET ORAL EVERY 4 HOURS PRN
Qty: 30 TABLET | Refills: 0 | Status: SHIPPED | OUTPATIENT
Start: 2022-04-29 | End: 2022-05-24

## 2022-04-29 RX ORDER — ASPIRIN 81 MG/1
81 TABLET ORAL 2 TIMES DAILY
Qty: 56 TABLET | Refills: 0 | Status: SHIPPED | OUTPATIENT
Start: 2022-04-29 | End: 2022-05-13 | Stop reason: ALTCHOICE

## 2022-04-29 RX ORDER — BUPIVACAINE HYDROCHLORIDE 5 MG/ML
INJECTION, SOLUTION EPIDURAL; INTRACAUDAL
Status: COMPLETED | OUTPATIENT
Start: 2022-04-29 | End: 2022-04-29

## 2022-04-29 RX ORDER — EPHEDRINE SULFATE 50 MG/ML
INJECTION, SOLUTION INTRAVENOUS
Status: DISCONTINUED | OUTPATIENT
Start: 2022-04-29 | End: 2022-05-02

## 2022-04-29 RX ORDER — FENTANYL CITRATE 50 UG/ML
25-200 INJECTION, SOLUTION INTRAMUSCULAR; INTRAVENOUS
Status: DISCONTINUED | OUTPATIENT
Start: 2022-04-29 | End: 2022-04-29 | Stop reason: HOSPADM

## 2022-04-29 RX ORDER — MIDAZOLAM HYDROCHLORIDE 1 MG/ML
.5-4 INJECTION INTRAMUSCULAR; INTRAVENOUS
Status: DISCONTINUED | OUTPATIENT
Start: 2022-04-29 | End: 2022-04-29 | Stop reason: HOSPADM

## 2022-04-29 RX ORDER — HYDROMORPHONE HYDROCHLORIDE 1 MG/ML
0.2 INJECTION, SOLUTION INTRAMUSCULAR; INTRAVENOUS; SUBCUTANEOUS EVERY 5 MIN PRN
Status: DISCONTINUED | OUTPATIENT
Start: 2022-04-29 | End: 2022-04-29 | Stop reason: HOSPADM

## 2022-04-29 RX ORDER — HYDROCODONE BITARTRATE AND ACETAMINOPHEN 5; 325 MG/1; MG/1
1 TABLET ORAL EVERY 4 HOURS PRN
Status: DISCONTINUED | OUTPATIENT
Start: 2022-04-29 | End: 2022-04-29 | Stop reason: HOSPADM

## 2022-04-29 RX ORDER — NAPROXEN SODIUM 220 MG
220 TABLET ORAL 2 TIMES DAILY WITH MEALS
COMMUNITY
End: 2022-06-09

## 2022-04-29 RX ORDER — LIDOCAINE HYDROCHLORIDE 20 MG/ML
INJECTION, SOLUTION EPIDURAL; INFILTRATION; INTRACAUDAL; PERINEURAL
Status: DISCONTINUED | OUTPATIENT
Start: 2022-04-29 | End: 2022-05-02

## 2022-04-29 RX ORDER — LIDOCAINE HYDROCHLORIDE 10 MG/ML
1 INJECTION, SOLUTION EPIDURAL; INFILTRATION; INTRACAUDAL; PERINEURAL ONCE AS NEEDED
Status: DISCONTINUED | OUTPATIENT
Start: 2022-04-29 | End: 2022-04-29 | Stop reason: HOSPADM

## 2022-04-29 RX ORDER — DEXAMETHASONE SODIUM PHOSPHATE 4 MG/ML
INJECTION, SOLUTION INTRA-ARTICULAR; INTRALESIONAL; INTRAMUSCULAR; INTRAVENOUS; SOFT TISSUE
Status: DISCONTINUED | OUTPATIENT
Start: 2022-04-29 | End: 2022-05-02

## 2022-04-29 RX ORDER — NEOSTIGMINE METHYLSULFATE 0.5 MG/ML
INJECTION, SOLUTION INTRAVENOUS
Status: DISCONTINUED | OUTPATIENT
Start: 2022-04-29 | End: 2022-05-02

## 2022-04-29 RX ORDER — CEFAZOLIN SODIUM/WATER 2 G/20 ML
2 SYRINGE (ML) INTRAVENOUS
Status: COMPLETED | OUTPATIENT
Start: 2022-04-29 | End: 2022-04-29

## 2022-04-29 RX ORDER — ASPIRIN 81 MG/1
81 TABLET ORAL 2 TIMES DAILY
Qty: 56 TABLET | Refills: 0 | Status: SHIPPED | OUTPATIENT
Start: 2022-04-29 | End: 2022-04-29 | Stop reason: SDUPTHER

## 2022-04-29 RX ORDER — MIDAZOLAM HYDROCHLORIDE 1 MG/ML
INJECTION INTRAMUSCULAR; INTRAVENOUS
Status: DISCONTINUED
Start: 2022-04-29 | End: 2022-04-29 | Stop reason: HOSPADM

## 2022-04-29 RX ORDER — MIDAZOLAM HYDROCHLORIDE 1 MG/ML
INJECTION, SOLUTION INTRAMUSCULAR; INTRAVENOUS
Status: DISCONTINUED | OUTPATIENT
Start: 2022-04-29 | End: 2022-05-02

## 2022-04-29 RX ORDER — ONDANSETRON 8 MG/1
8 TABLET, ORALLY DISINTEGRATING ORAL EVERY 8 HOURS PRN
Status: DISCONTINUED | OUTPATIENT
Start: 2022-04-29 | End: 2022-04-29 | Stop reason: HOSPADM

## 2022-04-29 RX ORDER — ROCURONIUM BROMIDE 10 MG/ML
INJECTION, SOLUTION INTRAVENOUS
Status: DISCONTINUED | OUTPATIENT
Start: 2022-04-29 | End: 2022-05-02

## 2022-04-29 RX ORDER — MUPIROCIN 20 MG/G
OINTMENT TOPICAL
Status: DISCONTINUED | OUTPATIENT
Start: 2022-04-29 | End: 2022-04-29 | Stop reason: HOSPADM

## 2022-04-29 RX ORDER — PROPOFOL 10 MG/ML
VIAL (ML) INTRAVENOUS
Status: DISCONTINUED | OUTPATIENT
Start: 2022-04-29 | End: 2022-05-02

## 2022-04-29 RX ORDER — PROCHLORPERAZINE EDISYLATE 5 MG/ML
5 INJECTION INTRAMUSCULAR; INTRAVENOUS EVERY 30 MIN PRN
Status: DISCONTINUED | OUTPATIENT
Start: 2022-04-29 | End: 2022-04-29 | Stop reason: HOSPADM

## 2022-04-29 RX ORDER — FENTANYL CITRATE 50 UG/ML
INJECTION, SOLUTION INTRAMUSCULAR; INTRAVENOUS
Status: DISCONTINUED
Start: 2022-04-29 | End: 2022-04-29 | Stop reason: HOSPADM

## 2022-04-29 RX ORDER — ONDANSETRON 2 MG/ML
INJECTION INTRAMUSCULAR; INTRAVENOUS
Status: DISCONTINUED | OUTPATIENT
Start: 2022-04-29 | End: 2022-05-02

## 2022-04-29 RX ORDER — ACETAMINOPHEN 10 MG/ML
INJECTION, SOLUTION INTRAVENOUS
Status: DISCONTINUED | OUTPATIENT
Start: 2022-04-29 | End: 2022-05-02

## 2022-04-29 RX ADMIN — Medication 2 G: at 02:04

## 2022-04-29 RX ADMIN — NEOSTIGMINE METHYLSULFATE 4 MG: 0.5 INJECTION INTRAVENOUS at 04:04

## 2022-04-29 RX ADMIN — FENTANYL CITRATE 12.5 MCG: 50 INJECTION INTRAMUSCULAR; INTRAVENOUS at 03:04

## 2022-04-29 RX ADMIN — GLYCOPYRROLATE 0.6 MG: 0.2 INJECTION, SOLUTION INTRAMUSCULAR; INTRAVENOUS at 04:04

## 2022-04-29 RX ADMIN — SODIUM CHLORIDE, SODIUM GLUCONATE, SODIUM ACETATE, POTASSIUM CHLORIDE, MAGNESIUM CHLORIDE, SODIUM PHOSPHATE, DIBASIC, AND POTASSIUM PHOSPHATE: .53; .5; .37; .037; .03; .012; .00082 INJECTION, SOLUTION INTRAVENOUS at 02:04

## 2022-04-29 RX ADMIN — BUPIVACAINE HYDROCHLORIDE 15 ML: 5 INJECTION, SOLUTION EPIDURAL; INTRACAUDAL at 12:04

## 2022-04-29 RX ADMIN — EPHEDRINE SULFATE 5 MG: 50 INJECTION INTRAVENOUS at 03:04

## 2022-04-29 RX ADMIN — PROPOFOL 30 MG: 10 INJECTION, EMULSION INTRAVENOUS at 02:04

## 2022-04-29 RX ADMIN — MUPIROCIN: 20 OINTMENT TOPICAL at 11:04

## 2022-04-29 RX ADMIN — BUPIVACAINE HYDROCHLORIDE 30 ML: 5 INJECTION, SOLUTION EPIDURAL; INTRACAUDAL; PERINEURAL at 12:04

## 2022-04-29 RX ADMIN — FENTANYL CITRATE 25 MCG: 50 INJECTION INTRAMUSCULAR; INTRAVENOUS at 02:04

## 2022-04-29 RX ADMIN — PROPOFOL 140 MG: 10 INJECTION, EMULSION INTRAVENOUS at 01:04

## 2022-04-29 RX ADMIN — ONDANSETRON 4 MG: 2 INJECTION INTRAMUSCULAR; INTRAVENOUS at 02:04

## 2022-04-29 RX ADMIN — LIDOCAINE HYDROCHLORIDE 60 MG: 20 INJECTION, SOLUTION EPIDURAL; INFILTRATION; INTRACAUDAL at 01:04

## 2022-04-29 RX ADMIN — FENTANYL CITRATE 50 MCG: 50 INJECTION INTRAMUSCULAR; INTRAVENOUS at 01:04

## 2022-04-29 RX ADMIN — EPHEDRINE SULFATE 10 MG: 50 INJECTION INTRAVENOUS at 03:04

## 2022-04-29 RX ADMIN — SODIUM CHLORIDE: 0.9 INJECTION, SOLUTION INTRAVENOUS at 01:04

## 2022-04-29 RX ADMIN — PROPOFOL 30 MG: 10 INJECTION, EMULSION INTRAVENOUS at 03:04

## 2022-04-29 RX ADMIN — ROCURONIUM BROMIDE 40 MG: 10 INJECTION, SOLUTION INTRAVENOUS at 01:04

## 2022-04-29 RX ADMIN — MIDAZOLAM 2 MG: 1 INJECTION INTRAMUSCULAR; INTRAVENOUS at 01:04

## 2022-04-29 RX ADMIN — DEXAMETHASONE SODIUM PHOSPHATE 4 MG: 4 INJECTION INTRA-ARTICULAR; INTRALESIONAL; INTRAMUSCULAR; INTRAVENOUS; SOFT TISSUE at 02:04

## 2022-04-29 RX ADMIN — EPHEDRINE SULFATE 5 MG: 50 INJECTION INTRAVENOUS at 02:04

## 2022-04-29 RX ADMIN — EPHEDRINE SULFATE 10 MG: 50 INJECTION INTRAVENOUS at 02:04

## 2022-04-29 RX ADMIN — ACETAMINOPHEN 1000 MG: 10 INJECTION INTRAVENOUS at 02:04

## 2022-04-29 NOTE — H&P
H&P as below  L tib plateau fx  Plan ORIF        SUBJECTIVE:      Chief Complaint & History of Present Illness:  Niya Durbin is a New 67 y.o. year old female patient here with a history of constant left knee pain which started 1 week ago.  She reports her  had stepped into a drain gutter which caused him to fall forward and on top of her while at the [place] [employer].  She had pain in her left knee and pain with weight bearing.  She went to the ED [facility] and x-ray of her left knee, left tib/fib, and CT of her left knee were obtained.  Findings showed a proximal fibula fracture and a tibial plateau fracture.  She was prescribed Norco 5/325 mg PRN and placed into a knee immobilizer and told to follow up with Orthopedics.     Currently, patient reports she has tried to limit weight bearing activities as she had pain at the left knee with weight bearing.  Denies lower leg numbness or tingling sensation.  She endorses skin abrasions secondary to her immobilizer.  She has been using the Norco PRN for pain but does not give complete relief and has taken Aleve in addition to the Norco which gave her better pain control.  She reports she has a PMH of Osteoporosis and takes a shot twice a year for this otherwise she has no other medical issues.     Principle Orthopedic Problem      ICD-10-CM ICD-9-CM   1. Closed fracture of proximal end of left tibia, unspecified fracture morphology, initial encounter  S82.102A 823.00   2. Closed fracture of proximal end of left fibula, unspecified fracture morphology, initial encounter  S82.832A 823.01         Date of injury 4/23/22    Lives Home at home with Spouse             Independent community ambulator, no gait aids   Works as a retired   Does not use tobacco   Does not have diabetes   Does not have a history of heart attack, stroke, blood clot, cancer + Osteoporosis  Estimated body mass index is 21.64 kg/m² as calculated from the following:    Height as of  "this encounter: 5' 5" (1.651 m).    Weight as of this encounter: 59 kg (130 lb 1.1 oz).     Review of patient's allergies indicates:  No Known Allergies              Current Outpatient Medications   Medication Sig Dispense Refill    alendronate (FOSAMAX) 70 MG tablet Take 1 tablet (70 mg total) by mouth every 7 days. (Patient not taking: Reported on 4/20/2022) 4 tablet 11    denosumab (PROLIA) 60 mg/mL Syrg Inject 60 mg into the skin.        HYDROcodone-acetaminophen (NORCO) 5-325 mg per tablet Take 1 tablet by mouth every 4 (four) hours as needed for Pain. 15 tablet 0    triamcinolone acetonide 0.1% (KENALOG) 0.1 % cream Apply to affected areas of hands and legs BID prn rash. Do not use on face, underarms, or groin. 80 g 3      No current facility-administered medications for this visit.                Facility-Administered Medications Ordered in Other Visits   Medication Dose Route Frequency Provider Last Rate Last Admin    0.9%  NaCl infusion   Intravenous Continuous Santo Hoyt MD   Stopped at 09/26/19 1020    sodium chloride 0.9% flush 10 mL  10 mL Intravenous PRN Santo Hoyt MD                  Past Medical History:   Diagnosis Date    History of colon polyps       Hyperplastic    Ben Dmitry jaw-winking syndrome      No known health problems      Osteoporosis                 Past Surgical History:   Procedure Laterality Date    APPENDECTOMY   age 10    COLONOSCOPY N/A 9/26/2019     Procedure: COLONOSCOPY/suprep;  Surgeon: Santo Hoyt MD;  Location: [facility];  Service: Endoscopy;  Laterality: N/A;    TOTAL ABDOMINAL HYSTERECTOMY   age 35     w/ USO               Family History   Problem Relation Age of Onset    Stroke Mother      Stroke Father      Melanoma Neg Hx              Review of Systems:  ROS:  Constitutional: no fever or chills  Eyes: no visual changes  ENT: no nasal congestion or sore throat  Respiratory: no cough or shortness of breath  Cardiovascular: no chest pain " "or palpitations  Gastrointestinal: no nausea or vomiting, tolerating diet  Genitourinary: no hematuria or dysuria  Integument/Breast: no rash or pruritis  Hematologic/Lymphatic: no easy bruising or lymphadenopathy  Musculoskeletal: left tibia plateau fracture  Neurological: no seizures or tremors  Behavioral/Psych: no auditory or visual hallucinations  Endocrine: no heat or cold intolerance        OBJECTIVE:      PHYSICAL EXAM:  Vital Signs (Most Recent)  There were no vitals filed for this visit.     ,   Estimated body mass index is 21.63 kg/m² as calculated from the following:    Height as of 4/23/22: 5' 5" (1.651 m).    Weight as of 4/23/22: 59 kg (130 lb).   General Appearance: Well nourished, well developed, in no acute distress.  HENT: Normal cephalic, oropharynx pink and moist  Eyes: PERRLA bilaterally and EOM x 4  Respiratory: Even and unlabored  Skin: Warm and Dry. Bruising to left shin, skin abrasion to posterior LLE.  Psychiatric: AAO x 4, Mood & affect are normal.     left  Knee Exam:  Knee Range of Motion:limited by pain   Effusion:not significant  Condition of skin:intact, bruising and abrasion as stated above  Location of tenderness:Lateral joint line   Strength:limited by pain  ROM 0-20 degrees.        RADIOGRAPHS:  X-ray of the left knee, left tib fib, CT of the left knee dated April 23rd was personally reviewed by me.  Findings show a proximal fibular fracture on the left.  She also has a tibial plateau fracture with mild displacement on the left.  No other fracture seen.  All radiographs were personally reviewed by me.     ASSESSMENT/PLAN:          ICD-10-CM ICD-9-CM   1. Closed fracture of proximal end of left tibia, unspecified fracture morphology, initial encounter  S82.102A 823.00   2. Closed fracture of proximal end of left fibula, unspecified fracture morphology, initial encounter  S82.832A 823.01         Plan: We discussed with the patient at length all the different treatment options " available for  the knee including anti-inflammatories, acetaminophen, rest, ice, knee strengthening exercise, occasional cortisone injections for temporary relief, Viscosupplimentation injections, arthroscopic debridement osteotomy, and finally knee arthroplasty.      -Niya Durbin presents to clinic today with c/c left tibial plateau fracture secondary to  falling on top of her on March 23rd while at the [employer] [employer] [place].  -X-ray in CT as above.  Discussed finding and case with Dr. aJmes.  He reviewed the x-rays as well as the CT scan.  He is recommending surgical fixation.  -Patient reports she lives in a [place] back house in bedroom is on the 2nd floor and is requesting a hospital bed temporarily in toe she is able to ambulate back up to her bedroom.  Additionally she would like a wheelchair with leg extension given her most recent fracture.  These will be ordered for her today and prescription will be given to her spouse to take to [employer].  -I advised the patient of pain management using the MModal approach.  I will prescribe Celebrex 200 mg daily, Robaxin 500 mg q.i.d., gabapentin 100 mg q.h.s..  She has a prescription for Norco 5/325 mg that she will continue to use for pain control.     Discussed treatment options with patient. Operative vs non-operative treatment discussed with patient. Recommend operative fixation. Patient has agreed to proceed with surgery.     - Rest ice and elevation to reduce swelling.   - Discussed proper and consistent elevation of affected extremities, above the level of the heart, while at rest, to help control/improve edema and decrease pain.  - NWB has been recommended for her left lower leg, no bending at the knee   - Pain medication: refill needed, yes.  Patient given Robaxin, Celebrex, and Neurontin today   - Discussed pain medication refill policy.  - Patient informed that pain is controlled using a multi modal approach with the goal to  "get off opioids as quick as possible.       - Consents signed, yes   - Lab, chest x-ray and EKG has not been ordered previously, results in chart  - No results found for: HGBA1C   - Estimated body mass index is 21.64 kg/m² as calculated from the following:    Height as of this encounter: 5' 5" (1.651 m).    Weight as of this encounter: 59 kg (130 lb 1.1 oz).  -      - Niya Durbin is not taking blood thinners       Pre, mt, and post operative procedure and expectations were discussed.  Questions were answered. The patient has been educated and is ready to proceed with surgery.  Approximately 30 minutes was spent discussing surgical outcomes, plans, procedures, pre, mt, and post operative expectations and care. The risks, benefits and alternatives to surgery were discussed with the patient at great length.  These include bleeding, infection, vessel/nerve damage, pain, numbness, tingling, complex regional pain syndrome, hardware/surgical failure, need for further surgery, malunion, nonunion, DVT, PE, arthritis and death. He also understands that the risks of surgery may be greater for some patients due to health or lifestyle issues, such as a current condition or a history of heart disease, obesity, clotting disorders, recurrent infections, smoking, sedentary lifestyle, or noncompliance with medications, therapy, or followup. The degree of the increased risk is hard to estimate with any degree of precision.  Patient states an understanding and wishes to proceed with surgery.   All questions were answered.  No guarantees were implied or stated.  Informed consent was obtained  The patient will contact us if the have any questions, concerns, and changes in their medical condition prior to surgery.     Patient has wheelchair and will bring with them the day of surgery. They have been counseled on proper use of the assistive device.     "

## 2022-04-29 NOTE — ANESTHESIA PROCEDURE NOTES
Intubation    Date/Time: 4/29/2022 1:39 PM  Performed by: Cassandra Flores CRNA  Authorized by: Krystle Henriquez MD     Intubation:     Induction:  Intravenous    Intubated:  Postinduction    Mask Ventilation:  Easy mask    Attempts:  1    Attempted By:  CRNA    Method of Intubation:  Direct    Blade:  Melissa 2    Laryngeal View Grade: Grade I - full view of cords      Difficult Airway Encountered?: No      Complications:  None    Airway Device:  Oral endotracheal tube    Airway Device Size:  7.0    Style/Cuff Inflation:  Cuffed    Inflation Amount (mL):  8    Tube secured:  21    Secured at:  The lips    Placement Verified By:  Capnometry and Revisualization with laryngoscopy    Complicating Factors:  None    Findings Post-Intubation:  BS equal bilateral and atraumatic/condition of teeth unchanged

## 2022-04-29 NOTE — ANESTHESIA PREPROCEDURE EVALUATION
04/29/2022  Niya Durbin is a 67 y.o., female.      Pre-op Assessment          Review of Systems  Social:  Non-Smoker, Social Alcohol Use    Hepatic/GI:   History of colon polyps   Musculoskeletal:   History of pelvic fracture   Neurological:   Neuromuscular Disease, Ben Dmitry jaw-winking        Physical Exam  General: Cooperative and Alert    Airway:  Mallampati: II   Mouth Opening: Normal  TM Distance: Normal  Tongue: Normal        Anesthesia Plan  Type of Anesthesia, risks & benefits discussed:    Anesthesia Type: Gen ETT  Intra-op Monitoring Plan: Standard ASA Monitors  Post Op Pain Control Plan: multimodal analgesia  Induction:  IV  Informed Consent: Informed consent signed with the Patient and all parties understand the risks and agree with anesthesia plan.  All questions answered.   ASA Score: 1  Day of Surgery Review of History & Physical: H&P Update referred to the surgeon/provider.    Ready For Surgery From Anesthesia Perspective.     .

## 2022-04-29 NOTE — BRIEF OP NOTE
[facility] - Surgery ([facility])  Brief Operative Note    Surgery Date: 4/29/2022     Surgeon(s) and Role:     * Bal James MD - Primary     * Scott Bertrand MD - Resident - Assisting        Pre-op Diagnosis:  Closed fracture of proximal end of left tibia, unspecified fracture morphology, initial encounter [S82.102A]  Closed fracture of proximal end of left fibula, unspecified fracture morphology, initial encounter [S82.832A]    Post-op Diagnosis:  Post-Op Diagnosis Codes:     * Closed fracture of proximal end of left tibia, unspecified fracture morphology, initial encounter [S82.102A]     * Closed fracture of proximal end of left fibula, unspecified fracture morphology, initial encounter [S82.832A]    Procedure(s) (LRB):  ORIF, FRACTURE, TIBIA, PLATEAU (Left)    Anesthesia: Choice    Operative Findings: see OP note    Estimated Blood Loss: < 10 mL         Specimens:   Specimen (24h ago, onward)            None            Discharge Note    OUTCOME: Patient tolerated treatment/procedure well without complication and is now ready for discharge.    DISPOSITION: Home or Self Care    FINAL DIAGNOSIS:  Left tibial plateau fracture    FOLLOWUP: In clinic    DISCHARGE INSTRUCTIONS:    Discharge Procedure Orders   Diet general     Call MD for:  temperature >100.4     Call MD for:  persistent nausea and vomiting     Call MD for:  severe uncontrolled pain     Call MD for:  difficulty breathing, headache or visual disturbances     Call MD for:  redness, tenderness, or signs of infection (pain, swelling, redness, odor or green/yellow discharge around incision site)     Call MD for:  hives     Call MD for:  persistent dizziness or light-headedness     Call MD for:  extreme fatigue     No driving, operating heavy equipment or signing legal documents while taking pain medication     Leave dressing on - Keep it clean, dry, and intact until clinic visit     Weight bearing restrictions (specify)   Order Comments: Toe-touch  weight bearing only to the left lower extremity (weight of foot only)

## 2022-04-29 NOTE — ANESTHESIA PROCEDURE NOTES
Popliteal Single Shot    Patient location during procedure: pre-op   Block not for primary anesthetic.  Reason for block: at surgeon's request and post-op pain management   Post-op Pain Location: L leg pain   Start time: 4/29/2022 12:06 PM  Timeout: 4/29/2022 12:05 PM   End time: 4/29/2022 12:12 PM    Staffing  Authorizing Provider: Claire Garcia MD  Performing Provider: Graciela Tam MD    Preanesthetic Checklist  Completed: patient identified, IV checked, site marked, risks and benefits discussed, surgical consent, monitors and equipment checked, pre-op evaluation and timeout performed  Peripheral Block  Patient position: supine  Prep: ChloraPrep  Patient monitoring: heart rate, cardiac monitor, continuous pulse ox, continuous capnometry and frequent blood pressure checks  Block type: popliteal  Laterality: left  Injection technique: single shot  Needle  Needle type: Stimuplex   Needle gauge: 21 G  Needle length: 4 in  Needle localization: anatomical landmarks and ultrasound guidance   -ultrasound image captured on disc.  Assessment  Injection assessment: negative aspiration, negative parasthesia and local visualized surrounding nerve  Paresthesia pain: none  Heart rate change: no  Slow fractionated injection: yes    Medications:    Medications: bupivacaine (pf) (MARCAINE) injection 0.5% - Perineural   30 mL - 4/29/2022 12:09:00 PM    Additional Notes  Time out conducted. Site nissa confirmed with team and patient. Allergies reviewed.   Vital signs stable throughout block. RN monitoring vitals throughout.   Needle advanced under continuous ultrasound guidance.  Local injected incrementally after confirming negative aspiration. No signs or symptoms of intravascular or intraneural injection noted.   No persistent paresthesias elicited or expressed. Patient tolerated procedure well.  30 cc 0.5% Bupivacaine with epinephrine 1:300K used for the block.

## 2022-04-29 NOTE — ANESTHESIA PROCEDURE NOTES
Saphaneous Single Shot    Patient location during procedure: pre-op   Block not for primary anesthetic.  Reason for block: at surgeon's request and post-op pain management   Post-op Pain Location: Left leg pain   Start time: 4/29/2022 12:06 PM  Timeout: 4/29/2022 12:05 PM   End time: 4/29/2022 12:12 PM    Staffing  Authorizing Provider: Claire Garcia MD  Performing Provider: Graciela Tam MD    Preanesthetic Checklist  Completed: patient identified, IV checked, site marked, risks and benefits discussed, surgical consent, monitors and equipment checked, pre-op evaluation and timeout performed  Peripheral Block  Patient position: supine  Prep: ChloraPrep  Patient monitoring: heart rate, cardiac monitor, continuous pulse ox, continuous capnometry and frequent blood pressure checks  Block type: popliteal  Laterality: left  Injection technique: single shot  Needle  Needle type: Stimuplex   Needle gauge: 21 G  Needle length: 4 in  Needle localization: anatomical landmarks and ultrasound guidance   -ultrasound image captured on disc.  Assessment  Injection assessment: negative aspiration, negative parasthesia and local visualized surrounding nerve  Paresthesia pain: none  Heart rate change: no  Slow fractionated injection: yes    Medications:    Medications: bupivacaine (pf) (MARCAINE) injection 0.5% - Perineural   15 mL - 4/29/2022 12:11:00 PM    Additional Notes  Time out conducted. Site nissa confirmed with team and patient. Allergies reviewed.   Vital signs stable throughout block. RN monitoring vitals throughout.   Needle advanced under continuous ultrasound guidance.  Local injected incrementally after confirming negative aspiration. No signs or symptoms of intravascular or intraneural injection noted.   No persistent paresthesias elicited or expressed. Patient tolerated procedure well.  15 cc 0.5% Bupivacaine with epinephrine 1:300K, PF dexamethasone 1 mg, and clonidine 50 mcg used for the block.

## 2022-04-29 NOTE — OP NOTE
OPERATIVE NOTE    DATE OF PROCEDURE:  04/29/2022    PREOPERATIVE DIAGNOSIS:   Left tibial plateau fracture, lateral condyle, closed, displaced, initial encounter  Fall from standing    POSTOPERATIVE DIAGNOSIS:   Left tibial plateau fracture, lateral condyle, closed, displaced, initial encounter  Fall from standing    PROCEDURE:   Open reduction internal fixation left tibial plateau fracture    SURGEON:   Bal James MD    ASSISTANT:    Scott Bertrand MD    ANESTHESIA:   Regional block plus general    EBL:    10 mL    COMPLICATIONS:  None    IMPLANTS:   Andrew  Axsos3 proximal lateral tibial plate  4.0 mm locking screw, x3  4.0 mm cancellous screw, x1  3.5 mm cortical screw, x3  4.0 mm locking screw, x1    Cancellous allograft bone chips    SPECIMENS:   None    INDICATIONS FOR PROCEDURE:  67F fall 04/23/2022, left knee pain, seen outside hospital ED, diagnosed with left tibial plateau fracture, placed into a knee immobilizer, sent for orthopedic follow-up.    Seen in Orthopedic Clinic 04/23/2022.  CT scan x-rays indicated a left tibial plateau  lateral split depression with intact articular surface, but some metaphyseal impaction laterally.  No other injuries were identified, she was neurovascular intact distally with soft compartments.  At that time we discussed her injury and non operative versus operative management.  She presents today for open reduction internal fixation.    Lives Home at home with Spouse             Independent community ambulator, no gait aids   Works as a retired   Does not use tobacco   Does not have diabetes   Does not have a history of heart attack, stroke, blood clot, cancer + Osteoporosis, on Prolia      The risks, benefits, and alternatives to surgery were discussed with the patient and/or family.    Specific risks discussed included, but were not limited to:  Knee pain, malalignment, malunion, valgus instability, prominent intra-articular hardware, need for later knee  replacement, knee stiffness, wound breakdown, need for multiple staged surgeries, damage to nearby structures, including neurovascular structures leading to loss of function or loss of limb, bleeding, need for blood transfusion, pain, stiffness, scarring, numbness, tingling, weakness, compartment syndrome, malunion/nonunion, hardware failure, hardware prominence, infection, need for multiple staged procedures, prolonged antibiotics, iatrogenic fracture, heterotopic ossification, arthritis, a variety of medical complications including but not limited to heart attack, stroke, deep venous thrombosis, pulmonary embolism, prolonged hospitalization, prolonged intubation, and death.   Patient and/or family expressed an understanding and desires to proceed with surgery.   All questions were answered.  No guarantees were implied or stated.  Informed consent was obtained.        OPERATIVE PROCEDURE:  Patient met in the preoperative hold area and the correct site and side of surgery being the left lower extremity were marked and verified.  Regional block placed by Anesthesia.  Patient brought back to the operative suite.  General anesthesia smoothly induced.  Patient transferred over to operative table.  Placed in supine position. All bony prominences were appropriately padded.  Operative extremity placed on bone foam.  Tourniquet placed high on thigh.  Patient received 2 g Ancef for preoperative antibiotics.  The left lower extremity was then prepped and draped in normal sterile fashion.    Time-out was performed verifying the correct patient, site/side of surgery, surgical consent, radiographs as applicable, preop antibiotics, necessary equipment, anticipated blood loss, length of procedure, postoperative disposition.      Performed lateral approach to the left proximal tibia.  Hockey-stick type incision was made from Gerdy tubercle posterior lateral along the joint line.  We used meticulous soft tissue technique.  Sharply  dissected down to the fascia.  Fascia was then split in line with the skin incision.  We identified the anterior compartment musculature, this was teased off the tibia.  Fracture site was identified.  We then dissected posteriorly to allow later plate placement, and at subperiosteal fashion.  This was repeated in anteriorly.  Sub meniscal arthrotomy performed in order to release the hematoma and irrigate joint.  The capsule/meniscus was tagged with 2 Ethibond for later repaired through holes in the plate.  We used a Villatoro to make a submuscular path on anterior lateral aspect tibia for later plate placement.    There was a coronal split in the tibial plateau as noted on prior CT imaging.  We utilized our surgical approach and placed a wire for a 5.0 mm screw from anterior to posterior using fluoroscopic guidance along the lateral plateau.  Fluoroscopic imaging confirmed appropriate wire placement in an extra-articular location.  Appropriate size partially-threaded cannulated 5.0 mm screw was then placed over the wire care to to not penetrate the articular surface or push the wire into the posterior structures.    Next we used an osteotome to entered through the fracture site and elevate the lateral joint surface, which was depressed as a unit in the metaphyseal region.  We able to elevate this up slightly, and fill in the void with cancellous allograft chips, which supported our reduction nicely.    We then chose the appropriate size plate.  Slid in a submuscular fashion through our prior incision.  We used fluoroscopic guidance and pinned in place proximally.  We then used a percutaneous stab incision and a outrigger guide to place a wire distally.  We adjusted the plate as needed.  We then used our proximal incision to place a 4.0 mm cancellous screw distal to the fracture suck the plate down further support our reduction.  Fluoroscopic imaging confirmed appropriate hardware placement fracture reduction.  Proximally  we placed a 3.5 mm cortical screw to suck the proximal aspect of the plate down.  2 locking screws were placed the proximal row as well.  Previously placed proximal cortical screw was exchanged out for appropriate size locking screw, as the cortical screw did not have strong purchase.  Next we walked the plate down utilizing the outrigger guide, and percutaneous stab incisions starting proximally and working distally.  We placed 2 further cortical screws through these percutaneous stab incisions which sucked the plate down nicely.  For added fixation given her osteoporotic bone, we placed a 4.0 mm locking screw through a percutaneous stab incision using outrigger guide.  Outrigger guide was removed.  We reassessed our fracture reduction, hardware placement and were satisfied.    Wounds irrigated with saline.  Hemostasis achieved as needed with electrocautery.  Fascia closed with 0 Vicryl.  Deep tissue closed with 0 Vicryl.  Subcutaneous tissue closed with 2-0 Vicryl.  Skin closed with 3-0 Monocryl.  Dermabond, Aquacel ribbon, gauze, Tegaderm, cast padding, Ace wrap, Ice Man dressing applied.    At the conclusion of procedure the patient had soft and compressible compartments, brisk cap refill, palpable DP/PT pulse in the operative extremity.    Prior to final closure all counts were confirmed to be correct.  Patient tolerated the procedure well without any complications, was awoken from anesthesia, transferred PACU for further recovery.    POSTOPERATIVE PLAN:  67-year-old female, fall 04/23/2022, left tibial plateau lateral split depression in the setting of osteoporotic bone.    04/29/2022- open reduction internal fixation left tibial plateau fracture    ASA 81 mg b.i.d. x6 weeks for DVT prophylaxis  Foot flat touchdown weight-bearing left lower extremity x6 weeks  Range of motion as tolerated left lower extremity    Calcium, vitamin-D    X-rays at subsequent followups:  Left knee    Follow-up postop 2 weeks, 6  weeks, 3 months, 6 months, 1 year    =====================  Bal James MD  Orthopaedic Surgery

## 2022-05-02 NOTE — TRANSFER OF CARE
"Anesthesia Transfer of Care Note    Patient: Niya Durbin    Procedure(s) Performed: Procedure(s) (LRB):  ORIF, FRACTURE, TIBIA, PLATEAU (Left)    Patient location: PACU    Anesthesia Type: general    Transport from OR: Transported from OR on 6-10 L/min O2 by face mask with adequate spontaneous ventilation    Post pain: adequate analgesia    Post assessment: no apparent anesthetic complications    Post vital signs: stable    Level of consciousness: awake    Nausea/Vomiting: no nausea/vomiting    Complications: none    Transfer of care protocol was followed      Last vitals:   Visit Vitals  /64 (BP Location: Right arm, Patient Position: Lying)   Pulse 69   Temp 36.5 °C (97.7 °F) (Skin)   Resp 18   Ht 5' 5" (1.651 m)   Wt 59 kg (130 lb 1.1 oz)   SpO2 97%   Breastfeeding No   BMI 21.64 kg/m²     "

## 2022-05-03 NOTE — ANESTHESIA POSTPROCEDURE EVALUATION
Anesthesia Post Evaluation    Patient: Niya Durbin    Procedure(s) Performed: Procedure(s) (LRB):  ORIF, FRACTURE, TIBIA, PLATEAU (Left)    Final Anesthesia Type: general      Patient location during evaluation: PACU  Patient participation: Yes- Able to Participate  Level of consciousness: awake and alert  Post-procedure vital signs: reviewed and stable  Pain management: adequate  Airway patency: patent    PONV status at discharge: No PONV  Anesthetic complications: no      Cardiovascular status: blood pressure returned to baseline  Respiratory status: unassisted, room air and spontaneous ventilation  Hydration status: euvolemic  Follow-up not needed.          Vitals Value Taken Time   /64 04/29/22 1800   Temp 36.5 °C (97.7 °F) 04/29/22 1800   Pulse 69 04/29/22 1800   Resp 18 04/29/22 1800   SpO2 97 % 04/29/22 1800         No case tracking events are documented in the log.      Pain/Felipe Score: No data recorded

## 2022-05-09 ENCOUNTER — PATIENT MESSAGE (OUTPATIENT)
Dept: ORTHOPEDICS | Facility: CLINIC | Age: 67
End: 2022-05-09
Payer: MEDICARE

## 2022-05-13 ENCOUNTER — HOSPITAL ENCOUNTER (OUTPATIENT)
Dept: RADIOLOGY | Facility: HOSPITAL | Age: 67
Discharge: HOME | End: 2022-05-13
Attending: NURSE PRACTITIONER
Payer: MEDICARE

## 2022-05-13 ENCOUNTER — OFFICE VISIT (OUTPATIENT)
Dept: ORTHOPEDICS | Facility: CLINIC | Age: 67
End: 2022-05-13
Payer: MEDICARE

## 2022-05-13 ENCOUNTER — PATIENT MESSAGE (OUTPATIENT)
Dept: ORTHOPEDICS | Facility: CLINIC | Age: 67
End: 2022-05-13

## 2022-05-13 ENCOUNTER — HOSPITAL ENCOUNTER (OUTPATIENT)
Dept: CARDIOLOGY | Facility: HOSPITAL | Age: 67
Discharge: HOME | End: 2022-05-13
Attending: NURSE PRACTITIONER
Payer: MEDICARE

## 2022-05-13 VITALS — HEIGHT: 65 IN | BODY MASS INDEX: 21.67 KG/M2 | WEIGHT: 130.06 LBS

## 2022-05-13 DIAGNOSIS — Z98.890 POST-OPERATIVE STATE: ICD-10-CM

## 2022-05-13 DIAGNOSIS — M79.89 PAIN AND SWELLING OF LEFT LOWER LEG: ICD-10-CM

## 2022-05-13 DIAGNOSIS — I82.452 ACUTE DEEP VEIN THROMBOSIS (DVT) OF LEFT PERONEAL VEIN: Primary | ICD-10-CM

## 2022-05-13 DIAGNOSIS — S82.832D CLOSED FRACTURE OF PROXIMAL END OF LEFT FIBULA WITH ROUTINE HEALING, UNSPECIFIED FRACTURE MORPHOLOGY, SUBSEQUENT ENCOUNTER: ICD-10-CM

## 2022-05-13 DIAGNOSIS — M79.662 PAIN AND SWELLING OF LEFT LOWER LEG: ICD-10-CM

## 2022-05-13 DIAGNOSIS — S82.102D CLOSED FRACTURE OF PROXIMAL END OF LEFT TIBIA WITH ROUTINE HEALING, UNSPECIFIED FRACTURE MORPHOLOGY, SUBSEQUENT ENCOUNTER: Primary | ICD-10-CM

## 2022-05-13 DIAGNOSIS — M25.562 ACUTE PAIN OF LEFT KNEE: Primary | ICD-10-CM

## 2022-05-13 DIAGNOSIS — M25.562 ACUTE PAIN OF LEFT KNEE: ICD-10-CM

## 2022-05-13 PROCEDURE — 1125F AMNT PAIN NOTED PAIN PRSNT: CPT | Mod: CPTII,S$GLB,, | Performed by: NURSE PRACTITIONER

## 2022-05-13 PROCEDURE — 3288F FALL RISK ASSESSMENT DOCD: CPT | Mod: CPTII,S$GLB,, | Performed by: NURSE PRACTITIONER

## 2022-05-13 PROCEDURE — 1159F MED LIST DOCD IN RCRD: CPT | Mod: CPTII,S$GLB,, | Performed by: NURSE PRACTITIONER

## 2022-05-13 PROCEDURE — 1159F PR MEDICATION LIST DOCUMENTED IN MEDICAL RECORD: ICD-10-PCS | Mod: CPTII,S$GLB,, | Performed by: NURSE PRACTITIONER

## 2022-05-13 PROCEDURE — 1101F PR PT FALLS ASSESS DOC 0-1 FALLS W/OUT INJ PAST YR: ICD-10-PCS | Mod: CPTII,S$GLB,, | Performed by: NURSE PRACTITIONER

## 2022-05-13 PROCEDURE — 3288F PR FALLS RISK ASSESSMENT DOCUMENTED: ICD-10-PCS | Mod: CPTII,S$GLB,, | Performed by: NURSE PRACTITIONER

## 2022-05-13 PROCEDURE — 99999 PR PBB SHADOW E&M-EST. PATIENT-LVL III: ICD-10-PCS | Mod: PBBFAC,,, | Performed by: NURSE PRACTITIONER

## 2022-05-13 PROCEDURE — 73560 X-RAY EXAM OF KNEE 1 OR 2: CPT | Mod: 26,LT,, | Performed by: RADIOLOGY

## 2022-05-13 PROCEDURE — 93971 EXTREMITY STUDY: CPT | Mod: LT

## 2022-05-13 PROCEDURE — 1160F PR REVIEW ALL MEDS BY PRESCRIBER/CLIN PHARMACIST DOCUMENTED: ICD-10-PCS | Mod: CPTII,S$GLB,, | Performed by: NURSE PRACTITIONER

## 2022-05-13 PROCEDURE — 1101F PT FALLS ASSESS-DOCD LE1/YR: CPT | Mod: CPTII,S$GLB,, | Performed by: NURSE PRACTITIONER

## 2022-05-13 PROCEDURE — 99024 POSTOP FOLLOW-UP VISIT: CPT | Mod: S$GLB,,, | Performed by: NURSE PRACTITIONER

## 2022-05-13 PROCEDURE — 73560 X-RAY EXAM OF KNEE 1 OR 2: CPT | Mod: TC,LT

## 2022-05-13 PROCEDURE — 3008F PR BODY MASS INDEX (BMI) DOCUMENTED: ICD-10-PCS | Mod: CPTII,S$GLB,, | Performed by: NURSE PRACTITIONER

## 2022-05-13 PROCEDURE — 99999 PR PBB SHADOW E&M-EST. PATIENT-LVL III: CPT | Mod: PBBFAC,,, | Performed by: NURSE PRACTITIONER

## 2022-05-13 PROCEDURE — 73560 XR KNEE 1 OR 2 VIEW LEFT: ICD-10-PCS | Mod: 26,LT,, | Performed by: RADIOLOGY

## 2022-05-13 PROCEDURE — 99024 PR POST-OP FOLLOW-UP VISIT: ICD-10-PCS | Mod: S$GLB,,, | Performed by: NURSE PRACTITIONER

## 2022-05-13 PROCEDURE — 93971 CV US DOPPLER VENOUS LEG LEFT (CUPID ONLY): ICD-10-PCS | Mod: 26,LT,, | Performed by: INTERNAL MEDICINE

## 2022-05-13 PROCEDURE — 1125F PR PAIN SEVERITY QUANTIFIED, PAIN PRESENT: ICD-10-PCS | Mod: CPTII,S$GLB,, | Performed by: NURSE PRACTITIONER

## 2022-05-13 PROCEDURE — 93971 EXTREMITY STUDY: CPT | Mod: 26,LT,, | Performed by: INTERNAL MEDICINE

## 2022-05-13 PROCEDURE — 1160F RVW MEDS BY RX/DR IN RCRD: CPT | Mod: CPTII,S$GLB,, | Performed by: NURSE PRACTITIONER

## 2022-05-13 PROCEDURE — 3008F BODY MASS INDEX DOCD: CPT | Mod: CPTII,S$GLB,, | Performed by: NURSE PRACTITIONER

## 2022-05-13 NOTE — PROGRESS NOTES
Ms. Durbin is here today for a post-operative visit after undergoing an ORIF for her left tibial plateau fracture by Dr. James on 4/29/2022.    Interval History:  She reports that she is doing ok.  Pain is tolerable.  She is taking pain medication.  She is using the Norco and tylenol.  Finds Norco leaves her drowsy in the morning and Tylenol alone does not give complete relief.  She stopped Robaxin and Neurontin.  She is taking her ASA bid as instructed.  She is still dealing with LLE swelling and reports some calf pain.  Denies SOB.  She denies fever, chills, and sweats since the time of the surgery.     Physical exam:  Dressing taken down.  Incision is clean, dry and intact.  Skin was closed with Dermabond and Stratifix ends were clipped.  She has tactile stimulation to their lower leg, she reports calf pain, there is trace leg edema and their pedal pulse is palpable x 2.   ROM of knee is 0-80 degrees.    RADS: Left knee x-ray was obtained and personally reviewed by me.  Her lateral side place and screw fixation appears to be in good position, no evidence of hardware failure.  Fracture is stable, no new fractures seen.    Assessment:  Post-op visit (2 weeks)    Plan:  No diagnosis found.  Current care, treatment plan, precautions, activity level/ modifications, limitations, rehabilitation exercises and proposed future treatment were discussed with the patient. We discussed the need to monitor for changes in symptoms and condition and report them to the physician.  Discussed importance of compliance with all appointments and follow up examinations.     WOUND CARE ORDERS  - Do not remove surgical dressing for 2 weeks post-op. This will be done only by MD/LETA at initial post-op visit. If dressing is completely saturated, Call number below.   - Do not get dressings wet.   - Do not shower.   - If dressing continues to be saturated or there are signs of infection, please call Ortho Clinic [phone] for further  instructions and to make appt to be seen.       PHYSICAL THERAPY:   - Continue therapy as ordered.  - Weight bearing flat foot touchdown weight bearing to LLE x 6 weeks.  - Range of motion as tolerated.   - Will place her into a hinged knee brace. Lock in extension during transfers only.    OTHER:  - Will get LLE ultrasound to r/o DVT.     PAIN MEDICATION:   - Pain medication: refill was not needed, recommend taking Norco around 1700 and resuming Neurontin 100 mg qhs.  May also try Aleve or Motrin OTC.  - Pain medication refill policy provided to patient for review, yes.    - Patient was informed a multi-modal approach is used to treat their pain.     DVT PROPHYLAXIS:   - ASA 81 mg bid x 6 weeks.     FOLLOW UP:   - Patient will follow up in the clinic in 4 weeks.  - X-ray of her left knee non standing is needed.    - Future Appointments:   Future Appointments   Date Time Provider Department Center   6/9/2022 10:00 AM [facility] FRACTURE CARE CLINIC [facility] FRA CAR [facility]   6/9/2022 10:45 AM Martin Cleary NP [facility] ORTHO [facility]   10/21/2022  1:45 PM INJECTION [facility] INF [facility]           If there are any questions prior to scheduled follow up, the patient was instructed to contact the office

## 2022-05-14 NOTE — TELEPHONE ENCOUNTER
Patient presented to clinic today for post-op visit.  Her left lower leg was swollen with complaints of calf pain.  She was taking ASA 81 mg bid post op for DVT ppx.  An ultrasound was ordered and patient found to have a nonocclusive DVT of the left posterior tibial and peroneal veins.  I was notified by Dr. Law with Cardiology.  He recommended starting patient on Eliquis 10 mg bid x 1 week followed by 5 mg bid thereafter and he will see her in clinic.    Patient called and notified of findings. She was instructed to stop ASA and start Eliquis.  Medication called in to [facility].  Dr. James notified of findings.

## 2022-05-16 ENCOUNTER — PATIENT MESSAGE (OUTPATIENT)
Dept: ORTHOPEDICS | Facility: CLINIC | Age: 67
End: 2022-05-16
Payer: MEDICARE

## 2022-05-16 DIAGNOSIS — I82.452 ACUTE DEEP VEIN THROMBOSIS (DVT) OF LEFT PERONEAL VEIN: Primary | ICD-10-CM

## 2022-05-18 ENCOUNTER — CLINICAL SUPPORT (OUTPATIENT)
Dept: REHABILITATION | Facility: HOSPITAL | Age: 67
End: 2022-05-18
Payer: MEDICARE

## 2022-05-18 DIAGNOSIS — S82.102D CLOSED FRACTURE OF PROXIMAL END OF LEFT TIBIA WITH ROUTINE HEALING, UNSPECIFIED FRACTURE MORPHOLOGY, SUBSEQUENT ENCOUNTER: ICD-10-CM

## 2022-05-18 DIAGNOSIS — M25.662 DECREASED RANGE OF MOTION (ROM) OF LEFT KNEE: ICD-10-CM

## 2022-05-18 DIAGNOSIS — M62.81 MUSCLE WEAKNESS OF LOWER EXTREMITY: ICD-10-CM

## 2022-05-18 PROCEDURE — 97110 THERAPEUTIC EXERCISES: CPT | Mod: PO

## 2022-05-18 PROCEDURE — 97161 PT EVAL LOW COMPLEX 20 MIN: CPT | Mod: PO

## 2022-05-19 ENCOUNTER — PATIENT MESSAGE (OUTPATIENT)
Dept: ORTHOPEDICS | Facility: CLINIC | Age: 67
End: 2022-05-19
Payer: MEDICARE

## 2022-05-19 PROBLEM — M62.81 MUSCLE WEAKNESS OF LOWER EXTREMITY: Status: ACTIVE | Noted: 2022-05-19

## 2022-05-19 PROBLEM — M25.662 DECREASED RANGE OF MOTION (ROM) OF LEFT KNEE: Status: ACTIVE | Noted: 2022-05-19

## 2022-05-19 PROBLEM — S82.102D CLOSED FRACTURE OF UPPER END OF LEFT TIBIA WITH ROUTINE HEALING: Status: ACTIVE | Noted: 2022-05-19

## 2022-05-19 NOTE — PLAN OF CARE
[facility]   Physical Therapy Initial Evaluation     Date: 5/18/2022   [id number]    Therapy Diagnosis:   Encounter Diagnoses   Name Primary?    Closed fracture of proximal end of left tibia with routine healing, unspecified fracture morphology, subsequent encounter     Decreased range of motion (ROM) of left knee     Muscle weakness of lower extremity      Physician: Martin Cleary NP    Physician Orders: PT Eval and Treat   Medical Diagnosis from Referral: Closed fracture of proximal end of left tibia with routine healing, unspecified fracture morphology, subsequent encounter [S82.102D]  Evaluation Date: 5/18/2022  Authorization Period Expiration: 5/13/22  Plan of Care Expiration: 8/18/22  Progress Note Due: 6/18/22  Visit # / Visits authorized: 1/ 1   FOTO: 0/3    PTA Visit #: 0/5    Precautions: Standard and toe touch weight bearing, DVT, osteoporosis  PROCEDURE:              Open reduction internal fixation left tibial plateau fracture  Foot flat touchdown weight-bearing left lower extremity x6 weeks (until 7/10/22)  Range of motion as tolerated left lower extremity  Will place her into a hinged knee brace. Lock in extension during transfers only.    Time In: 1:30 pm  Time Out: 2:15 pm  Total Appointment Time (timed & untimed codes): 45 minutes      SUBJECTIVE     Date of onset: 4/29/22    History of current condition - Gina reports: Fell to her hands and knees and  fell on top of her leg resulting in a tibia fracture requiring ORIF on 4/29/22. She is not able to put weihgt on the foot yet and is under touch down WB precautions until July 10. She has worked on bending her knee a little bit but is not able to get very far. She has a hospital bed downstairs that she is using for now. She also has a full bathroom downstairs. She has had tingling in the entire foot since surgery. She felt like the tingling was starting to subside  but then came back as soon as she put the brace on today.     Falls: yes, see above    Imaging, x-ray 5/13/22: FINDINGS:  Interval changes of open reduction and internal fixation of previously noted proximal tibial fractures with plate and screws.  No detrimental changes in the appearance of the fracture site or position and alignment of fracture fragments, fracture line still evident.  Stable subacute fracture of the proximal fibula diametaphyseal region.  No new fractures.  Some stable spurring about the posterior patella.  Small suprapatellar bursal effusion.  No prepatellar soft tissue swelling    Prior Therapy: no  Social History:  lives with their spouse who is able to help her  Occupation: retired, [place]  Prior Level of Function: independent  Current Level of Function: touch down WB on L leg     Pain:  Current 6/10, worst 8/10, best 6/10   Location: left just inferior to lateral knee joint line and in the foot      Description: Throbbing and Tingling  Aggravating Factors: bending the knee  Easing Factors: sitting on the sofa with the leg extended and propped up on a pillow    Patients goals: get back to normal, reduce pain, return to walking, return to cooking regularly     Medical History:   Past Medical History:   Diagnosis Date    History of colon polyps     Hyperplastic    Ben Dmitry jaw-winking syndrome     No known health problems     Osteoporosis        Surgical History:   Niya Durbin  has a past surgical history that includes Total abdominal hysterectomy (age 35); Appendectomy (age 10); Colonoscopy (N/A, 9/26/2019); and Open reduction and internal fixation (ORIF) of fracture of tibial plateau (Left, 4/29/2022).    Medications:   Niya has a current medication list which includes the following prescription(s): apixaban, prolia, gabapentin, hydrocodone-acetaminophen, naproxen sodium, and triamcinolone acetonide 0.1%, and the following  "Facility-Administered Medications: sodium chloride 0.9% and sodium chloride 0.9%.    Allergies:   Review of patient's allergies indicates:  No Known Allergies       OBJECTIVE     Observation: pt arrives in w/c with donjoy brace locked in extension and leg propped on leg rest of wheel chair.       Range of Motion (Passive):   Knee Right  Left    Flexion NT 68   Extension NT Lacking 15     Range of Motion (Active):   Knee Right  Left    Flexion 133 63   Extension 0 Lacking 18       Lower Extremity Strength  Right LE  Left LE    Quadriceps: 5/5 Quadriceps: 2-/5   Hamstrings: 4+/5 Hamstrings: 2-/5   Hip flexion (seated): 4+/5 Hip flexion (seated): NT   Hip extension:  NT Hip extension: NT   Hip Abduction: 4-/5 Hip Abduction:  3+/5       Joint Mobility: NT     Palpation: tenderness to palpation surrounding L knee joint    Sensation: NT    Flexibility:    Hamstrings: R = mod limitation ; L = mod limitation    Diane's test: R = NT ; L = NT   Adama test: R = NT ; L = NT    Edema:     Girth Measurement Joint line 15 cm below 10 cm above   Right 34.75 cm 30 cm 36 cm   Left 36 cm 31 cm 35 cm           Limitation/Restriction for FOTO knee Survey    Therapist reviewed FOTO scores for iNya Durbin on 5/18/2022.   FOTO documents entered into [employer] - see Media section.    Limitation Score: NP today%         TREATMENT     Total Treatment time (time-based codes) separate from Evaluation: 10 minutes      Gina received the treatments listed below:      therapeutic exercises to develop strength, endurance, ROM and flexibility for 10 minutes including:  Please perform FOTO at first follow-up visit  Quad sets 30x3" L  SAQ x30 L  SLR x10     Consider:  Heel slides      manual therapy techniques: Joint mobilizations and Soft tissue Mobilization were applied to the: L knee for 0 minutes, including:      neuromuscular re-education activities to improve: Balance, Coordination and Proprioception for 0 minutes. The following activities " were included:      therapeutic activities to improve functional performance for 0  minutes, including:      gait training to improve functional mobility and safety for 0  minutes, including:      Modalities:  Quad NMES     PATIENT EDUCATION AND HOME EXERCISES     Education provided:   - education on condition  - home exercise program    Written Home Exercises Provided: yes. Exercises were reviewed and Gina was able to demonstrate them prior to the end of the session.  Gina demonstrated good  understanding of the education provided. See EMR under Patient Instructions for exercises provided during therapy sessions.    ASSESSMENT     Niya is a 67 y.o. female referred to outpatient Physical Therapy with a medical diagnosis of Closed fracture of proximal end of left tibia with routine healing, unspecified fracture morphology, subsequent encounter [S82.102D]. Patient presents today in a wheel chair and don hector brace locked in extension following a tibial ORIF performed on 4/29/22. She is currently under touch down weightbearing precautions but has difficulty with placing any bit of weight on the foot. Objectively, patient is presenting with significant L knee ROM deficits, difficulty activating the quad even with multiple trials, and tingling in the foot that has been present since just after her surgery was performed. Ms. Fuentes will benefit from skilled PT to address the deficits listed above, for transfer training, for gait training once appropriate, and for LE stretching and strengthening to return to her normal daily functions and regular exercise.     Patient prognosis is Good.   Patient will benefit from skilled outpatient Physical Therapy to address the deficits stated above and in the chart below, provide patient /family education, and to maximize patientt's level of independence.     Plan of care discussed with patient: Yes  Patient's spiritual, cultural and educational needs considered and patient is  agreeable to the plan of care and goals as stated below:     Anticipated Barriers for therapy: scheduling, NWB precautions, pain    Medical Necessity is demonstrated by the following  History  Co-morbidities and personal factors that may impact the plan of care Co-morbidities:   elevated blood pressure, hypothyroidism, osteoporosis, DVT    Personal Factors:   age     low   Examination  Body Structures and Functions, activity limitations and participation restrictions that may impact the plan of care Body Regions:   lower extremities    Body Systems:    gross symmetry  ROM  strength  gross coordinated movement  balance  gait  transfers    Participation Restrictions:   Unable to participate in ADLs     Activity limitations:   Learning and applying knowledge  no deficits    General Tasks and Commands  no deficits    Communication  no deficits    Mobility  lifting and carrying objects  walking  driving (bike, car, motorcycle)    Self care  toileting  dressing    Domestic Life  shopping  cooking  doing house work (cleaning house, washing dishes, laundry)  assisting others    Interactions/Relationships  no deficits    Life Areas  no deficits    Community and Social Life  community life  recreation and leisure         low   Clinical Presentation stable and uncomplicated low   Decision Making/ Complexity Score: low     GOALS: Short Term Goals:  4 weeks  1.Report decreased L knee pain  < / =  4/10  to increase tolerance for therapy exercises  2. Increase knee flexion ROM by 10-20 degrees passively in order to be able to perform ADLs without difficulty.  3. Increase strength by 1/3 MMT grade in LLE  to increase tolerance for ADL and work activities.  4. Pt to tolerate HEP to improve ROM and independence with ADL's  5. Increase knee extension ROM by 5-10 degrees passively to improve gait mechanics when returning to weightbearing    Long Term Goals: 8 weeks  1.Report decreased L knee pain < / = 2/10  to increase tolerance for  ambulation  2.Patient goal: Pt to tolerate 10 minute increments of standing without increase in knee pain in order to return to cooking.   3.Increase strength to >/= 4+/5 in LLE  to increase tolerance for ADL and work activities.  4. Pt will report at CJ level (20-40% impaired) on FOTO knee to demonstrate increase in LE function with every day tasks.   5. Pt to demonstrate L knee AROM equal to R in order to improve gait mechanics for regular exercise      PLAN   Plan of care Certification: 5/18/2022 to 8/18/22.    Outpatient Physical Therapy 2 times weekly for 8 weeks to include the following interventions: Electrical Stimulation  , Gait Training, Manual Therapy, Moist Heat/ Ice, Neuromuscular Re-ed, Patient Education, Therapeutic Activities, Therapeutic Exercise and Ultrasound.     Wandy Lopez, PT      I CERTIFY THE NEED FOR THESE SERVICES FURNISHED UNDER THIS PLAN OF TREATMENT AND WHILE UNDER MY CARE   Physician's comments:     Physician's Signature: ___________________________________________________

## 2022-05-21 ENCOUNTER — PATIENT MESSAGE (OUTPATIENT)
Dept: ORTHOPEDICS | Facility: CLINIC | Age: 67
End: 2022-05-21
Payer: MEDICARE

## 2022-05-22 ENCOUNTER — PATIENT MESSAGE (OUTPATIENT)
Dept: ORTHOPEDICS | Facility: CLINIC | Age: 67
End: 2022-05-22
Payer: MEDICARE

## 2022-05-24 ENCOUNTER — PATIENT MESSAGE (OUTPATIENT)
Dept: ORTHOPEDICS | Facility: CLINIC | Age: 67
End: 2022-05-24
Payer: MEDICARE

## 2022-05-24 DIAGNOSIS — S82.102D CLOSED FRACTURE OF PROXIMAL END OF LEFT TIBIA WITH ROUTINE HEALING, UNSPECIFIED FRACTURE MORPHOLOGY, SUBSEQUENT ENCOUNTER: Primary | ICD-10-CM

## 2022-05-24 RX ORDER — HYDROCODONE BITARTRATE AND ACETAMINOPHEN 5; 325 MG/1; MG/1
1 TABLET ORAL EVERY 8 HOURS PRN
Qty: 21 TABLET | Refills: 0 | Status: SHIPPED | OUTPATIENT
Start: 2022-05-24 | End: 2022-06-09

## 2022-05-24 NOTE — PROGRESS NOTES
"  [facility]   Physical Therapy Treatment Note     Name: Niya Soloriowald  Clinic Number: [id number]    Therapy Diagnosis:   Encounter Diagnoses   Name Primary?    Decreased range of motion (ROM) of left knee Yes    Muscle weakness of lower extremity      Physician: Martin Cleary NP    Visit Date: 5/25/2022    Physician Orders: PT Eval and Treat   Medical Diagnosis from Referral: Closed fracture of proximal end of left tibia with routine healing, unspecified fracture morphology, subsequent encounter [S82.102D]  Evaluation Date: 5/18/2022  Authorization Period Expiration:12/31/22  Plan of Care Expiration: 8/18/22  Progress Note Due: 6/18/22  Visit # / Visits authorized: 1/ 40  FOTO: 1/3     PTA Visit #: 0/5     Precautions: Standard and toe touch weight bearing, DVT, osteoporosis  PROCEDURE:              Open reduction internal fixation left tibial plateau fracture  Foot flat touchdown weight-bearing left lower extremity x6 weeks (until 7/10/22)  Range of motion as tolerated left lower extremity  Will place her into a hinged knee brace. Lock in extension during transfers only.     Time In:745 AM  Time Out: 825 AM  Total Appointment Time (timed & untimed codes): 40 minutes (TE-3)       SUBJECTIVE     Pt reports: biggest complaint is the tingling in the foot. She was prescribed hydrocodone which is barely helping. She is taking the muscle relaxer and gabapentin and eloquis.   She was compliant with home exercise program.  Response to previous treatment: katrina eval well  Functional change: none    Pain: 6-7/10  Location: left foot and around the incision    OBJECTIVE     FOTO performed today.       Arrives to session in wheelchair. She has left her brace at home.    Treatment     Gina received the treatments listed below:         therapeutic exercises to develop strength, endurance, ROM and flexibility for 40 minutes including:      Quad sets 2x10 with 5" L  SAQ 3x10 over full foam " "roll L  SLR 2x5 (with PT assistance to avoid quad lag)   Heel slides 3 mins with 3" holds (supine AAROM)  Supine heel prop 3 mins             Patient Education and Home Exercises     Home Exercises Provided and Patient Education Provided     Education provided:   - pt educated on all interventions performed at today's visit    Written Home Exercises Provided: Patient instructed to cont prior HEP. Exercises were reviewed and Gina was able to demonstrate them prior to the end of the session.  Gina demonstrated good  understanding of the education provided. See EMR under Patient Instructions for exercises provided during therapy sessions    ASSESSMENT     Pt with good tolerance to first session after initial PT evaluation. She continues with impaired left quad facilitation and left knee ROM therefore these are the focus of today's session. Educated on transfers from wheelchair <> mat. Progress as katrina.     Gina Is progressing well towards her goals.   Pt prognosis is Good.     Pt will continue to benefit from skilled outpatient physical therapy to address the deficits listed in the problem list box on initial evaluation, provide pt/family education and to maximize pt's level of independence in the home and community environment.     Pt's spiritual, cultural and educational needs considered and pt agreeable to plan of care and goals.     Anticipated barriers to physical therapy: scheduling, NWB precautions, pain    Goals:     Short Term Goals:  4 weeks  1.Report decreased L knee pain  < / =  4/10  to increase tolerance for therapy exercises  2. Increase knee flexion ROM by 10-20 degrees passively in order to be able to perform ADLs without difficulty.  3. Increase strength by 1/3 MMT grade in LLE  to increase tolerance for ADL and work activities.  4. Pt to tolerate HEP to improve ROM and independence with ADL's  5. Increase knee extension ROM by 5-10 degrees passively to improve gait mechanics when returning to " weightbearing     Long Term Goals: 8 weeks  1.Report decreased L knee pain < / = 2/10  to increase tolerance for ambulation  2.Patient goal: Pt to tolerate 10 minute increments of standing without increase in knee pain in order to return to cooking.   3.Increase strength to >/= 4+/5 in LLE  to increase tolerance for ADL and work activities.  4. Pt will report at CJ level (20-40% impaired) on FOTO knee to demonstrate increase in LE function with every day tasks.   5. Pt to demonstrate L knee AROM equal to R in order to improve gait mechanics for regular exercise          PLAN     Continue PT POC    Bianca Nunez, PT

## 2022-05-25 ENCOUNTER — CLINICAL SUPPORT (OUTPATIENT)
Dept: REHABILITATION | Facility: HOSPITAL | Age: 67
End: 2022-05-25
Payer: MEDICARE

## 2022-05-25 DIAGNOSIS — M62.81 MUSCLE WEAKNESS OF LOWER EXTREMITY: ICD-10-CM

## 2022-05-25 DIAGNOSIS — M25.662 DECREASED RANGE OF MOTION (ROM) OF LEFT KNEE: Primary | ICD-10-CM

## 2022-05-25 PROCEDURE — 97110 THERAPEUTIC EXERCISES: CPT | Mod: PO

## 2022-05-31 ENCOUNTER — CLINICAL SUPPORT (OUTPATIENT)
Dept: REHABILITATION | Facility: HOSPITAL | Age: 67
End: 2022-05-31
Payer: MEDICARE

## 2022-05-31 DIAGNOSIS — M62.81 MUSCLE WEAKNESS OF LOWER EXTREMITY: ICD-10-CM

## 2022-05-31 DIAGNOSIS — M25.662 DECREASED RANGE OF MOTION (ROM) OF LEFT KNEE: ICD-10-CM

## 2022-05-31 DIAGNOSIS — S82.102D CLOSED FRACTURE OF PROXIMAL END OF LEFT TIBIA WITH ROUTINE HEALING, UNSPECIFIED FRACTURE MORPHOLOGY, SUBSEQUENT ENCOUNTER: Primary | ICD-10-CM

## 2022-05-31 PROCEDURE — 97110 THERAPEUTIC EXERCISES: CPT | Mod: PO,CQ

## 2022-05-31 NOTE — PROGRESS NOTES
[facility]   Physical Therapy Treatment Note     [id number]    Therapy Diagnosis:   Encounter Diagnoses   Name Primary?    Closed fracture of proximal end of left tibia with routine healing, unspecified fracture morphology, subsequent encounter Yes    Decreased range of motion (ROM) of left knee     Muscle weakness of lower extremity      Physician: Martin Cleary NP    Visit Date: 5/31/2022    Physician Orders: PT Eval and Treat   Medical Diagnosis from Referral: Closed fracture of proximal end of left tibia with routine healing, unspecified fracture morphology, subsequent encounter [S82.102D]  Evaluation Date: 5/18/2022  Authorization Period Expiration:12/31/22  Plan of Care Expiration: 8/18/22  Progress Note Due: 6/18/22  Visit # / Visits authorized: 2/ 40  FOTO: 1/3     PTA Visit #: 1/5     Precautions: Standard and toe touch weight bearing, DVT, osteoporosis  PROCEDURE:              Open reduction internal fixation left tibial plateau fracture  Foot flat touchdown weight-bearing left lower extremity x6 weeks (until 7/10/22)  Range of motion as tolerated left lower extremity  Will place her into a hinged knee brace. Lock in extension during transfers only.     Time In:745 AM  Time Out: 825 AM  Total Appointment Time (timed & untimed codes): 40 minutes (TE-3)       SUBJECTIVE     Pt reports: her leg is feeling a little bit better and that she isn't experiencing as much tingling in her L foot.   She was compliant with home exercise program.  Response to previous treatment: Felt fine   Functional change: none noted     Pain: 5/10  Location: L leg     OBJECTIVE     FOTO performed today.       Arrives to session in wheelchair. She has left her brace at home.    Treatment     Gina received the treatments listed below:         therapeutic exercises to develop strength, endurance, ROM and flexibility for 40 minutes including:      Quad sets  "2x10 with 5" L  Seated calf stretch with strap 5 x 30"   SAQ 3x10 over full foam roll L  Supine add sqz's 2 x 10   SLR 2x5 (with PT assistance to avoid quad lag)   Heel slides 3 mins with 3" holds (supine AAROM)  Supine heel prop 3 mins             Patient Education and Home Exercises     Home Exercises Provided and Patient Education Provided     Education provided:   - pt educated on all interventions performed at today's visit    Written Home Exercises Provided: Patient instructed to cont prior HEP. Exercises were reviewed and Gina was able to demonstrate them prior to the end of the session.  Gina demonstrated good  understanding of the education provided. See EMR under Patient Instructions for exercises provided during therapy sessions    ASSESSMENT     Pt with improved eccentric control during SLR's as she was able to perform them without active assistance.  Pt with no pain post treatment session.  Will continue to monitor and progress pt within her tolerance.     Gina Is progressing well towards her goals.   Pt prognosis is Good.     Pt will continue to benefit from skilled outpatient physical therapy to address the deficits listed in the problem list box on initial evaluation, provide pt/family education and to maximize pt's level of independence in the home and community environment.     Pt's spiritual, cultural and educational needs considered and pt agreeable to plan of care and goals.     Anticipated barriers to physical therapy: scheduling, NWB precautions, pain    Goals:     Short Term Goals:  4 weeks  1.Report decreased L knee pain  < / =  4/10  to increase tolerance for therapy exercises  2. Increase knee flexion ROM by 10-20 degrees passively in order to be able to perform ADLs without difficulty.  3. Increase strength by 1/3 MMT grade in LLE  to increase tolerance for ADL and work activities.  4. Pt to tolerate HEP to improve ROM and independence with ADL's  5. Increase knee extension ROM by 5-10 " degrees passively to improve gait mechanics when returning to weightbearing     Long Term Goals: 8 weeks  1.Report decreased L knee pain < / = 2/10  to increase tolerance for ambulation  2.Patient goal: Pt to tolerate 10 minute increments of standing without increase in knee pain in order to return to cooking.   3.Increase strength to >/= 4+/5 in LLE  to increase tolerance for ADL and work activities.  4. Pt will report at CJ level (20-40% impaired) on FOTO knee to demonstrate increase in LE function with every day tasks.   5. Pt to demonstrate L knee AROM equal to R in order to improve gait mechanics for regular exercise          PLAN     Continue PT POC    Ricardo Carrillo, PTA

## 2022-06-02 ENCOUNTER — CLINICAL SUPPORT (OUTPATIENT)
Dept: REHABILITATION | Facility: HOSPITAL | Age: 67
End: 2022-06-02
Payer: MEDICARE

## 2022-06-02 DIAGNOSIS — M62.81 MUSCLE WEAKNESS OF LOWER EXTREMITY: ICD-10-CM

## 2022-06-02 DIAGNOSIS — S82.102D CLOSED FRACTURE OF PROXIMAL END OF LEFT TIBIA WITH ROUTINE HEALING, UNSPECIFIED FRACTURE MORPHOLOGY, SUBSEQUENT ENCOUNTER: Primary | ICD-10-CM

## 2022-06-02 DIAGNOSIS — M25.662 DECREASED RANGE OF MOTION (ROM) OF LEFT KNEE: ICD-10-CM

## 2022-06-02 PROCEDURE — 97110 THERAPEUTIC EXERCISES: CPT | Mod: PO,CQ

## 2022-06-02 NOTE — PROGRESS NOTES
"  [facility]   Physical Therapy Treatment Note     Name: Niya Soloriowald  Clinic Number: [id number]    Therapy Diagnosis:   Encounter Diagnoses   Name Primary?    Closed fracture of proximal end of left tibia with routine healing, unspecified fracture morphology, subsequent encounter Yes    Decreased range of motion (ROM) of left knee     Muscle weakness of lower extremity      Physician: Martin Cleary NP    Visit Date: 6/2/2022    Physician Orders: PT Eval and Treat   Medical Diagnosis from Referral: Closed fracture of proximal end of left tibia with routine healing, unspecified fracture morphology, subsequent encounter [S82.102D]  Evaluation Date: 5/18/2022  Authorization Period Expiration:12/31/22  Plan of Care Expiration: 8/18/22  Progress Note Due: 6/18/22  Visit # / Visits authorized: 2/ 40  FOTO: 1/3     PTA Visit #: 2/5     Precautions: Standard and toe touch weight bearing, DVT, osteoporosis  PROCEDURE:              Open reduction internal fixation left tibial plateau fracture  Foot flat touchdown weight-bearing left lower extremity x6 weeks (until 7/10/22)  Range of motion as tolerated left lower extremity  Will place her into a hinged knee brace. Lock in extension during transfers only.     Time In:745 AM  Time Out: 825 AM  Total Appointment Time (timed & untimed codes): 40 minutes (TE-3)       SUBJECTIVE     Pt reports: no new issues or concerns at this time.   She was compliant with home exercise program.  Response to previous treatment: Felt fine   Functional change: none noted     Pain: not rated/10  Location: L leg     OBJECTIVE     FOTO performed today.       Arrives to session in wheelchair. She has left her brace at home.    Treatment     Gina received the treatments listed below:         therapeutic exercises to develop strength, endurance, ROM and flexibility for 40 minutes including:    Seated calf stretch with strap 5 x 30"   Seated long sitting HSS " "with strap 3 x 30"   Supine heel prop 3 mins   Quad sets 2x10 with 5" L  SAQ 3x10 over full foam roll L  Supine add sqz's 2 x 10   SLR 3x5   Heel slides 3 mins with 3" holds (supine AAROM)              Patient Education and Home Exercises     Home Exercises Provided and Patient Education Provided     Education provided:   - pt educated on all interventions performed at today's visit    Written Home Exercises Provided: Patient instructed to cont prior HEP. Exercises were reviewed and Gina was able to demonstrate them prior to the end of the session.  Gina demonstrated good  understanding of the education provided. See EMR under Patient Instructions for exercises provided during therapy sessions    ASSESSMENT     Pt was able to increase her repetitions on SLR's and was able to maintain appropriate exercise form.  Pt with no pain post treatment session.  Seated calf stretching was reviewed with patient for her to start performing at home due to pt with limited DF AROM secondary to calf tightness.  Will continue to monitor and progress pt within her tolerance.     Gina Is progressing well towards her goals.   Pt prognosis is Good.     Pt will continue to benefit from skilled outpatient physical therapy to address the deficits listed in the problem list box on initial evaluation, provide pt/family education and to maximize pt's level of independence in the home and community environment.     Pt's spiritual, cultural and educational needs considered and pt agreeable to plan of care and goals.     Anticipated barriers to physical therapy: scheduling, NWB precautions, pain    Goals:     Short Term Goals:  4 weeks  1.Report decreased L knee pain  < / =  4/10  to increase tolerance for therapy exercises  2. Increase knee flexion ROM by 10-20 degrees passively in order to be able to perform ADLs without difficulty.  3. Increase strength by 1/3 MMT grade in LLE  to increase tolerance for ADL and work activities.  4. Pt to " tolerate HEP to improve ROM and independence with ADL's  5. Increase knee extension ROM by 5-10 degrees passively to improve gait mechanics when returning to weightbearing     Long Term Goals: 8 weeks  1.Report decreased L knee pain < / = 2/10  to increase tolerance for ambulation  2.Patient goal: Pt to tolerate 10 minute increments of standing without increase in knee pain in order to return to cooking.   3.Increase strength to >/= 4+/5 in LLE  to increase tolerance for ADL and work activities.  4. Pt will report at CJ level (20-40% impaired) on FOTO knee to demonstrate increase in LE function with every day tasks.   5. Pt to demonstrate L knee AROM equal to R in order to improve gait mechanics for regular exercise          PLAN     Continue PT POC    Ricardo Carrillo, PTA

## 2022-06-07 ENCOUNTER — CLINICAL SUPPORT (OUTPATIENT)
Dept: REHABILITATION | Facility: HOSPITAL | Age: 67
End: 2022-06-07
Payer: MEDICARE

## 2022-06-07 DIAGNOSIS — M62.81 MUSCLE WEAKNESS OF LOWER EXTREMITY: ICD-10-CM

## 2022-06-07 DIAGNOSIS — M25.662 DECREASED RANGE OF MOTION (ROM) OF LEFT KNEE: ICD-10-CM

## 2022-06-07 DIAGNOSIS — S82.102D CLOSED FRACTURE OF PROXIMAL END OF LEFT TIBIA WITH ROUTINE HEALING, UNSPECIFIED FRACTURE MORPHOLOGY, SUBSEQUENT ENCOUNTER: Primary | ICD-10-CM

## 2022-06-07 PROCEDURE — 97110 THERAPEUTIC EXERCISES: CPT | Mod: PO,CQ

## 2022-06-07 NOTE — PROGRESS NOTES
"  [facility]   Physical Therapy Treatment Note     Name: Niya Soloriowald  Clinic Number: [id number]    Therapy Diagnosis:   Encounter Diagnoses   Name Primary?    Closed fracture of proximal end of left tibia with routine healing, unspecified fracture morphology, subsequent encounter Yes    Decreased range of motion (ROM) of left knee     Muscle weakness of lower extremity      Physician: Martin Cleary NP    Visit Date: 6/7/2022    Physician Orders: PT Eval and Treat   Medical Diagnosis from Referral: Closed fracture of proximal end of left tibia with routine healing, unspecified fracture morphology, subsequent encounter [S82.102D]  Evaluation Date: 5/18/2022  Authorization Period Expiration:12/31/22  Plan of Care Expiration: 8/18/22  Progress Note Due: 6/18/22  Visit # / Visits authorized: 4/40  FOTO: 1/3     PTA Visit #: 3/5     Precautions: Standard and toe touch weight bearing, DVT, osteoporosis  PROCEDURE:              Open reduction internal fixation left tibial plateau fracture  Foot flat touchdown weight-bearing left lower extremity x6 weeks (until 7/10/22)  Range of motion as tolerated left lower extremity  Will place her into a hinged knee brace. Lock in extension during transfers only.     Time In:745 AM  Time Out: 825 AM  Total Appointment Time (timed & untimed codes): 40 minutes (TE-3)       SUBJECTIVE     Pt reports: no new issues or concerns at this time.   She was compliant with home exercise program.  Response to previous treatment: Felt fine   Functional change: none noted     Pain: not rated/10  Location: L leg     OBJECTIVE     FOTO performed today.       Arrives to session in wheelchair. She has left her brace at home.    Treatment     Gina received the treatments listed below:         therapeutic exercises to develop strength, endurance, ROM and flexibility for 40 minutes including:    Seated calf stretch with strap 5 x 30"   Seated long sitting HSS with " "strap 5 x 30"   Supine heel prop 3 mins   Quad sets 2x10 with 5" L  SAQ 3x10 over full foam roll L  Supine add sqz's 2 x 10   Supine clamshells 2 x 10 YTB   SLR 3x5   Heel slides 3 mins with 3" holds (supine AAROM)              Patient Education and Home Exercises     Home Exercises Provided and Patient Education Provided     Education provided:   - pt educated on all interventions performed at today's visit    Written Home Exercises Provided: Patient instructed to cont prior HEP. Exercises were reviewed and Gina was able to demonstrate them prior to the end of the session.  Gina demonstrated good  understanding of the education provided. See EMR under Patient Instructions for exercises provided during therapy sessions    ASSESSMENT     Pt with no pain post treatment session. Pt continue's to show gradual improvements in her L knee flexion PROM.  Will continue to monitor and progress pt within her tolerance.     Gina Is progressing well towards her goals.   Pt prognosis is Good.     Pt will continue to benefit from skilled outpatient physical therapy to address the deficits listed in the problem list box on initial evaluation, provide pt/family education and to maximize pt's level of independence in the home and community environment.     Pt's spiritual, cultural and educational needs considered and pt agreeable to plan of care and goals.     Anticipated barriers to physical therapy: scheduling, NWB precautions, pain    Goals:     Short Term Goals:  4 weeks  1.Report decreased L knee pain  < / =  4/10  to increase tolerance for therapy exercises  2. Increase knee flexion ROM by 10-20 degrees passively in order to be able to perform ADLs without difficulty.  3. Increase strength by 1/3 MMT grade in LLE  to increase tolerance for ADL and work activities.  4. Pt to tolerate HEP to improve ROM and independence with ADL's  5. Increase knee extension ROM by 5-10 degrees passively to improve gait mechanics when " returning to weightbearing     Long Term Goals: 8 weeks  1.Report decreased L knee pain < / = 2/10  to increase tolerance for ambulation  2.Patient goal: Pt to tolerate 10 minute increments of standing without increase in knee pain in order to return to cooking.   3.Increase strength to >/= 4+/5 in LLE  to increase tolerance for ADL and work activities.  4. Pt will report at CJ level (20-40% impaired) on FOTO knee to demonstrate increase in LE function with every day tasks.   5. Pt to demonstrate L knee AROM equal to R in order to improve gait mechanics for regular exercise          PLAN     Continue PT POC    Ricardo Carrillo, PTA

## 2022-06-09 ENCOUNTER — PATIENT MESSAGE (OUTPATIENT)
Dept: ORTHOPEDICS | Facility: CLINIC | Age: 67
End: 2022-06-09

## 2022-06-09 ENCOUNTER — OFFICE VISIT (OUTPATIENT)
Dept: ORTHOPEDICS | Facility: CLINIC | Age: 67
End: 2022-06-09
Payer: MEDICARE

## 2022-06-09 ENCOUNTER — HOSPITAL ENCOUNTER (OUTPATIENT)
Dept: RADIOLOGY | Facility: HOSPITAL | Age: 67
Discharge: HOME | End: 2022-06-09
Attending: NURSE PRACTITIONER
Payer: MEDICARE

## 2022-06-09 ENCOUNTER — TELEPHONE (OUTPATIENT)
Dept: CARDIOLOGY | Facility: CLINIC | Age: 67
End: 2022-06-09

## 2022-06-09 VITALS — BODY MASS INDEX: 21.67 KG/M2 | WEIGHT: 130.06 LBS | HEIGHT: 65 IN

## 2022-06-09 DIAGNOSIS — S82.102D CLOSED FRACTURE OF PROXIMAL END OF LEFT TIBIA WITH ROUTINE HEALING, UNSPECIFIED FRACTURE MORPHOLOGY, SUBSEQUENT ENCOUNTER: Primary | ICD-10-CM

## 2022-06-09 DIAGNOSIS — S82.832A CLOSED FRACTURE OF PROXIMAL END OF LEFT FIBULA, UNSPECIFIED FRACTURE MORPHOLOGY, INITIAL ENCOUNTER: ICD-10-CM

## 2022-06-09 DIAGNOSIS — M25.562 ACUTE PAIN OF LEFT KNEE: Primary | ICD-10-CM

## 2022-06-09 DIAGNOSIS — M25.562 ACUTE PAIN OF LEFT KNEE: ICD-10-CM

## 2022-06-09 DIAGNOSIS — I82.452 ACUTE DEEP VEIN THROMBOSIS (DVT) OF LEFT PERONEAL VEIN: ICD-10-CM

## 2022-06-09 DIAGNOSIS — S82.102A CLOSED FRACTURE OF PROXIMAL END OF LEFT TIBIA, UNSPECIFIED FRACTURE MORPHOLOGY, INITIAL ENCOUNTER: ICD-10-CM

## 2022-06-09 DIAGNOSIS — Z98.890 POST-OPERATIVE STATE: ICD-10-CM

## 2022-06-09 PROCEDURE — 1101F PR PT FALLS ASSESS DOC 0-1 FALLS W/OUT INJ PAST YR: ICD-10-PCS | Mod: CPTII,S$GLB,, | Performed by: NURSE PRACTITIONER

## 2022-06-09 PROCEDURE — 73560 X-RAY EXAM OF KNEE 1 OR 2: CPT | Mod: 26,LT,, | Performed by: RADIOLOGY

## 2022-06-09 PROCEDURE — 73560 X-RAY EXAM OF KNEE 1 OR 2: CPT | Mod: TC,LT

## 2022-06-09 PROCEDURE — 3288F PR FALLS RISK ASSESSMENT DOCUMENTED: ICD-10-PCS | Mod: CPTII,S$GLB,, | Performed by: NURSE PRACTITIONER

## 2022-06-09 PROCEDURE — 3008F BODY MASS INDEX DOCD: CPT | Mod: CPTII,S$GLB,, | Performed by: NURSE PRACTITIONER

## 2022-06-09 PROCEDURE — 99024 PR POST-OP FOLLOW-UP VISIT: ICD-10-PCS | Mod: S$GLB,,, | Performed by: NURSE PRACTITIONER

## 2022-06-09 PROCEDURE — 3288F FALL RISK ASSESSMENT DOCD: CPT | Mod: CPTII,S$GLB,, | Performed by: NURSE PRACTITIONER

## 2022-06-09 PROCEDURE — 1159F MED LIST DOCD IN RCRD: CPT | Mod: CPTII,S$GLB,, | Performed by: NURSE PRACTITIONER

## 2022-06-09 PROCEDURE — 73560 XR KNEE 1 OR 2 VIEW LEFT: ICD-10-PCS | Mod: 26,LT,, | Performed by: RADIOLOGY

## 2022-06-09 PROCEDURE — 1159F PR MEDICATION LIST DOCUMENTED IN MEDICAL RECORD: ICD-10-PCS | Mod: CPTII,S$GLB,, | Performed by: NURSE PRACTITIONER

## 2022-06-09 PROCEDURE — 1101F PT FALLS ASSESS-DOCD LE1/YR: CPT | Mod: CPTII,S$GLB,, | Performed by: NURSE PRACTITIONER

## 2022-06-09 PROCEDURE — 99999 PR PBB SHADOW E&M-EST. PATIENT-LVL III: ICD-10-PCS | Mod: PBBFAC,,, | Performed by: NURSE PRACTITIONER

## 2022-06-09 PROCEDURE — 1160F RVW MEDS BY RX/DR IN RCRD: CPT | Mod: CPTII,S$GLB,, | Performed by: NURSE PRACTITIONER

## 2022-06-09 PROCEDURE — 1126F AMNT PAIN NOTED NONE PRSNT: CPT | Mod: CPTII,S$GLB,, | Performed by: NURSE PRACTITIONER

## 2022-06-09 PROCEDURE — 1160F PR REVIEW ALL MEDS BY PRESCRIBER/CLIN PHARMACIST DOCUMENTED: ICD-10-PCS | Mod: CPTII,S$GLB,, | Performed by: NURSE PRACTITIONER

## 2022-06-09 PROCEDURE — 1126F PR PAIN SEVERITY QUANTIFIED, NO PAIN PRESENT: ICD-10-PCS | Mod: CPTII,S$GLB,, | Performed by: NURSE PRACTITIONER

## 2022-06-09 PROCEDURE — 99999 PR PBB SHADOW E&M-EST. PATIENT-LVL III: CPT | Mod: PBBFAC,,, | Performed by: NURSE PRACTITIONER

## 2022-06-09 PROCEDURE — 99024 POSTOP FOLLOW-UP VISIT: CPT | Mod: S$GLB,,, | Performed by: NURSE PRACTITIONER

## 2022-06-09 PROCEDURE — 3008F PR BODY MASS INDEX (BMI) DOCUMENTED: ICD-10-PCS | Mod: CPTII,S$GLB,, | Performed by: NURSE PRACTITIONER

## 2022-06-09 RX ORDER — GABAPENTIN 100 MG/1
200 CAPSULE ORAL 2 TIMES DAILY
Qty: 90 CAPSULE | Refills: 0
Start: 2022-06-09 | End: 2022-06-16

## 2022-06-09 NOTE — TELEPHONE ENCOUNTER
----- Message from Thee Law MD PhD sent at 6/9/2022 12:48 PM CDT -----  Regarding: FW: DVT  Please schedule pt to see me in clinic.      Thank you  ----- Message -----  From: Martin Cleary NP  Sent: 6/8/2022   9:43 PM CDT  To: Thee Law MD PhD  Subject: DVT                                              Dr. Law,    This patient was found to have a DVT s/p left tibial plateau fracture surgery on 5/13/22.  She was started on Eliquis 10 mg bid x 1 week followed by 5 mg bid thereafter per your recommendations.  Is it possible to get her an appointment with you for management of her DVT?  She is coming to the clinic with me on 6/9/22.    Thanks    Martin

## 2022-06-09 NOTE — PROGRESS NOTES
Ms. Durbin is here today for a post-operative visit after undergoing an ORIF for her left tibial plateau fracture by Dr. James on 4/29/2022.    Interval History:  She reports that she is better, still dealing with tingling sensation to her foot.  She is using Neurontin qhs which helped at night but still has the sensation during the day.  Pain is tolerable.  She is not taking pain medication.  She is Tylenol alone.  She is taking her Eliquis as instructed.  She has not heard from Cardiology.  She feels her leg swelling is improving.  She denies fever, chills, and sweats since the time of the surgery.     Physical exam:  The incision to the left leg is open air and healed.  There is no signs and symptoms of infection.   She has tactile stimulation to their lower leg, she reports calf pain, there is trace leg edema and their pedal pulse is palpable x 2.   ROM of knee is 0-90 degrees.    RADS: Left knee x-ray was obtained and personally reviewed by me.  Her lateral side place and screw fixation appears to be in good position, no evidence of hardware failure.  Fracture is stable, no new fractures seen.    Assessment:  Post-op visit (6 weeks)    Plan:    ICD-10-CM ICD-9-CM   1. Closed fracture of proximal end of left tibia with routine healing, unspecified fracture morphology, subsequent encounter  S82.102D V54.16   2. Acute deep vein thrombosis (DVT) of left peroneal vein  I82.452 453.42   3. Post-operative state  Z98.890 V45.89     Current care, treatment plan, precautions, activity level/ modifications, limitations, rehabilitation exercises and proposed future treatment were discussed with the patient. We discussed the need to monitor for changes in symptoms and condition and report them to the physician.  Discussed importance of compliance with all appointments and follow up examinations.     WOUND CARE ORDERS  - If there are signs of infection, please call [facility] [phone] for further instructions and  to make appt to be seen.       PHYSICAL THERAPY:   - Continue therapy as ordered.  - Weight bearing, I will Barry her weight-bearing to weight bear as tolerated on hinged knee brace locked in extension for the 1st 2 weeks and then advance range of motion 0-30 degrees x2 weeks and increase by 30° every 2 weeks thereafter until patient reaches 90° flexion and is able to bear weight.  Once she is at 90° she can discontinue the hinged knee brace.  - Range of motion as tolerated.     OTHER:  - I messaged cardiology to trying get her an appointment with Dr. Law.     PAIN MEDICATION:   - Pain medication: refill was not needed, recommend increase in Neurontin to 200 mg twice a day.  - Pain medication refill policy provided to patient for review, yes.    - Patient was informed a multi-modal approach is used to treat their pain.     DVT PROPHYLAXIS:   - Eliquis 5 mg twice a day      FOLLOW UP:   - Patient will follow up in the clinic in 6 weeks.  - X-ray of her left knee non standing is needed.    - Future Appointments:   Future Appointments   Date Time Provider Department Center   6/14/2022  7:45 AM Ricardo Carrillo, PTA [facility]   6/17/2022  8:15 AM Ricardo Carrillo, PTA [facility]   6/22/2022  9:45 AM Wandy Lopez, PT [facility]   6/24/2022  8:15 AM Wandy John, PT [facility]   6/28/2022  7:45 AM Ricardo Carrillo, PTA [facility]   7/1/2022  8:15 AM Wandy Morseby, PT [facility]   7/5/2022  7:45 AM Ricardo Carrillo, PTA [facility]   7/8/2022  8:15 AM Wandy Lopez, PT [facility]   7/12/2022  7:45 AM Ricardo Carrillo, PTA [facility]   7/15/2022  8:15 AM Wandy Lopez, PT [facility]   7/21/2022 10:45 AM Martin Cleary NP [facility] [address]   10/21/2022  1:45 PM INJECTION [facility]           If there are any questions prior to scheduled follow up, the patient was instructed to  contact the office

## 2022-06-14 ENCOUNTER — CLINICAL SUPPORT (OUTPATIENT)
Dept: REHABILITATION | Facility: HOSPITAL | Age: 67
End: 2022-06-14
Payer: MEDICARE

## 2022-06-14 DIAGNOSIS — S82.102D CLOSED FRACTURE OF PROXIMAL END OF LEFT TIBIA WITH ROUTINE HEALING, UNSPECIFIED FRACTURE MORPHOLOGY, SUBSEQUENT ENCOUNTER: Primary | ICD-10-CM

## 2022-06-14 DIAGNOSIS — M25.662 DECREASED RANGE OF MOTION (ROM) OF LEFT KNEE: ICD-10-CM

## 2022-06-14 DIAGNOSIS — M62.81 MUSCLE WEAKNESS OF LOWER EXTREMITY: ICD-10-CM

## 2022-06-14 PROCEDURE — 97116 GAIT TRAINING THERAPY: CPT | Mod: PO,CQ

## 2022-06-14 PROCEDURE — 97110 THERAPEUTIC EXERCISES: CPT | Mod: PO,CQ

## 2022-06-14 PROCEDURE — 97140 MANUAL THERAPY 1/> REGIONS: CPT | Mod: PO,CQ

## 2022-06-14 NOTE — PROGRESS NOTES
[facility]   Physical Therapy Treatment Note     [id number]    Therapy Diagnosis:   Encounter Diagnoses   Name Primary?    Closed fracture of proximal end of left tibia with routine healing, unspecified fracture morphology, subsequent encounter Yes    Decreased range of motion (ROM) of left knee     Muscle weakness of lower extremity      Physician: Martin Cleary NP    Visit Date: 6/14/2022    Physician Orders: PT Eval and Treat   Medical Diagnosis from Referral: Closed fracture of proximal end of left tibia with routine healing, unspecified fracture morphology, subsequent encounter [S82.102D]  Evaluation Date: 5/18/2022  Authorization Period Expiration:12/31/22  Plan of Care Expiration: 8/18/22  Progress Note Due: 6/18/22  Visit # / Visits authorized: 5/40  FOTO: 1/3     PTA Visit #: 4/5     Precautions: Standard and toe touch weight bearing, DVT, osteoporosis  PROCEDURE:              Open reduction internal fixation left tibial plateau fracture  Foot flat touchdown weight-bearing left lower extremity x6 weeks (until 7/10/22)  Range of motion as tolerated left lower extremity  Will place her into a hinged knee brace. Lock in extension during transfers only.     Time In:745 AM  Time Out: 825 AM  Total Appointment Time (timed & untimed codes): 40 minutes (TE-3)       SUBJECTIVE     Pt reports:  She is feeling better and that her MD released her to begin weight bearing activities on her L LE with hinged knee brace on.   She was compliant with home exercise program.  Response to previous treatment: Felt fine   Functional change: none noted     Pain: not rated/10  Location: L leg     OBJECTIVE     MD note on 6/9/2022    - Continue therapy as ordered.  - Weight bearing, I will Barry her weight-bearing to weight bear as tolerated on hinged knee brace locked in extension for the 1st 2 weeks and then advance range of motion 0-30 degrees x2 weeks  "and increase by 30° every 2 weeks thereafter until patient reaches 90° flexion and is able to bear weight.  Once she is at 90° she can discontinue the hinged knee brace.  - Range of motion as tolerated.      0 degrees of flexion locked in extension until 6/28  unlocked 30 degrees of flexion 6/28-7/12  Unlocked 60 degrees of flexion 7/12- 7/26  Unlocked 90 degress of flexion 7/26- 8/9     D/C hinged brace after 8/9     Treatment     Gina received the treatments listed below:         therapeutic exercises to develop strength, endurance, ROM and flexibility for 35 minutes including:    Seated calf stretch with strap 5 x 30"   Seated long sitting HSS with strap 5 x 30"   Supine heel prop 3 mins   Quad sets 2x10 with 3" L  SAQ 3x10 over full foam roll L  Supine add sqz's 2 x 10   Supine clamshells 2 x 10 YTB   SLR 2x10   Heel slides 3 mins with 3" holds (supine AAROM)    Gait training 10' with RW on level surface to work on restoring gait mechanics as well as facilitating weight bearing through L LE since pt was cleared by MD to begin weight bearing.           Patient Education and Home Exercises     Home Exercises Provided and Patient Education Provided     Education provided:   - pt educated on all interventions performed at today's visit    Written Home Exercises Provided: Patient instructed to cont prior HEP. Exercises were reviewed and Gina was able to demonstrate them prior to the end of the session.  Gina demonstrated good  understanding of the education provided. See EMR under Patient Instructions for exercises provided during therapy sessions    ASSESSMENT      Pt began weight bearing activities this morning since she was cleared by MD to begin weight bearing with her brace locked in extension.  Pt required verbal cueing on proper anterior weight shift as well as appropriate gait mechanics to work on her heel strike.  Will continue to monitor and progress pt within her tolerance.     Gina Is progressing well " towards her goals.   Pt prognosis is Good.     Pt will continue to benefit from skilled outpatient physical therapy to address the deficits listed in the problem list box on initial evaluation, provide pt/family education and to maximize pt's level of independence in the home and community environment.     Pt's spiritual, cultural and educational needs considered and pt agreeable to plan of care and goals.     Anticipated barriers to physical therapy: scheduling, NWB precautions, pain    Goals:     Short Term Goals:  4 weeks  1.Report decreased L knee pain  < / =  4/10  to increase tolerance for therapy exercises  2. Increase knee flexion ROM by 10-20 degrees passively in order to be able to perform ADLs without difficulty.  3. Increase strength by 1/3 MMT grade in LLE  to increase tolerance for ADL and work activities.  4. Pt to tolerate HEP to improve ROM and independence with ADL's  5. Increase knee extension ROM by 5-10 degrees passively to improve gait mechanics when returning to weightbearing     Long Term Goals: 8 weeks  1.Report decreased L knee pain < / = 2/10  to increase tolerance for ambulation  2.Patient goal: Pt to tolerate 10 minute increments of standing without increase in knee pain in order to return to cooking.   3.Increase strength to >/= 4+/5 in LLE  to increase tolerance for ADL and work activities.  4. Pt will report at CJ level (20-40% impaired) on FOTO knee to demonstrate increase in LE function with every day tasks.   5. Pt to demonstrate L knee AROM equal to R in order to improve gait mechanics for regular exercise          PLAN     Continue PT POC    Ricardo Carrillo, PTA

## 2022-06-16 ENCOUNTER — PATIENT MESSAGE (OUTPATIENT)
Dept: ORTHOPEDICS | Facility: CLINIC | Age: 67
End: 2022-06-16
Payer: MEDICARE

## 2022-06-16 RX ORDER — GABAPENTIN 100 MG/1
100 CAPSULE ORAL 3 TIMES DAILY
Qty: 90 CAPSULE | Refills: 11 | Status: SHIPPED | OUTPATIENT
Start: 2022-06-16 | End: 2023-06-16

## 2022-06-17 ENCOUNTER — CLINICAL SUPPORT (OUTPATIENT)
Dept: REHABILITATION | Facility: HOSPITAL | Age: 67
End: 2022-06-17
Payer: MEDICARE

## 2022-06-17 ENCOUNTER — PATIENT MESSAGE (OUTPATIENT)
Dept: ORTHOPEDICS | Facility: CLINIC | Age: 67
End: 2022-06-17
Payer: MEDICARE

## 2022-06-17 DIAGNOSIS — S82.192D OTHER CLOSED FRACTURE OF PROXIMAL END OF LEFT TIBIA WITH ROUTINE HEALING, SUBSEQUENT ENCOUNTER: Primary | ICD-10-CM

## 2022-06-17 DIAGNOSIS — S82.102D CLOSED FRACTURE OF PROXIMAL END OF LEFT TIBIA WITH ROUTINE HEALING, UNSPECIFIED FRACTURE MORPHOLOGY, SUBSEQUENT ENCOUNTER: Primary | ICD-10-CM

## 2022-06-17 DIAGNOSIS — M25.662 DECREASED RANGE OF MOTION (ROM) OF LEFT KNEE: ICD-10-CM

## 2022-06-17 DIAGNOSIS — M62.81 MUSCLE WEAKNESS OF LOWER EXTREMITY: ICD-10-CM

## 2022-06-17 PROCEDURE — 97140 MANUAL THERAPY 1/> REGIONS: CPT | Mod: PO,CQ

## 2022-06-17 PROCEDURE — 97110 THERAPEUTIC EXERCISES: CPT | Mod: PO,CQ

## 2022-06-17 NOTE — PROGRESS NOTES
[facility]   Physical Therapy Treatment Note     [id number]    Therapy Diagnosis:   Encounter Diagnoses   Name Primary?    Other closed fracture of proximal end of left tibia with routine healing, subsequent encounter Yes    Decreased range of motion (ROM) of left knee     Muscle weakness of lower extremity      Physician: Martin Cleary NP    Visit Date: 6/17/2022    Physician Orders: PT Eval and Treat   Medical Diagnosis from Referral: Closed fracture of proximal end of left tibia with routine healing, unspecified fracture morphology, subsequent encounter [S82.102D]  Evaluation Date: 5/18/2022  Authorization Period Expiration:12/31/22  Plan of Care Expiration: 8/18/22  Progress Note Due: 6/18/22  Visit # / Visits authorized: 5/40  FOTO: 1/3     PTA Visit #: 4/5     Precautions: Standard and toe touch weight bearing, DVT, osteoporosis  PROCEDURE:              Open reduction internal fixation left tibial plateau fracture  Foot flat touchdown weight-bearing left lower extremity x6 weeks (until 7/10/22)  Range of motion as tolerated left lower extremity  Will place her into a hinged knee brace. Lock in extension during transfers only.     Time In:745 AM  Time Out: 825 AM  Total Appointment Time (timed & untimed codes): 40 minutes (TE-3)       SUBJECTIVE     Pt reports:  She is feeling better and that her MD released her to begin weight bearing activities on her L LE with hinged knee brace on.   She was compliant with home exercise program.  Response to previous treatment: Felt fine   Functional change: none noted     Pain: not rated/10  Location: L leg     OBJECTIVE     MD note on 6/9/2022    - Continue therapy as ordered.  - Weight bearing, I will Barry her weight-bearing to weight bear as tolerated on hinged knee brace locked in extension for the 1st 2 weeks and then advance range of motion 0-30 degrees x2 weeks and increase by 30° every  "2 weeks thereafter until patient reaches 90° flexion and is able to bear weight.  Once she is at 90° she can discontinue the hinged knee brace.  - Range of motion as tolerated.      0 degrees of flexion locked in extension until 6/28  unlocked 30 degrees of flexion 6/28-7/12  Unlocked 60 degrees of flexion 7/12- 7/26  Unlocked 90 degress of flexion 7/26- 8/9     D/C hinged brace after 8/9     Treatment     Gina received the treatments listed below:         therapeutic exercises to develop strength, endurance, ROM and flexibility for 35 minutes including:    Nustep 6'  Seated calf stretch with strap 3 x 30"   Seated long sitting HSS with strap 5 x 30"   Supine heel prop 3 mins   Quad sets 2x10 with 3" L  SAQ 3x10 over full foam roll L 1#  LAQ's 3 x 10 L   Supine add sqz's 2 x 10   Supine clamshells 2 x 10 YTB   SLR 2x10   Heel slides 3 mins with 3" holds (supine AAROM)    Gait training 10' with RW on level surface to work on restoring gait mechanics as well as facilitating weight bearing through L LE since pt was cleared by MD to begin weight bearing.           Patient Education and Home Exercises     Home Exercises Provided and Patient Education Provided     Education provided:   - pt educated on all interventions performed at today's visit    Written Home Exercises Provided: Patient instructed to cont prior HEP. Exercises were reviewed and Gina was able to demonstrate them prior to the end of the session.  Gina demonstrated good  understanding of the education provided. See EMR under Patient Instructions for exercises provided during therapy sessions    ASSESSMENT     LAQ's were added to pt's therapeutic exercises regimen to continue to work on improve her L LE strength.  Pt with no pain post treatment session.  Will continue to monitor and progress pt within her tolerance.     Gina Is progressing well towards her goals.   Pt prognosis is Good.     Pt will continue to benefit from skilled outpatient physical " therapy to address the deficits listed in the problem list box on initial evaluation, provide pt/family education and to maximize pt's level of independence in the home and community environment.     Pt's spiritual, cultural and educational needs considered and pt agreeable to plan of care and goals.     Anticipated barriers to physical therapy: scheduling, NWB precautions, pain    Goals:     Short Term Goals:  4 weeks  1.Report decreased L knee pain  < / =  4/10  to increase tolerance for therapy exercises  2. Increase knee flexion ROM by 10-20 degrees passively in order to be able to perform ADLs without difficulty.  3. Increase strength by 1/3 MMT grade in LLE  to increase tolerance for ADL and work activities.  4. Pt to tolerate HEP to improve ROM and independence with ADL's  5. Increase knee extension ROM by 5-10 degrees passively to improve gait mechanics when returning to weightbearing     Long Term Goals: 8 weeks  1.Report decreased L knee pain < / = 2/10  to increase tolerance for ambulation  2.Patient goal: Pt to tolerate 10 minute increments of standing without increase in knee pain in order to return to cooking.   3.Increase strength to >/= 4+/5 in LLE  to increase tolerance for ADL and work activities.  4. Pt will report at CJ level (20-40% impaired) on FOTO knee to demonstrate increase in LE function with every day tasks.   5. Pt to demonstrate L knee AROM equal to R in order to improve gait mechanics for regular exercise          PLAN     Continue PT POC    Ricardo Carrillo, PTA

## 2022-06-22 ENCOUNTER — CLINICAL SUPPORT (OUTPATIENT)
Dept: REHABILITATION | Facility: HOSPITAL | Age: 67
End: 2022-06-22
Payer: MEDICARE

## 2022-06-22 DIAGNOSIS — M62.81 MUSCLE WEAKNESS OF LOWER EXTREMITY: ICD-10-CM

## 2022-06-22 DIAGNOSIS — M25.662 DECREASED RANGE OF MOTION (ROM) OF LEFT KNEE: Primary | ICD-10-CM

## 2022-06-22 PROCEDURE — 97110 THERAPEUTIC EXERCISES: CPT | Mod: PO

## 2022-06-22 NOTE — PROGRESS NOTES
[facility]   Physical Therapy Treatment Note     [id number]    Therapy Diagnosis:   Encounter Diagnoses   Name Primary?    Decreased range of motion (ROM) of left knee Yes    Muscle weakness of lower extremity      Physician: Martin Cleary NP    Visit Date: 6/22/2022    Physician Orders: PT Eval and Treat   Medical Diagnosis from Referral: Closed fracture of proximal end of left tibia with routine healing, unspecified fracture morphology, subsequent encounter [S82.102D]  Evaluation Date: 5/18/2022  Authorization Period Expiration:12/31/22  Plan of Care Expiration: 8/18/22  Progress Note Due: 7/22/22  Visit # / Visits authorized: 6/40  FOTO: 1/3     PTA Visit #: 0/5     Precautions: Standard and toe touch weight bearing, DVT, osteoporosis  PROCEDURE:Open reduction internal fixation left tibial plateau fracture       Time In: 9:45 AM  Time Out: 10:30 AM  Total Appointment Time (timed & untimed codes): 45 minutes (TE-3)       SUBJECTIVE     Pt reports:  Still having tingling in the whole foot since nerve block wore off after surgery    She was compliant with home exercise program.  Response to previous treatment: Felt fine   Functional change: none noted     Pain: 6/10 in akle, 5/10 in the knee  Location: L leg     OBJECTIVE     MD note on 6/9/2022  - Continue therapy as ordered.  - Weight bearing, I will Barry her weight-bearing to weight bear as tolerated on hinged knee brace locked in extension for the 1st 2 weeks (6/23) and then advance range of motion 0-30 degrees x2 weeks (7/7) and increase by 30° every 2 weeks (7/21) thereafter until patient reaches 90° flexion and is able to bear weight.  Once she is at 90° she can discontinue the hinged knee brace.  - Range of motion as tolerated.      0 degrees of flexion locked in extension until 6/28  unlocked 30 degrees of flexion 6/28-7/12  Unlocked 60 degrees of flexion 7/12- 7/26  Unlocked 90  "degress of flexion 7/26- 8/9     D/C hinged brace after 8/9     Range of Motion (Passive):   Knee Right  Left    Flexion    Extension NT Lacking 9      Range of Motion (Active):   Knee Right  Left    Flexion 133 94   Extension 0 Lacking 11         Lower Extremity Strength  Right LE   Left LE     Quadriceps: 5/5 Quadriceps: 2-/5   Hamstrings: 4+/5 Hamstrings: 2-/5   Hip flexion (seated): 4+/5 Hip flexion (seated): NT   Hip extension:  NT Hip extension: NT   Hip Abduction: 4-/5 Hip Abduction:  3+/5          Treatment     Gina received the treatments listed below:         therapeutic exercises to develop strength, endurance, ROM and flexibility for 38 minutes including:    Assessment as above  Nustep 6'  Seated calf stretch with strap 3 x 30"   Seated long sitting HSS with strap 5 x 30"   Supine heel prop 3 mins   Quad sets 2x10 with 3" L  SAQ 3x10 over full foam roll L 1#  LAQ's 3 x 10 L 1#  Supine add sqz's 2 x 10   Supine clamshells 2 x 10 YTB   SLR 2x10 (NO BRACE)  Heel slides 3 mins with 3" holds (supine AAROM)  Prone knee hang x2 mins    Gait training 7' with RW on level surface to work on restoring gait mechanics as well as facilitating weight bearing through L LE since pt was cleared by MD to begin weight bearing.           Patient Education and Home Exercises     Home Exercises Provided and Patient Education Provided     Education provided:   - pt educated on all interventions performed at today's visit    Written Home Exercises Provided: Patient instructed to cont prior HEP. Exercises were reviewed and Gina was able to demonstrate them prior to the end of the session.  Gina demonstrated good  understanding of the education provided. See EMR under Patient Instructions for exercises provided during therapy sessions    ASSESSMENT     Gina is doing well today with improvement in knee flexion and extension ROM since initial eval. Strength remains the same at this time. Brace was unlocked to 30 degrees " today per physician orders and gait training was performed with brace on in new settings. Patient was instructed to slide the red locks down to lock brace in place if at any point she feels that she is not stable enough when unlocked to 30 degrees. She was also instructed that she is allowed to perform full ROM as tolerated at the knee and that the brace must be donned at all times when weight bearing. She tolerated exercises well today. Progress as tolerated.     Gina Is progressing well towards her goals.   Pt prognosis is Good.     Pt will continue to benefit from skilled outpatient physical therapy to address the deficits listed in the problem list box on initial evaluation, provide pt/family education and to maximize pt's level of independence in the home and community environment.     Pt's spiritual, cultural and educational needs considered and pt agreeable to plan of care and goals.     Anticipated barriers to physical therapy: scheduling, NWB precautions, pain    Goals:     Short Term Goals:  4 weeks  (not met, progressing)  1.Report decreased L knee pain  < / =  4/10  to increase tolerance for therapy exercises  2. Increase knee flexion ROM by 10-20 degrees passively in order to be able to perform ADLs without difficulty. MET  3. Increase strength by 1/3 MMT grade in LLE  to increase tolerance for ADL and work activities.  4. Pt to tolerate HEP to improve ROM and independence with ADL's MET  5. Increase knee extension ROM by 5-10 degrees passively to improve gait mechanics when returning to weightbearing MET     Long Term Goals: 8 weeks (not met, progressing)  1.Report decreased L knee pain < / = 2/10  to increase tolerance for ambulation  2.Patient goal: Pt to tolerate 10 minute increments of standing without increase in knee pain in order to return to cooking.   3.Increase strength to >/= 4+/5 in LLE  to increase tolerance for ADL and work activities.  4. Pt will report at CJ level (20-40% impaired) on  FOTO knee to demonstrate increase in LE function with every day tasks.   5. Pt to demonstrate L knee AROM equal to R in order to improve gait mechanics for regular exercise          PLAN     Continue PT POC    Wandy Lopez, PT

## 2022-06-24 ENCOUNTER — CLINICAL SUPPORT (OUTPATIENT)
Dept: REHABILITATION | Facility: HOSPITAL | Age: 67
End: 2022-06-24
Payer: MEDICARE

## 2022-06-24 DIAGNOSIS — M25.662 DECREASED RANGE OF MOTION (ROM) OF LEFT KNEE: Primary | ICD-10-CM

## 2022-06-24 DIAGNOSIS — M62.81 MUSCLE WEAKNESS OF LOWER EXTREMITY: ICD-10-CM

## 2022-06-24 PROCEDURE — 97110 THERAPEUTIC EXERCISES: CPT | Mod: PO

## 2022-06-24 NOTE — PROGRESS NOTES
[facility]   Physical Therapy Treatment Note     [facility] [id number]    Therapy Diagnosis:   Encounter Diagnoses   Name Primary?    Decreased range of motion (ROM) of left knee Yes    Muscle weakness of lower extremity      Physician: Martin Cleary NP    Visit Date: 6/24/2022    Physician Orders: PT Eval and Treat   Medical Diagnosis from Referral: Closed fracture of proximal end of left tibia with routine healing, unspecified fracture morphology, subsequent encounter [S82.102D]  Evaluation Date: 5/18/2022  Authorization Period Expiration:12/31/22  Plan of Care Expiration: 8/18/22  Progress Note Due: 7/22/22  Visit # / Visits authorized: 7/40  FOTO: 2/3       PTA Visit #: 0/5     Precautions: Standard and toe touch weight bearing, DVT, osteoporosis  PROCEDURE:Open reduction internal fixation left tibial plateau fracture       Time In: 8:15 AM  Time Out: 8:57 AM  Total Appointment Time (timed & untimed codes): 42 minutes (TE-3)       SUBJECTIVE     Pt reports:  Having soreness in the knee and ankle since walking with walker and brace unlocked. She takes acetaminophen arthritis (2 in PM, 1 in AM) which seems to help.    She was compliant with home exercise program.  Response to previous treatment: Felt fine   Functional change: none noted     Pain: 6/10 in akle, 6/10 in the knee  Location: L leg     OBJECTIVE     MD note on 6/9/2022  - Continue therapy as ordered.  - Weight bearing, I will Barry her weight-bearing to weight bear as tolerated on hinged knee brace locked in extension for the 1st 2 weeks (6/23) and then advance range of motion 0-30 degrees x2 weeks (7/7) and increase by 30° every 2 weeks (7/21) thereafter until patient reaches 90° flexion and is able to bear weight.  Once she is at 90° she can discontinue the hinged knee brace.  - Range of motion as tolerated.      Treatment     Gina received the treatments listed below:   "       therapeutic exercises to develop strength, endurance, ROM and flexibility for 38 minutes including:    Nustep 6'  Quad sets 2x10 with 3" L  SAQ 3x10 over full foam roll L 1#  SLR 1# 2x10 L  Heel slides 3 mins with 3" holds (supine AAROM)  Supine knee PROM with heel prop with 1# above knee x2 min  +bridges 2x10  +sidelying clamshells 2x10 B  LAQ's 3 x 10 L 1#      Stair training for independent stair use in home. Instructed patient to ascend leading with R leg and descend leading with left leg for 4 minutes     Gait training 7' with RW on level surface to work on restoring gait mechanics as well as facilitating weight bearing through L LE since pt was cleared by MD to begin weight bearing.           Patient Education and Home Exercises     Home Exercises Provided and Patient Education Provided     Education provided:   - pt educated on all interventions performed at today's visit    Written Home Exercises Provided: Patient instructed to cont prior HEP. Exercises were reviewed and Gina was able to demonstrate them prior to the end of the session.  Gina demonstrated good  understanding of the education provided. See EMR under Patient Instructions for exercises provided during therapy sessions    ASSESSMENT       Gina arrives today with reports of soreness in the knee in ankle as expected due to transition from wheelchair to walk full time with hinged knee brace unlocked to 30 degrees. She continues to have difficulty with quad activation and may benefit from e-stim to assist with neuromuscular re-education. Initiated stair training today leading with right (uninvolved) leg for ascent and left (involved) leg for descent with good response. Consider progression of quad, glute and hamstring strength and weightbearing strengthening activity    Gina Is progressing well towards her goals.   Pt prognosis is Good.     Pt will continue to benefit from skilled outpatient physical therapy to address the deficits listed " in the problem list box on initial evaluation, provide pt/family education and to maximize pt's level of independence in the home and community environment.     Pt's spiritual, cultural and educational needs considered and pt agreeable to plan of care and goals.     Anticipated barriers to physical therapy: scheduling, NWB precautions, pain    Goals:     Short Term Goals:  4 weeks  (not met, progressing)  1.Report decreased L knee pain  < / =  4/10  to increase tolerance for therapy exercises  2. Increase knee flexion ROM by 10-20 degrees passively in order to be able to perform ADLs without difficulty. MET  3. Increase strength by 1/3 MMT grade in LLE  to increase tolerance for ADL and work activities.  4. Pt to tolerate HEP to improve ROM and independence with ADL's MET  5. Increase knee extension ROM by 5-10 degrees passively to improve gait mechanics when returning to weightbearing MET     Long Term Goals: 8 weeks (not met, progressing)  1.Report decreased L knee pain < / = 2/10  to increase tolerance for ambulation  2.Patient goal: Pt to tolerate 10 minute increments of standing without increase in knee pain in order to return to cooking.   3.Increase strength to >/= 4+/5 in LLE  to increase tolerance for ADL and work activities.  4. Pt will report at CJ level (20-40% impaired) on FOTO knee to demonstrate increase in LE function with every day tasks.   5. Pt to demonstrate L knee AROM equal to R in order to improve gait mechanics for regular exercise          PLAN     Continue PT POC    Wandy Lopez, PT

## 2022-06-28 ENCOUNTER — CLINICAL SUPPORT (OUTPATIENT)
Dept: REHABILITATION | Facility: HOSPITAL | Age: 67
End: 2022-06-28
Payer: MEDICARE

## 2022-06-28 DIAGNOSIS — M62.81 MUSCLE WEAKNESS OF LOWER EXTREMITY: ICD-10-CM

## 2022-06-28 DIAGNOSIS — S82.102D CLOSED FRACTURE OF PROXIMAL END OF LEFT TIBIA WITH ROUTINE HEALING, UNSPECIFIED FRACTURE MORPHOLOGY, SUBSEQUENT ENCOUNTER: Primary | ICD-10-CM

## 2022-06-28 DIAGNOSIS — M25.662 DECREASED RANGE OF MOTION (ROM) OF LEFT KNEE: ICD-10-CM

## 2022-06-28 PROCEDURE — 97110 THERAPEUTIC EXERCISES: CPT | Mod: PO,CQ

## 2022-06-28 NOTE — PROGRESS NOTES
[facility]   Physical Therapy Treatment Note     [id number]    Therapy Diagnosis:   Encounter Diagnoses   Name Primary?    Closed fracture of proximal end of left tibia with routine healing, unspecified fracture morphology, subsequent encounter Yes    Decreased range of motion (ROM) of left knee     Muscle weakness of lower extremity      Physician: Martin Cleary NP    Visit Date: 6/28/2022    Physician Orders: PT Eval and Treat   Medical Diagnosis from Referral: Closed fracture of proximal end of left tibia with routine healing, unspecified fracture morphology, subsequent encounter [S82.102D]  Evaluation Date: 5/18/2022  Authorization Period Expiration:12/31/22  Plan of Care Expiration: 8/18/22  Progress Note Due: 7/22/22  Visit # / Visits authorized: 7/40  FOTO: 2/3       PTA Visit #: 0/5     Precautions: Standard and toe touch weight bearing, DVT, osteoporosis  PROCEDURE:Open reduction internal fixation left tibial plateau fracture       Time In: 8:15 AM  Time Out: 8:57 AM  Total Appointment Time (timed & untimed codes): 42 minutes (TE-3)       SUBJECTIVE     Pt reports: her leg is feeling better and doesn't have much pain.   She was compliant with home exercise program.  Response to previous treatment: Felt fine   Functional change: none noted     Pain: not rated/10 in akle, not rated/10 in the knee  Location: L leg     OBJECTIVE     MD note on 6/9/2022  - Continue therapy as ordered.  - Weight bearing, I will Barry her weight-bearing to weight bear as tolerated on hinged knee brace locked in extension for the 1st 2 weeks (6/23) and then advance range of motion 0-30 degrees x2 weeks (7/7) and increase by 30° every 2 weeks (7/21) thereafter until patient reaches 90° flexion and is able to bear weight.  Once she is at 90° she can discontinue the hinged knee brace.  - Range of motion as tolerated.      Treatment     Gina received the  "treatments listed below:         therapeutic exercises to develop strength, endurance, ROM and flexibility for 45 minutes including:    Nustep 6'  Quad sets 2x10 with 3" L  SAQ 3x10 over full foam roll L 1#  SLR 1# 2x10 L  Heel slides 3 mins with 3" holds (supine AAROM)  Supine knee PROM with heel prop with 1# above knee x2 min  bridges 2x10  sidelying clamshells 2x10 B  LAQ's 3 x 10 L 1#      Stair training for independent stair use in home. Instructed patient to ascend leading with R leg and descend leading with left leg for 0 minutes     Gait training 0' with RW on level surface to work on restoring gait mechanics as well as facilitating weight bearing through L LE since pt was cleared by MD to begin weight bearing.           Patient Education and Home Exercises     Home Exercises Provided and Patient Education Provided     Education provided:   - pt educated on all interventions performed at today's visit    Written Home Exercises Provided: Patient instructed to cont prior HEP. Exercises were reviewed and Gina was able to demonstrate them prior to the end of the session.  Gina demonstrated good  understanding of the education provided. See EMR under Patient Instructions for exercises provided during therapy sessions    ASSESSMENT     Pt with no pain post treatment session.  YTB was added for side lying clamshells to work on improving her lateral hip strength.  Will continue to monitor and progress pt within her tolerance.     Gina Is progressing well towards her goals.   Pt prognosis is Good.     Pt will continue to benefit from skilled outpatient physical therapy to address the deficits listed in the problem list box on initial evaluation, provide pt/family education and to maximize pt's level of independence in the home and community environment.     Pt's spiritual, cultural and educational needs considered and pt agreeable to plan of care and goals.     Anticipated barriers to physical therapy: scheduling, " NWB precautions, pain    Goals:     Short Term Goals:  4 weeks  (not met, progressing)  1.Report decreased L knee pain  < / =  4/10  to increase tolerance for therapy exercises  2. Increase knee flexion ROM by 10-20 degrees passively in order to be able to perform ADLs without difficulty. MET  3. Increase strength by 1/3 MMT grade in LLE  to increase tolerance for ADL and work activities.  4. Pt to tolerate HEP to improve ROM and independence with ADL's MET  5. Increase knee extension ROM by 5-10 degrees passively to improve gait mechanics when returning to weightbearing MET     Long Term Goals: 8 weeks (not met, progressing)  1.Report decreased L knee pain < / = 2/10  to increase tolerance for ambulation  2.Patient goal: Pt to tolerate 10 minute increments of standing without increase in knee pain in order to return to cooking.   3.Increase strength to >/= 4+/5 in LLE  to increase tolerance for ADL and work activities.  4. Pt will report at CJ level (20-40% impaired) on FOTO knee to demonstrate increase in LE function with every day tasks.   5. Pt to demonstrate L knee AROM equal to R in order to improve gait mechanics for regular exercise          PLAN     Continue PT POC    Ricardo Carrillo, PTA

## 2022-07-01 ENCOUNTER — CLINICAL SUPPORT (OUTPATIENT)
Dept: REHABILITATION | Facility: HOSPITAL | Age: 67
End: 2022-07-01
Payer: MEDICARE

## 2022-07-01 DIAGNOSIS — M25.662 DECREASED RANGE OF MOTION (ROM) OF LEFT KNEE: Primary | ICD-10-CM

## 2022-07-01 DIAGNOSIS — M62.81 MUSCLE WEAKNESS OF LOWER EXTREMITY: ICD-10-CM

## 2022-07-01 PROCEDURE — 97110 THERAPEUTIC EXERCISES: CPT | Mod: PO

## 2022-07-01 PROCEDURE — 97140 MANUAL THERAPY 1/> REGIONS: CPT | Mod: PO

## 2022-07-01 NOTE — PROGRESS NOTES
[facility]   Physical Therapy Treatment Note     [id number]    Therapy Diagnosis:   Encounter Diagnoses   Name Primary?    Decreased range of motion (ROM) of left knee Yes    Muscle weakness of lower extremity      Physician: Martin Cleary NP    Visit Date: 7/1/2022    Physician Orders: PT Eval and Treat   Medical Diagnosis from Referral: Closed fracture of proximal end of left tibia with routine healing, unspecified fracture morphology, subsequent encounter [S82.102D]  Evaluation Date: 5/18/2022  Authorization Period Expiration:12/31/22  Plan of Care Expiration: 8/18/22  Progress Note Due: 7/22/22  Visit # / Visits authorized: 8/40  FOTO: 2/3       PTA Visit #: 0/5     Precautions: Standard and toe touch weight bearing, DVT, osteoporosis  PROCEDURE:Open reduction internal fixation left tibial plateau fracture       Time In: 8:15 AM  Time Out: 8:57 AM  Total Appointment Time (timed & untimed codes): 42 minutes       SUBJECTIVE     Pt reports: continuing to improve. Still having tingling in the foot. Can go up 5 stairs at a time right now.   She was compliant with home exercise program.  Response to previous treatment: Felt fine   Functional change: none noted     Pain: not rated/10 in akle, not rated/10 in the knee  Location: L leg     OBJECTIVE     MD note on 6/9/2022  - Continue therapy as ordered.  - Weight bearing, I will Barry her weight-bearing to weight bear as tolerated on hinged knee brace locked in extension for the 1st 2 weeks (6/23) and then advance range of motion 0-30 degrees x2 weeks (7/7) and increase by 30° every 2 weeks (7/21) thereafter until patient reaches 90° flexion and is able to bear weight.  Once she is at 90° she can discontinue the hinged knee brace.  - Range of motion as tolerated.        99 degrees knee flexion AROM on 7/1/22    Treatment     Gina received the treatments listed below:         therapeutic  "exercises to develop strength, endurance, ROM and flexibility for 34 minutes including:    Nustep 5'  Quad sets 2x10 with 3" L  SAQ 3x10 over full foam roll L 1#  SLR 1# 2x10 L  Heel slides 3 mins with 3" holds (supine AAROM)  Supine knee PROM with heel prop with 1# above knee x2 min  bridges 2x10  sidelying clamshells 2x10 B  LAQ's 3 x 10 L 1#    With brace ON:   +heel raises x30  +weight shifting M/L x15  +mini squats x20       Stair training for independent stair use in home. Instructed patient to ascend leading with R leg and descend leading with left leg for 0 minutes     Gait training 0' with RW on level surface to work on restoring gait mechanics as well as facilitating weight bearing through L LE since pt was cleared by MD to begin weight bearing.    Manual for 8 minutes:  Patellar mobilizations in all directions with patient education to perform at home  PROM into knee extension with tibial ER.            Patient Education and Home Exercises     Home Exercises Provided and Patient Education Provided     Education provided:   - pt educated on all interventions performed at today's visit    Written Home Exercises Provided: Patient instructed to cont prior HEP. Exercises were reviewed and Gina was able to demonstrate them prior to the end of the session.  Gina demonstrated good  understanding of the education provided. See EMR under Patient Instructions for exercises provided during therapy sessions    ASSESSMENT     Gina is doing well today with reports of ability to navigate 5 stairs at a time in the home. She was educated to attempt the remaining stairs and that if she feels like she can't make it to have a seat and push herself up the stairs going backwards or down the stairs going forwards while sitting. She has some pain with plan PROM into knee extension, but pain is reduced when tibial ER is provided. Demonstrated side to side difference in patella mobility to patient today and instructed her to " perform this mobilization on herself to improve quad activation and knee flexion and extension ROM. Brace should remain locked at 30 degrees until 7/7/22 at which point it may be advanced to 60 degrees. She did well with weightbearing activity today. Progress weightbearing activity as tolerated by patient.     Gina Is progressing well towards her goals.   Pt prognosis is Good.     Pt will continue to benefit from skilled outpatient physical therapy to address the deficits listed in the problem list box on initial evaluation, provide pt/family education and to maximize pt's level of independence in the home and community environment.     Pt's spiritual, cultural and educational needs considered and pt agreeable to plan of care and goals.     Anticipated barriers to physical therapy: scheduling, NWB precautions, pain    Goals:     Short Term Goals:  4 weeks  (not met, progressing)  1.Report decreased L knee pain  < / =  4/10  to increase tolerance for therapy exercises  2. Increase knee flexion ROM by 10-20 degrees passively in order to be able to perform ADLs without difficulty. MET  3. Increase strength by 1/3 MMT grade in LLE  to increase tolerance for ADL and work activities.  4. Pt to tolerate HEP to improve ROM and independence with ADL's MET  5. Increase knee extension ROM by 5-10 degrees passively to improve gait mechanics when returning to weightbearing MET     Long Term Goals: 8 weeks (not met, progressing)  1.Report decreased L knee pain < / = 2/10  to increase tolerance for ambulation  2.Patient goal: Pt to tolerate 10 minute increments of standing without increase in knee pain in order to return to cooking.   3.Increase strength to >/= 4+/5 in LLE  to increase tolerance for ADL and work activities.  4. Pt will report at CJ level (20-40% impaired) on FOTO knee to demonstrate increase in LE function with every day tasks.   5. Pt to demonstrate L knee AROM equal to R in order to improve gait mechanics for  regular exercise          PLAN     Continue PT POC    Wandy Lopez, PT

## 2022-07-05 ENCOUNTER — CLINICAL SUPPORT (OUTPATIENT)
Dept: REHABILITATION | Facility: HOSPITAL | Age: 67
End: 2022-07-05
Payer: MEDICARE

## 2022-07-05 DIAGNOSIS — M25.662 DECREASED RANGE OF MOTION (ROM) OF LEFT KNEE: ICD-10-CM

## 2022-07-05 DIAGNOSIS — S82.102D CLOSED FRACTURE OF PROXIMAL END OF LEFT TIBIA WITH ROUTINE HEALING, UNSPECIFIED FRACTURE MORPHOLOGY, SUBSEQUENT ENCOUNTER: Primary | ICD-10-CM

## 2022-07-05 DIAGNOSIS — M62.81 MUSCLE WEAKNESS OF LOWER EXTREMITY: ICD-10-CM

## 2022-07-05 PROCEDURE — 97140 MANUAL THERAPY 1/> REGIONS: CPT | Mod: PO,CQ

## 2022-07-05 PROCEDURE — 97110 THERAPEUTIC EXERCISES: CPT | Mod: PO,CQ

## 2022-07-05 NOTE — PROGRESS NOTES
[facility]   Physical Therapy Treatment Note     [id number]    Therapy Diagnosis:   Encounter Diagnoses   Name Primary?    Closed fracture of proximal end of left tibia with routine healing, unspecified fracture morphology, subsequent encounter Yes    Decreased range of motion (ROM) of left knee     Muscle weakness of lower extremity      Physician: Martin Cleary NP    Visit Date: 7/5/2022    Physician Orders: PT Eval and Treat   Medical Diagnosis from Referral: Closed fracture of proximal end of left tibia with routine healing, unspecified fracture morphology, subsequent encounter [S82.102D]  Evaluation Date: 5/18/2022  Authorization Period Expiration:12/31/22  Plan of Care Expiration: 8/18/22  Progress Note Due: 7/22/22  Visit # / Visits authorized: 11/40  FOTO: 2/3       PTA Visit #: 1/5     Precautions: Standard and toe touch weight bearing, DVT, osteoporosis  PROCEDURE:Open reduction internal fixation left tibial plateau fracture       Time In: 7:45 AM  Time Out: 8: AM  Total Appointment Time (timed & untimed codes):  minutes       SUBJECTIVE     Pt reports:  She overall is doing well but still has some slight tingling in her L LE   She was compliant with home exercise program.  Response to previous treatment: Felt fine   Functional change: none noted     Pain: not rated/10 in akle, not rated/10 in the knee  Location: L leg     OBJECTIVE     MD note on 6/9/2022  - Continue therapy as ordered.  - Weight bearing, I will Barry her weight-bearing to weight bear as tolerated on hinged knee brace locked in extension for the 1st 2 weeks (6/23) and then advance range of motion 0-30 degrees x2 weeks (7/7) and increase by 30° every 2 weeks (7/21) thereafter until patient reaches 90° flexion and is able to bear weight.  Once she is at 90° she can discontinue the hinged knee brace.  - Range of motion as tolerated.        99 degrees knee flexion  "AROM on 7/1/22    Treatment     Gina received the treatments listed below:         therapeutic exercises to develop strength, endurance, ROM and flexibility for 32 minutes including:    Nustep 6'  Quad sets 3x10 with 3" L  SAQ 3x10 over full foam roll L 1#  SLR 1# 3x10 L  Heel slides 3 mins with 3" holds (supine AAROM)  Supine knee PROM with heel prop with 1# above knee x2 min  bridges 2x10  sidelying clamshells 2x10 B  LAQ's 3 x 10 L 1#    With brace ON:   heel raises x30  weight shifting M/L 2'  mini squats x20       Stair training for independent stair use in home. Instructed patient to ascend leading with R leg and descend leading with left leg for 0 minutes     Gait training 0' with RW on level surface to work on restoring gait mechanics as well as facilitating weight bearing through L LE since pt was cleared by MD to begin weight bearing.    Manual for 8 minutes:    Patellar mobilizations in all directions with patient education to perform at home  PROM into knee extension with tibial ER.            Patient Education and Home Exercises     Home Exercises Provided and Patient Education Provided     Education provided:   - pt educated on all interventions performed at today's visit    Written Home Exercises Provided: Patient instructed to cont prior HEP. Exercises were reviewed and Gina was able to demonstrate them prior to the end of the session.  Gina demonstrated good  understanding of the education provided. See EMR under Patient Instructions for exercises provided during therapy sessions    ASSESSMENT     Pt performed increased repetitions on all of her mat based exercises this morning without reports of pain.  Pt's knee brace will be unlocked to 60 degrees of knee flexion later this week per MD protocol.  Will continue to monitor and progress pt within her tolerance.     Gina Is progressing well towards her goals.   Pt prognosis is Good.     Pt will continue to benefit from skilled outpatient physical " therapy to address the deficits listed in the problem list box on initial evaluation, provide pt/family education and to maximize pt's level of independence in the home and community environment.     Pt's spiritual, cultural and educational needs considered and pt agreeable to plan of care and goals.     Anticipated barriers to physical therapy: scheduling, NWB precautions, pain    Goals:     Short Term Goals:  4 weeks  (not met, progressing)  1.Report decreased L knee pain  < / =  4/10  to increase tolerance for therapy exercises  2. Increase knee flexion ROM by 10-20 degrees passively in order to be able to perform ADLs without difficulty. MET  3. Increase strength by 1/3 MMT grade in LLE  to increase tolerance for ADL and work activities.  4. Pt to tolerate HEP to improve ROM and independence with ADL's MET  5. Increase knee extension ROM by 5-10 degrees passively to improve gait mechanics when returning to weightbearing MET     Long Term Goals: 8 weeks (not met, progressing)  1.Report decreased L knee pain < / = 2/10  to increase tolerance for ambulation  2.Patient goal: Pt to tolerate 10 minute increments of standing without increase in knee pain in order to return to cooking.   3.Increase strength to >/= 4+/5 in LLE  to increase tolerance for ADL and work activities.  4. Pt will report at CJ level (20-40% impaired) on FOTO knee to demonstrate increase in LE function with every day tasks.   5. Pt to demonstrate L knee AROM equal to R in order to improve gait mechanics for regular exercise          PLAN     Continue PT POC    Ricardo Carrillo, PTA

## 2022-07-08 ENCOUNTER — CLINICAL SUPPORT (OUTPATIENT)
Dept: REHABILITATION | Facility: HOSPITAL | Age: 67
End: 2022-07-08
Payer: MEDICARE

## 2022-07-08 DIAGNOSIS — M25.662 DECREASED RANGE OF MOTION (ROM) OF LEFT KNEE: Primary | ICD-10-CM

## 2022-07-08 DIAGNOSIS — M62.81 MUSCLE WEAKNESS OF LOWER EXTREMITY: ICD-10-CM

## 2022-07-08 PROCEDURE — 97110 THERAPEUTIC EXERCISES: CPT | Mod: PO

## 2022-07-08 NOTE — PROGRESS NOTES
[facility]   Physical Therapy Treatment Note     [id number]    Therapy Diagnosis:   Encounter Diagnoses   Name Primary?    Decreased range of motion (ROM) of left knee Yes    Muscle weakness of lower extremity      Physician: Martin Cleary NP    Visit Date: 7/8/2022    Physician Orders: PT Eval and Treat   Medical Diagnosis from Referral: Closed fracture of proximal end of left tibia with routine healing, unspecified fracture morphology, subsequent encounter [S82.102D]  Evaluation Date: 5/18/2022  Authorization Period Expiration:12/31/22  Plan of Care Expiration: 8/18/22  Progress Note Due: 7/22/22  Visit # / Visits authorized: 12/40  FOTO: 2/3       PTA Visit #: 0/5     Precautions: Standard and toe touch weight bearing, DVT, osteoporosis  PROCEDURE:Open reduction internal fixation left tibial plateau fracture       Time In: 8:15 AM  Time Out: 9:00 AM  Total Appointment Time (timed & untimed codes): 45 minutes       SUBJECTIVE     Pt reports:  Knee and ankle were a little sore yesterday but feels fine today. Has been walking around the home wihtout walker and was able to navigate all 15 stairs without issue.   She was compliant with home exercise program.  Response to previous treatment: Felt fine   Functional change: none noted     Pain: not rated/10 in akle, not rated/10 in the knee  Location: L leg     OBJECTIVE     MD note on 6/9/2022  - Continue therapy as ordered.  - Weight bearing, I will Barry her weight-bearing to weight bear as tolerated on hinged knee brace locked in extension for the 1st 2 weeks (6/23) and then advance range of motion 0-30 degrees x2 weeks (7/7) and increase by 30° every 2 weeks (7/21) thereafter until patient reaches 90° flexion and is able to bear weight.  Once she is at 90° she can discontinue the hinged knee brace.  - Range of motion as tolerated.        99 degrees knee flexion AROM on 7/1/22    Treatment  "    Gina received the treatments listed below:         therapeutic exercises to develop strength, endurance, ROM and flexibility for 45 minutes including:    Nustep 6' (discharge)  Quad sets 3x10 with 3" L (discharge to HEP today)  SAQ 3x10 over full foam roll L 1#  SLR 1# 3x10 L  Heel slides 3 mins with 3" holds (supine AAROM) (discharge to HEP today)  Supine knee PROM with heel prop with 1# above knee x2 min  bridges 2x10  sidelying clamshells 2x10 B  LAQ's 3 x 10 L 1#    With brace ON:   heel raises x30  weight shifting M/L 2'  mini squats x20   Step ups at stairs leading with L x20      Stair training for independent stair use in home. Instructed patient to ascend leading with R leg and descend leading with left leg for 0 minutes     Gait training 0' with RW on level surface to work on restoring gait mechanics as well as facilitating weight bearing through L LE since pt was cleared by MD to begin weight bearing.      Manual for 0 minutes:  Patellar mobilizations in all directions with patient education to perform at home  PROM into knee extension with tibial ER.            Patient Education and Home Exercises     Home Exercises Provided and Patient Education Provided     Education provided:   - pt educated on all interventions performed at today's visit    Written Home Exercises Provided: Patient instructed to cont prior HEP. Exercises were reviewed and Gina was able to demonstrate them prior to the end of the session.  Gina demonstrated good  understanding of the education provided. See EMR under Patient Instructions for exercises provided during therapy sessions    ASSESSMENT     Gina is doing well today and notes improved function at home including ambulation without assistive device and ability to navigate full flight of stairs. She does not demonstrate quad lag today with SLR. Brace unlocked to 60 degrees today per surgeon guidelines and ambulation supervised prior to leaving clinic. Pt states no pain " or difficulty with gait when brace is unlocked. She is able to perform step ups leading with L leg for quad strengthening today. Heel slides and quad sets are discharged to home program as of today. Progress as tolerated with weightbearing activity.     Gina Is progressing well towards her goals.   Pt prognosis is Good.     Pt will continue to benefit from skilled outpatient physical therapy to address the deficits listed in the problem list box on initial evaluation, provide pt/family education and to maximize pt's level of independence in the home and community environment.     Pt's spiritual, cultural and educational needs considered and pt agreeable to plan of care and goals.     Anticipated barriers to physical therapy: scheduling, NWB precautions, pain    Goals:     Short Term Goals:  4 weeks  (not met, progressing)  1.Report decreased L knee pain  < / =  4/10  to increase tolerance for therapy exercises  2. Increase knee flexion ROM by 10-20 degrees passively in order to be able to perform ADLs without difficulty. MET  3. Increase strength by 1/3 MMT grade in LLE  to increase tolerance for ADL and work activities.  4. Pt to tolerate HEP to improve ROM and independence with ADL's MET  5. Increase knee extension ROM by 5-10 degrees passively to improve gait mechanics when returning to weightbearing MET     Long Term Goals: 8 weeks (not met, progressing)  1.Report decreased L knee pain < / = 2/10  to increase tolerance for ambulation  2.Patient goal: Pt to tolerate 10 minute increments of standing without increase in knee pain in order to return to cooking.   3.Increase strength to >/= 4+/5 in LLE  to increase tolerance for ADL and work activities.  4. Pt will report at CJ level (20-40% impaired) on FOTO knee to demonstrate increase in LE function with every day tasks.   5. Pt to demonstrate L knee AROM equal to R in order to improve gait mechanics for regular exercise          PLAN     Continue PT  POC    Wandy Lopez, PT

## 2022-07-12 ENCOUNTER — CLINICAL SUPPORT (OUTPATIENT)
Dept: REHABILITATION | Facility: HOSPITAL | Age: 67
End: 2022-07-12
Payer: MEDICARE

## 2022-07-12 DIAGNOSIS — M62.81 MUSCLE WEAKNESS OF LOWER EXTREMITY: ICD-10-CM

## 2022-07-12 DIAGNOSIS — M25.662 DECREASED RANGE OF MOTION (ROM) OF LEFT KNEE: ICD-10-CM

## 2022-07-12 DIAGNOSIS — S82.102D CLOSED FRACTURE OF PROXIMAL END OF LEFT TIBIA WITH ROUTINE HEALING, UNSPECIFIED FRACTURE MORPHOLOGY, SUBSEQUENT ENCOUNTER: Primary | ICD-10-CM

## 2022-07-12 PROCEDURE — 97110 THERAPEUTIC EXERCISES: CPT | Mod: PO,CQ

## 2022-07-12 NOTE — PROGRESS NOTES
[facility]   Physical Therapy Treatment Note     [id number]    Therapy Diagnosis:   Encounter Diagnoses   Name Primary?    Closed fracture of proximal end of left tibia with routine healing, unspecified fracture morphology, subsequent encounter Yes    Decreased range of motion (ROM) of left knee     Muscle weakness of lower extremity      Physician: Martin Cleary NP    Visit Date: 7/12/2022    Physician Orders: PT Eval and Treat   Medical Diagnosis from Referral: Closed fracture of proximal end of left tibia with routine healing, unspecified fracture morphology, subsequent encounter [S82.102D]  Evaluation Date: 5/18/2022  Authorization Period Expiration:12/31/22  Plan of Care Expiration: 8/18/22  Progress Note Due: 7/22/22  Visit # / Visits authorized: 13/40  FOTO: 2/3       PTA Visit #: 1/5     Precautions: Standard and toe touch weight bearing, DVT, osteoporosis  PROCEDURE:Open reduction internal fixation left tibial plateau fracture       Time In: 7:45 AM  Time Out: 8:30 AM  Total Appointment Time (timed & untimed codes): 45 minutes       SUBJECTIVE     Pt reports: she has been able to walk around her home short distances without her RW and hasn't had much difficulty.  Pt also reports she slept upstairs last night and was able to ascend/ descend the stairs without difficulty.     She was compliant with home exercise program.  Response to previous treatment: Felt fine   Functional change: none noted     Pain: not rated/10 in akle, not rated/10 in the knee  Location: L leg     OBJECTIVE     MD note on 6/9/2022  - Continue therapy as ordered.  - Weight bearing, I will Barry her weight-bearing to weight bear as tolerated on hinged knee brace locked in extension for the 1st 2 weeks (6/23) and then advance range of motion 0-30 degrees x2 weeks (7/7) and increase by 30° every 2 weeks (7/21) thereafter until patient reaches 90° flexion and is  "able to bear weight.  Once she is at 90° she can discontinue the hinged knee brace.  - Range of motion as tolerated.        99 degrees knee flexion AROM on 7/1/22    Treatment     Gina received the treatments listed below:         therapeutic exercises to develop strength, endurance, ROM and flexibility for 45 minutes including:    Nustep 6' (discharge)  Quad sets 3x10 with 3" L (discharge to HEP today)  SAQ 3x10 over full foam roll L 1#  SLR 1# 3x10 L  Heel slides 3 mins with 3" holds (supine AAROM) (discharge to HEP today)  Supine knee PROM with heel prop with 1# above knee x 2 min  bridges 3x10  sidelying clamshells 2x10 B YTB  LAQ's 3 x 10 L 1#    With brace ON:     Heel raises x 30  Weight shifting M/L 2'  Mini squats x30   Step ups at stairs leading with L x20      Stair training for independent stair use in home. Instructed patient to ascend leading with R leg and descend leading with left leg for 0 minutes     Gait training 0' with RW on level surface to work on restoring gait mechanics as well as facilitating weight bearing through L LE since pt was cleared by MD to begin weight bearing.      Manual for 0 minutes:    Patellar mobilizations in all directions with patient education to perform at home  PROM into knee extension with tibial ER.            Patient Education and Home Exercises     Home Exercises Provided and Patient Education Provided     Education provided:   - pt educated on all interventions performed at today's visit    Written Home Exercises Provided: Patient instructed to cont prior HEP. Exercises were reviewed and Gina was able to demonstrate them prior to the end of the session.  Gina demonstrated good  understanding of the education provided. See EMR under Patient Instructions for exercises provided during therapy sessions    ASSESSMENT     Pt with no pain post treatment session.  Pt performed was able to perform increased repetitions on mini squats as we continue to work towards " improving her L LE strength.  Will continue to monitor and progress pt within her tolerance.     Gina Is progressing well towards her goals.   Pt prognosis is Good.     Pt will continue to benefit from skilled outpatient physical therapy to address the deficits listed in the problem list box on initial evaluation, provide pt/family education and to maximize pt's level of independence in the home and community environment.     Pt's spiritual, cultural and educational needs considered and pt agreeable to plan of care and goals.     Anticipated barriers to physical therapy: scheduling, NWB precautions, pain    Goals:     Short Term Goals:  4 weeks  (not met, progressing)  1.Report decreased L knee pain  < / =  4/10  to increase tolerance for therapy exercises  2. Increase knee flexion ROM by 10-20 degrees passively in order to be able to perform ADLs without difficulty. MET  3. Increase strength by 1/3 MMT grade in LLE  to increase tolerance for ADL and work activities.  4. Pt to tolerate HEP to improve ROM and independence with ADL's MET  5. Increase knee extension ROM by 5-10 degrees passively to improve gait mechanics when returning to weightbearing MET     Long Term Goals: 8 weeks (not met, progressing)  1.Report decreased L knee pain < / = 2/10  to increase tolerance for ambulation  2.Patient goal: Pt to tolerate 10 minute increments of standing without increase in knee pain in order to return to cooking.   3.Increase strength to >/= 4+/5 in LLE  to increase tolerance for ADL and work activities.  4. Pt will report at CJ level (20-40% impaired) on FOTO knee to demonstrate increase in LE function with every day tasks.   5. Pt to demonstrate L knee AROM equal to R in order to improve gait mechanics for regular exercise          PLAN     Continue PT POC    Ricardo Carrillo, PTA

## 2022-07-14 ENCOUNTER — PATIENT MESSAGE (OUTPATIENT)
Dept: ORTHOPEDICS | Facility: CLINIC | Age: 67
End: 2022-07-14
Payer: MEDICARE

## 2022-07-14 DIAGNOSIS — I82.452 ACUTE DEEP VEIN THROMBOSIS (DVT) OF LEFT PERONEAL VEIN: ICD-10-CM

## 2022-07-15 ENCOUNTER — CLINICAL SUPPORT (OUTPATIENT)
Dept: REHABILITATION | Facility: HOSPITAL | Age: 67
End: 2022-07-15
Payer: MEDICARE

## 2022-07-15 DIAGNOSIS — M62.81 MUSCLE WEAKNESS OF LOWER EXTREMITY: ICD-10-CM

## 2022-07-15 DIAGNOSIS — M25.662 DECREASED RANGE OF MOTION (ROM) OF LEFT KNEE: Primary | ICD-10-CM

## 2022-07-15 PROCEDURE — 97110 THERAPEUTIC EXERCISES: CPT | Mod: PO

## 2022-07-15 NOTE — PROGRESS NOTES
[facility]   Physical Therapy Treatment Note     [id number]    Therapy Diagnosis:   Encounter Diagnoses   Name Primary?    Decreased range of motion (ROM) of left knee Yes    Muscle weakness of lower extremity      Physician: Martin Cleary NP    Visit Date: 7/15/2022    Physician Orders: PT Eval and Treat   Medical Diagnosis from Referral: Closed fracture of proximal end of left tibia with routine healing, unspecified fracture morphology, subsequent encounter [S82.102D]  Evaluation Date: 5/18/2022  Authorization Period Expiration:12/31/22  Plan of Care Expiration: 8/18/22  Progress Note Due: 7/22/22  Visit # / Visits authorized: 14/40  FOTO: 2/3       PTA Visit #: 0/5     Precautions: Standard and toe touch weight bearing, DVT, osteoporosis  PROCEDURE:Open reduction internal fixation left tibial plateau fracture       Time In: 8:15 AM  Time Out: 8:59 AM  Total Appointment Time (timed & untimed codes): 44 minutes       SUBJECTIVE     Pt reports: very slight decreased in tingling in the foot reported today. No issues with brace unlocked to 60.     She was compliant with home exercise program.  Response to previous treatment: Felt fine   Functional change: none noted     Pain: not rated/10 in akle, not rated/10 in the knee  Location: L leg     OBJECTIVE     MD note on 6/9/2022  - Continue therapy as ordered.  - Weight bearing, I will Barry her weight-bearing to weight bear as tolerated on hinged knee brace locked in extension for the 1st 2 weeks (6/23) and then advance range of motion 0-30 degrees x2 weeks (7/7) and increase by 30° every 2 weeks (7/21) thereafter until patient reaches 90° flexion and is able to bear weight.  Once she is at 90° she can discontinue the hinged knee brace.  - Range of motion as tolerated.        99 degrees knee flexion AROM on 7/1/22    Treatment     Gina received the treatments listed below:   "       therapeutic exercises to develop strength, endurance, ROM and flexibility for 38 minutes including:    Nustep 6' (discharge)  Quad sets 3x10 with 3" L (discharge)  SAQ 3x10 over full foam roll L 2# (discharged to HEP today)  SLR 1# 3x10 L   Heel slides 3 mins with 3" holds (supine AAROM) (discharge)  Supine knee PROM with heel prop with 2# above knee x 2 min (discharged to HEP today)  bridges 3x10  sidelying clamshells 2x10 B RTB  LAQ's 3 x 10 L 2#    With brace ON:     Heel raises x 30  Weight shifting M/L 2'  Mini squats x30   Step ups at stairs leading with L x20      Stair training for independent stair use in home. Instructed patient to ascend leading with R leg and descend leading with left leg for 0 minutes     Gait training 0' with RW on level surface to work on restoring gait mechanics as well as facilitating weight bearing through L LE since pt was cleared by MD to begin weight bearing.      Manual for 6 minutes:    Patellar mobilizations in all directions with patient education to perform at home  PROM into knee extension with tibial ER.            Patient Education and Home Exercises     Home Exercises Provided and Patient Education Provided     Education provided:   - pt educated on all interventions performed at today's visit    Written Home Exercises Provided: Patient instructed to cont prior HEP. Exercises were reviewed and Gina was able to demonstrate them prior to the end of the session.  Gina demonstrated good  understanding of the education provided. See EMR under Patient Instructions for exercises provided during therapy sessions    ASSESSMENT     Gina continues to do well with brace unlocked at 60 degrees. She tolerated progressions well without any pain or other adverse effects. She was instructed to perform heel prop at home for knee extension ROM as well as patellar mobs on the L knee. Progress as tolerated.     Gina Is progressing well towards her goals.   Pt prognosis is Good. "     Pt will continue to benefit from skilled outpatient physical therapy to address the deficits listed in the problem list box on initial evaluation, provide pt/family education and to maximize pt's level of independence in the home and community environment.     Pt's spiritual, cultural and educational needs considered and pt agreeable to plan of care and goals.     Anticipated barriers to physical therapy: scheduling, NWB precautions, pain    Goals:     Short Term Goals:  4 weeks  (not met, progressing)  1.Report decreased L knee pain  < / =  4/10  to increase tolerance for therapy exercises  2. Increase knee flexion ROM by 10-20 degrees passively in order to be able to perform ADLs without difficulty. MET  3. Increase strength by 1/3 MMT grade in LLE  to increase tolerance for ADL and work activities.  4. Pt to tolerate HEP to improve ROM and independence with ADL's MET  5. Increase knee extension ROM by 5-10 degrees passively to improve gait mechanics when returning to weightbearing MET     Long Term Goals: 8 weeks (not met, progressing)  1.Report decreased L knee pain < / = 2/10  to increase tolerance for ambulation  2.Patient goal: Pt to tolerate 10 minute increments of standing without increase in knee pain in order to return to cooking.   3.Increase strength to >/= 4+/5 in LLE  to increase tolerance for ADL and work activities.  4. Pt will report at CJ level (20-40% impaired) on FOTO knee to demonstrate increase in LE function with every day tasks.   5. Pt to demonstrate L knee AROM equal to R in order to improve gait mechanics for regular exercise          PLAN     Continue PT POC    Wandy Lopez, PT

## 2022-07-20 ENCOUNTER — TELEPHONE (OUTPATIENT)
Dept: CARDIOLOGY | Facility: CLINIC | Age: 67
End: 2022-07-20

## 2022-07-20 ENCOUNTER — OFFICE VISIT (OUTPATIENT)
Dept: CARDIOLOGY | Facility: CLINIC | Age: 67
End: 2022-07-20
Payer: MEDICARE

## 2022-07-20 VITALS
HEART RATE: 54 BPM | BODY MASS INDEX: 21.71 KG/M2 | WEIGHT: 127.19 LBS | HEIGHT: 64 IN | DIASTOLIC BLOOD PRESSURE: 74 MMHG | SYSTOLIC BLOOD PRESSURE: 153 MMHG

## 2022-07-20 DIAGNOSIS — S82.102D CLOSED FRACTURE OF PROXIMAL END OF LEFT TIBIA WITH ROUTINE HEALING, UNSPECIFIED FRACTURE MORPHOLOGY, SUBSEQUENT ENCOUNTER: Primary | ICD-10-CM

## 2022-07-20 DIAGNOSIS — I82.452 ACUTE DEEP VEIN THROMBOSIS (DVT) OF LEFT PERONEAL VEIN: Primary | ICD-10-CM

## 2022-07-20 DIAGNOSIS — S82.102D CLOSED FRACTURE OF PROXIMAL END OF LEFT TIBIA WITH ROUTINE HEALING, UNSPECIFIED FRACTURE MORPHOLOGY, SUBSEQUENT ENCOUNTER: ICD-10-CM

## 2022-07-20 PROCEDURE — 1101F PT FALLS ASSESS-DOCD LE1/YR: CPT | Mod: CPTII,S$GLB,, | Performed by: INTERNAL MEDICINE

## 2022-07-20 PROCEDURE — 99999 PR PBB SHADOW E&M-EST. PATIENT-LVL III: CPT | Mod: PBBFAC,,, | Performed by: INTERNAL MEDICINE

## 2022-07-20 PROCEDURE — 99999 PR PBB SHADOW E&M-EST. PATIENT-LVL III: ICD-10-PCS | Mod: PBBFAC,,, | Performed by: INTERNAL MEDICINE

## 2022-07-20 PROCEDURE — 1101F PR PT FALLS ASSESS DOC 0-1 FALLS W/OUT INJ PAST YR: ICD-10-PCS | Mod: CPTII,S$GLB,, | Performed by: INTERNAL MEDICINE

## 2022-07-20 PROCEDURE — 3077F PR MOST RECENT SYSTOLIC BLOOD PRESSURE >= 140 MM HG: ICD-10-PCS | Mod: CPTII,S$GLB,, | Performed by: INTERNAL MEDICINE

## 2022-07-20 PROCEDURE — 1159F MED LIST DOCD IN RCRD: CPT | Mod: CPTII,S$GLB,, | Performed by: INTERNAL MEDICINE

## 2022-07-20 PROCEDURE — 1125F AMNT PAIN NOTED PAIN PRSNT: CPT | Mod: CPTII,S$GLB,, | Performed by: INTERNAL MEDICINE

## 2022-07-20 PROCEDURE — 3008F BODY MASS INDEX DOCD: CPT | Mod: CPTII,S$GLB,, | Performed by: INTERNAL MEDICINE

## 2022-07-20 PROCEDURE — 1125F PR PAIN SEVERITY QUANTIFIED, PAIN PRESENT: ICD-10-PCS | Mod: CPTII,S$GLB,, | Performed by: INTERNAL MEDICINE

## 2022-07-20 PROCEDURE — 3008F PR BODY MASS INDEX (BMI) DOCUMENTED: ICD-10-PCS | Mod: CPTII,S$GLB,, | Performed by: INTERNAL MEDICINE

## 2022-07-20 PROCEDURE — 3288F PR FALLS RISK ASSESSMENT DOCUMENTED: ICD-10-PCS | Mod: CPTII,S$GLB,, | Performed by: INTERNAL MEDICINE

## 2022-07-20 PROCEDURE — 99205 OFFICE O/P NEW HI 60 MIN: CPT | Mod: S$GLB,,, | Performed by: INTERNAL MEDICINE

## 2022-07-20 PROCEDURE — 3288F FALL RISK ASSESSMENT DOCD: CPT | Mod: CPTII,S$GLB,, | Performed by: INTERNAL MEDICINE

## 2022-07-20 PROCEDURE — 3077F SYST BP >= 140 MM HG: CPT | Mod: CPTII,S$GLB,, | Performed by: INTERNAL MEDICINE

## 2022-07-20 PROCEDURE — 3078F PR MOST RECENT DIASTOLIC BLOOD PRESSURE < 80 MM HG: ICD-10-PCS | Mod: CPTII,S$GLB,, | Performed by: INTERNAL MEDICINE

## 2022-07-20 PROCEDURE — 99205 PR OFFICE/OUTPT VISIT, NEW, LEVL V, 60-74 MIN: ICD-10-PCS | Mod: S$GLB,,, | Performed by: INTERNAL MEDICINE

## 2022-07-20 PROCEDURE — 1159F PR MEDICATION LIST DOCUMENTED IN MEDICAL RECORD: ICD-10-PCS | Mod: CPTII,S$GLB,, | Performed by: INTERNAL MEDICINE

## 2022-07-20 PROCEDURE — 3078F DIAST BP <80 MM HG: CPT | Mod: CPTII,S$GLB,, | Performed by: INTERNAL MEDICINE

## 2022-07-20 RX ORDER — CHOLECALCIFEROL (VITAMIN D3) 25 MCG
1000 TABLET ORAL DAILY
COMMUNITY

## 2022-07-20 NOTE — PATIENT INSTRUCTIONS
Assessment/Plan:  Niya Durbin is a 67 y.o. female with osteoporosis, LLE DVT, who presents for an initial appointment.     1. LLE DVT- Provoked by surgery.   LLE Venous Ultrasound on 5/13/2022 revealed nonocclusive DVT of the left posterior tibial and peroneal veins.  Continue Eliquis 5 mg bid.  Repeat LLE venous ultrasound after 3 months of therapy.      Follow up in 2 months with LLE venous ultrasound prior

## 2022-07-20 NOTE — TELEPHONE ENCOUNTER
Called pt and left a vmail letting know Dr. Law sept schedule is fully booked. The next appointment was [facility]. Advised pt to call back to let me know she have received message.  ----- Message from Madhuri Constantino sent at 7/20/2022 11:31 AM CDT -----  Regarding: Appt  Pt [phone] says she was told by the doctor to come back in Sept.    Thanks

## 2022-07-20 NOTE — PROGRESS NOTES
[facility]      Chief Complaint   Patient presents with    Deep Vein Thrombosis       Patient ID: Niya Durbin is a 67 y.o. female with osteoporosis, LLE DVT, who presents for an initial appointment.  Pertinent history/events are as follows:     -Pt kindly referred by Martin Cleary NP for evaluation of LLE DVT.     HPI:  Mrs. Durbin reports She underwent ORIF for her left tibial plateau fracture by Dr. James on 4/29/2022.  On 5/13/2022, she presented to clinic for post-op visit.  Her left lower leg was swollen with complaints of calf pain.  She was taking ASA 81 mg bid post op for DVT ppx.  LLE Venous Ultrasound on 5/13/2022 revealed nonocclusive DVT of the left posterior tibial and peroneal veins.  She was started on Eliquis 10 mg bid for first 7 days, then 5 mg bid thereafter.      Past Medical History:   Diagnosis Date    History of colon polyps     Hyperplastic    Ben Dmitry jaw-winking syndrome     No known health problems     Osteoporosis      Past Surgical History:   Procedure Laterality Date    APPENDECTOMY  age 10    COLONOSCOPY N/A 9/26/2019    Procedure: COLONOSCOPY/suprep;  Surgeon: Santo Hoyt MD;  [facility];  Service: Endoscopy;  Laterality: N/A;    OPEN REDUCTION AND INTERNAL FIXATION (ORIF) OF FRACTURE OF TIBIAL PLATEAU Left 4/29/2022    Procedure: ORIF, FRACTURE, TIBIA, PLATEAU;  Surgeon: Bal James MD;  Location: [address];  Service: Orthopedics;  Laterality: Left;    TOTAL ABDOMINAL HYSTERECTOMY  age 35    w/ USO     Social History     Socioeconomic History    Marital status:      Spouse name: Butch    Number of children: 0   Tobacco Use    Smoking status: Never Smoker    Smokeless tobacco: Never Used   Substance and Sexual Activity    Alcohol use: Yes     Alcohol/week: 2.0 standard drinks     Types: 1 Glasses of wine, 1 Cans of beer per week     Comment: occasionally    Drug use: No    Sexual activity:  "Not Currently   Social History Narrative    She is not satisfied with weight.    Rates diet as good.    She does drink at least 1/2 gallon water daily.    She drinks 2 coffee/tea/caffeine-containing soft drinks daily.    Total sleep time at night is 6-8 hours.    She does wear seat belts.    Hobbies include cooking.             Family History   Problem Relation Age of Onset    Stroke Mother     Stroke Father     Melanoma Neg Hx        Review of patient's allergies indicates:  No Known Allergies    Medication List with Changes/Refills   Current Medications    APIXABAN (ELIQUIS) 5 MG TAB    Take 1 tablet (5 mg total) by mouth 2 (two) times daily.    ASCORBIC ACID, VITAMIN C, (VITAMIN C) 100 MG TABLET    Take 100 mg by mouth once daily.    DENOSUMAB (PROLIA) 60 MG/ML SYRG    Inject 60 mg into the skin.    GABAPENTIN (NEURONTIN) 100 MG CAPSULE    Take 1 capsule (100 mg total) by mouth 3 (three) times daily.    TRIAMCINOLONE ACETONIDE 0.1% (KENALOG) 0.1 % CREAM    Apply to affected areas of hands and legs BID prn rash. Do not use on face, underarms, or groin.    VITAMIN D (VITAMIN D3) 1000 UNITS TAB    Take 1,000 Units by mouth once daily. Regular Vit D 1000mg       Review of Systems  Constitution: Denies chills, fever, and sweats.  HENT: Denies headaches or blurry vision.  Cardiovascular: Denies chest pain or irregular heart beat.  Respiratory: Denies cough or shortness of breath.  Gastrointestinal: Denies abdominal pain, nausea, or vomiting.  Musculoskeletal: Denies muscle cramps.  Neurological: Denies dizziness or focal weakness.  Psychiatric/Behavioral: Normal mental status.  Hematologic/Lymphatic: Denies bleeding problem or easy bruising/bleeding.  Skin: Denies rash or suspicious lesions    Physical Examination  BP (!) 153/74   Pulse (!) 54   Ht 5' 4" (1.626 m)   Wt 57.7 kg (127 lb 3.3 oz)   BMI 21.83 kg/m²     Constitutional: No acute distress, conversant  HEENT: Sclera anicteric, Pupils equal, round and " reactive to light, extraocular motions intact, Oropharynx clear  Neck: No JVD, no carotid bruits  Cardiovascular: regular rate and rhythm, no murmur, rubs or gallops, normal S1/S2  Pulmonary: Clear to auscultation bilaterally  Abdominal: Abdomen soft, nontender, nondistended, positive bowel sounds  Extremities: No lower extremity edema,   Pulses:  Carotid pulses are 2+ on the right side, and 2+ on the left side.  Radial pulses are 2+ on the right side, and 2+ on the left side.   Femoral pulses are 2+ on the right side, and 2+ on the left side.  Popliteal pulses are 2+ on the right side, and 2+ on the left side.   Dorsalis pedis pulses are 2+ on the right side, and 2+ on the left side.   Posterior tibial pulses are 2+ on the right side, and 2+ on the left side.    Skin: No ecchymosis, erythema, or ulcers  Psych: Alert and oriented x 3, appropriate affect  Neuro: CNII-XII intact, no focal deficits    Labs:  Most Recent Data  CBC:   Lab Results   Component Value Date    WBC 7.42 04/24/2022    HGB 11.3 (L) 04/24/2022    HCT 33.1 (L) 04/24/2022     04/24/2022    MCV 88 04/24/2022    RDW 12.0 04/24/2022     BMP:   Lab Results   Component Value Date     04/23/2022    K 4.1 04/23/2022     04/23/2022    CO2 23 04/23/2022    BUN 24 (H) 04/23/2022    CREATININE 0.8 04/23/2022     (H) 04/23/2022    CALCIUM 9.3 04/23/2022     LFTS;   Lab Results   Component Value Date    PROT 7.0 03/28/2022    ALBUMIN 4.3 03/28/2022    BILITOT 0.6 03/28/2022    AST 18 03/28/2022    ALKPHOS 51 (L) 03/28/2022    ALT 17 03/28/2022     COAGS: No results found for: INR, PROTIME, PTT  FLP:   Lab Results   Component Value Date    CHOL 172 02/04/2021    HDL 66 02/04/2021    LDLCALC 94.2 02/04/2021    TRIG 59 02/04/2021    CHOLHDL 38.4 02/04/2021     CARDIAC: No results found for: TROPONINI, CKMB, BNP    Imaging:    LLE Venous Ultrasound 5/13/2022:  · There is nonocclusive DVT of the left posterior tibial and peroneal  veins.  · The contralateral (right) common femoral vein is patent.  · Findings communicated to Martin Cleary NP (ordering provider) at 6:22 pm.    Assessment/Plan:  Niya Durbin is a 67 y.o. female with osteoporosis, LLE DVT, who presents for an initial appointment.     1. LLE DVT- Provoked by surgery.   LLE Venous Ultrasound on 5/13/2022 revealed nonocclusive DVT of the left posterior tibial and peroneal veins.  Continue Eliquis 5 mg bid.  Repeat LLE venous ultrasound after 3 months of therapy.      Follow up in 2 months with LLE venous ultrasound prior     Total duration of face to face visit time 30 minutes.  Total time spent counseling greater than fifty percent of total visit time.  Counseling included discussion regarding imaging findings, diagnosis, possibilities, treatment options, risks and benefits.  The patient had many questions regarding the options and long-term effects.    Thee Law MD, PhD  Interventional Cardiology

## 2022-07-21 ENCOUNTER — OFFICE VISIT (OUTPATIENT)
Dept: ORTHOPEDICS | Facility: CLINIC | Age: 67
End: 2022-07-21
Payer: MEDICARE

## 2022-07-21 ENCOUNTER — HOSPITAL ENCOUNTER (OUTPATIENT)
Dept: RADIOLOGY | Facility: HOSPITAL | Age: 67
Discharge: HOME | End: 2022-07-21
Attending: ORTHOPAEDIC SURGERY
Payer: MEDICARE

## 2022-07-21 DIAGNOSIS — G57.32 SUPERFICIAL PERONEAL NERVE NEUROPATHY, LEFT: ICD-10-CM

## 2022-07-21 DIAGNOSIS — S82.102D CLOSED FRACTURE OF PROXIMAL END OF LEFT TIBIA WITH ROUTINE HEALING, UNSPECIFIED FRACTURE MORPHOLOGY, SUBSEQUENT ENCOUNTER: ICD-10-CM

## 2022-07-21 DIAGNOSIS — S82.102D CLOSED FRACTURE OF PROXIMAL END OF LEFT TIBIA WITH ROUTINE HEALING, UNSPECIFIED FRACTURE MORPHOLOGY, SUBSEQUENT ENCOUNTER: Primary | ICD-10-CM

## 2022-07-21 PROCEDURE — 1160F RVW MEDS BY RX/DR IN RCRD: CPT | Mod: CPTII,S$GLB,, | Performed by: ORTHOPAEDIC SURGERY

## 2022-07-21 PROCEDURE — 99024 PR POST-OP FOLLOW-UP VISIT: ICD-10-PCS | Mod: S$GLB,,, | Performed by: ORTHOPAEDIC SURGERY

## 2022-07-21 PROCEDURE — 1159F MED LIST DOCD IN RCRD: CPT | Mod: CPTII,S$GLB,, | Performed by: ORTHOPAEDIC SURGERY

## 2022-07-21 PROCEDURE — 99024 POSTOP FOLLOW-UP VISIT: CPT | Mod: S$GLB,,, | Performed by: ORTHOPAEDIC SURGERY

## 2022-07-21 PROCEDURE — 1159F PR MEDICATION LIST DOCUMENTED IN MEDICAL RECORD: ICD-10-PCS | Mod: CPTII,S$GLB,, | Performed by: ORTHOPAEDIC SURGERY

## 2022-07-21 PROCEDURE — 1160F PR REVIEW ALL MEDS BY PRESCRIBER/CLIN PHARMACIST DOCUMENTED: ICD-10-PCS | Mod: CPTII,S$GLB,, | Performed by: ORTHOPAEDIC SURGERY

## 2022-07-21 PROCEDURE — 73560 XR KNEE 1 OR 2 VIEW LEFT: ICD-10-PCS | Mod: 26,LT,, | Performed by: RADIOLOGY

## 2022-07-21 PROCEDURE — 1125F PR PAIN SEVERITY QUANTIFIED, PAIN PRESENT: ICD-10-PCS | Mod: CPTII,S$GLB,, | Performed by: ORTHOPAEDIC SURGERY

## 2022-07-21 PROCEDURE — 73560 X-RAY EXAM OF KNEE 1 OR 2: CPT | Mod: 26,LT,, | Performed by: RADIOLOGY

## 2022-07-21 PROCEDURE — 1125F AMNT PAIN NOTED PAIN PRSNT: CPT | Mod: CPTII,S$GLB,, | Performed by: ORTHOPAEDIC SURGERY

## 2022-07-21 PROCEDURE — 99999 PR PBB SHADOW E&M-EST. PATIENT-LVL II: ICD-10-PCS | Mod: PBBFAC,,, | Performed by: ORTHOPAEDIC SURGERY

## 2022-07-21 PROCEDURE — 73560 X-RAY EXAM OF KNEE 1 OR 2: CPT | Mod: TC,LT

## 2022-07-21 PROCEDURE — 99999 PR PBB SHADOW E&M-EST. PATIENT-LVL II: CPT | Mod: PBBFAC,,, | Performed by: ORTHOPAEDIC SURGERY

## 2022-07-21 NOTE — PROGRESS NOTES
CC:  Postop ORIF L tibial plateau      HISTORY       HPI:  67-year-old female, fall 04/23/2022, left tibial plateau lateral split depression in the setting of osteoporotic bone.     04/29/2022- open reduction internal fixation left tibial plateau fracture    Now about 3 months postop  Knees doing pretty well  Has started putting weight on it, using a brace and a walker    Does have some superficial peroneal nerve paresthesias and some pain which is mostly on the top of her foot, 3/10, sharp, stabbing  Treated with gabapentin, 100 twice daily which does help though it does make her little drowsy    ROS:  Constitutional: Denies fever/chills  Neurological: Denies numbness/tingling (any exceptions noted in orthopaedic exam)   Psychiatric/Behavioral: Denies change in normal mood  Eyes: Denies change in vision  Cardiovascular: Denies chest pain  Respiratory: Denies shortness of breath  Hematologic/Lymphatic: Denies easy bleeding/bruising   Skin: Denies new rash or skin lesions   Gastrointestinal: Denies nausea/vomitting/diarrhea, change in bowel habits, abdominal pain   Allergic/Immunologic: Denies adverse reactions to current medications  Musculoskeletal: see HPI    PAST MEDICAL HISTORY:   Past Medical History:   Diagnosis Date    History of colon polyps     Hyperplastic    Ben Dmitry jaw-winking syndrome     No known health problems     Osteoporosis      PAST SURGICAL HISTORY:   Past Surgical History:   Procedure Laterality Date    APPENDECTOMY  age 10    COLONOSCOPY N/A 9/26/2019    Procedure: COLONOSCOPY/suprep;  Surgeon: Santo Hoyt MD;  Location: [facility];  Service: Endoscopy;  Laterality: N/A;    OPEN REDUCTION AND INTERNAL FIXATION (ORIF) OF FRACTURE OF TIBIAL PLATEAU Left 4/29/2022    Procedure: ORIF, FRACTURE, TIBIA, PLATEAU;  Surgeon: Bal James MD;  Location: [facility] OR [address] [place] [address];  Service: Orthopedics;  Laterality: Left;    TOTAL ABDOMINAL HYSTERECTOMY  age 35    w/ USO     FAMILY  HISTORY:   Family History   Problem Relation Age of Onset    Stroke Mother     Stroke Father     Melanoma Neg Hx      SOCIAL HISTORY:   Social History     Socioeconomic History    Marital status:      Spouse name: Butch    Number of children: 0   Tobacco Use    Smoking status: Never Smoker    Smokeless tobacco: Never Used   Substance and Sexual Activity    Alcohol use: Yes     Alcohol/week: 2.0 standard drinks     Types: 1 Glasses of wine, 1 Cans of beer per week     Comment: occasionally    Drug use: No    Sexual activity: Not Currently   Social History Narrative    She is not satisfied with weight.    Rates diet as good.    She does drink at least 1/2 gallon water daily.    She drinks 2 coffee/tea/caffeine-containing soft drinks daily.    Total sleep time at night is 6-8 hours.    She does wear seat belts.    Hobbies include cooking.             MEDICATIONS:   Current Outpatient Medications:     apixaban (ELIQUIS) 5 mg Tab, Take 1 tablet (5 mg total) by mouth 2 (two) times daily., Disp: 60 tablet, Rfl: 4    ascorbic acid, vitamin C, (VITAMIN C) 100 MG tablet, Take 100 mg by mouth once daily., Disp: , Rfl:     denosumab (PROLIA) 60 mg/mL Syrg, Inject 60 mg into the skin., Disp: , Rfl:     gabapentin (NEURONTIN) 100 MG capsule, Take 1 capsule (100 mg total) by mouth 3 (three) times daily. (Patient taking differently: Take 100 mg by mouth 2 (two) times daily.), Disp: 90 capsule, Rfl: 11    triamcinolone acetonide 0.1% (KENALOG) 0.1 % cream, Apply to affected areas of hands and legs BID prn rash. Do not use on face, underarms, or groin. (Patient taking differently: as needed. Apply to affected areas of hands and legs BID prn rash. Do not use on face, underarms, or groin.), Disp: 80 g, Rfl: 3    vitamin D (VITAMIN D3) 1000 units Tab, Take 1,000 Units by mouth once daily. Regular Vit D 1000mg, Disp: , Rfl:   No current facility-administered medications for this visit.    Facility-Administered  Medications Ordered in Other Visits:     0.9%  NaCl infusion, , Intravenous, Continuous, Santo Hoyt MD, Stopped at 09/26/19 1020    sodium chloride 0.9% flush 10 mL, 10 mL, Intravenous, PRN, Santo Hoyt MD  ALLERGIES: Review of patient's allergies indicates:  No Known Allergies      EXAM      VITAL SIGNS:   There were no vitals taken for this visit.      PE:  General:  no acute distress, appears stated age    Neuro: alert and oriented x3  Psych: normal mood  Head: normocephalic, atraumatic.   Eyes: no scleral icterus  Mouth: moist mucous membranes  Cardiovascular: extremities warm and well perfused  Lungs: breathing comfortably, equal chest rise bilat  Skin: clean, dry, intact (any exceptions noted in below musculoskeletal exam)    Musculoskeletal:  Left lower extremity  Well-healed surgical incisions left proximal knee and left lateral tibia  No Tinel sign over any scars  On incisions all well healed no signs of infection there is no areas of bony tenderness  Knee range of motion 5-100 flexion  No significant varus or valgus instability at extension or 30° flexion  Motor  4+/5 hip flexion, quad, hamstring  5/5 gastroc, tibialis anterior, peroneal, EHL, FHL  Sensation  Paresthesias in peroneal nerve distribution  intact to light touch saphenous, sural, deep peroneal, superficial peroneal, tibial nerves.  Palpable DP/PT pulse, brisk cap refill          XRAYS:  X-ray left knee demonstrate prior tibial plateau fracture, with hardware in place, stable alignment  (I independently reviewed and interpreted the above imaging)    MEDICAL DECISION MAKING       Encounter Diagnosis   Name Primary?    Closed fracture of proximal end of left tibia with routine healing, unspecified fracture morphology, subsequent encounter Yes       67-year-old female, fall 04/23/2022, left tibial plateau lateral split depression in the setting of osteoporotic bone.     04/29/2022- open reduction internal fixation left tibial plateau  fracture    Doing okay from the standpoint  X-rays stable  Incisions healed    Does have some superficial peroneal nerve paresthesias    Counseled patient that this could be multifactorial with contributing factors her injury, subsequent surgery with surgical dissection, manipulation, scar from healing fracture and surgical incisions, remnant paresthesias from surgical block.  I do not think it is possible to pinpoint 1 particular cause of the paresthesias    Recommend continuing gabapentin p.r.n., hopefully with time it will continue to improve, which it has over the past month or so    She is still walking in her brace, and with the lateral plateau injury and her age, poor bone quality, I think is very reasonable to transition to a lateral knee off , which will allow further support of this lateral joint line in hopes of improving her pain, mobility, preventing progression of lateral compartment arthritis    Can also use topical analgesics to leg and massage.    Weightbear as tolerated  Range of motion as tolerated  Continue PT    Follow-up 3 months  Weightbearing x-ray left knee        =====================  Bal James MD  Orthopaedic Surgery

## 2022-07-22 ENCOUNTER — CLINICAL SUPPORT (OUTPATIENT)
Dept: REHABILITATION | Facility: HOSPITAL | Age: 67
End: 2022-07-22
Payer: MEDICARE

## 2022-07-22 DIAGNOSIS — M25.662 DECREASED RANGE OF MOTION (ROM) OF LEFT KNEE: ICD-10-CM

## 2022-07-22 DIAGNOSIS — M62.81 MUSCLE WEAKNESS OF LOWER EXTREMITY: ICD-10-CM

## 2022-07-22 DIAGNOSIS — S82.102D CLOSED FRACTURE OF PROXIMAL END OF LEFT TIBIA WITH ROUTINE HEALING, UNSPECIFIED FRACTURE MORPHOLOGY, SUBSEQUENT ENCOUNTER: Primary | ICD-10-CM

## 2022-07-22 PROCEDURE — 97110 THERAPEUTIC EXERCISES: CPT | Mod: PO,CQ

## 2022-07-22 NOTE — PROGRESS NOTES
[facility]   Physical Therapy Treatment Note     [facility] [id number]    Therapy Diagnosis:   Encounter Diagnoses   Name Primary?    Closed fracture of proximal end of left tibia with routine healing, unspecified fracture morphology, subsequent encounter Yes    Decreased range of motion (ROM) of left knee     Muscle weakness of lower extremity      Physician: Martin Cleary NP    Visit Date: 7/22/2022    Physician Orders: PT Eval and Treat   Medical Diagnosis from Referral: Closed fracture of proximal end of left tibia with routine healing, unspecified fracture morphology, subsequent encounter [S82.102D]  Evaluation Date: 5/18/2022  Authorization Period Expiration:12/31/22  Plan of Care Expiration: 8/18/22  Progress Note Due: 7/22/22  Visit # / Visits authorized: 15/40  FOTO: 2/3       PTA Visit #: 1/5     Precautions: Standard and toe touch weight bearing, DVT, osteoporosis  PROCEDURE:Open reduction internal fixation left tibial plateau fracture       Time In: 8:15 AM  Time Out: 9:00 AM  Total Appointment Time (timed & untimed codes): 45 minutes       SUBJECTIVE     Pt reports: she went and saw her MD yesterday in which he unlocked her brace to 100 and cleared her for all activity and to restore full range of motion.      She was compliant with home exercise program.  Response to previous treatment: Felt fine   Functional change: none noted     Pain: not rated/10 in akle, not rated/10 in the knee  Location: L leg     OBJECTIVE     MD note on 6/9/2022  - Continue therapy as ordered.  - Weight bearing, I will Barry her weight-bearing to weight bear as tolerated on hinged knee brace locked in extension for the 1st 2 weeks (6/23) and then advance range of motion 0-30 degrees x2 weeks (7/7) and increase by 30° every 2 weeks (7/21) thereafter until patient reaches 90° flexion and is able to bear weight.  Once she is at 90° she can discontinue the hinged  "knee brace.  - Range of motion as tolerated.        99 degrees knee flexion AROM on 7/1/22    Treatment     Gina received the treatments listed below:         therapeutic exercises to develop strength, endurance, ROM and flexibility for 38 minutes including:    Nustep 6' (discharge)  Quad sets 3x10 with 3" L (discharge)  SAQ 3x10 over full foam roll L 2# (discharged to HEP today)  SLR 1# 3x10 L   Heel slides 3 mins with 3" holds (supine AAROM) (discharge)  Supine knee PROM with heel prop with 2# above knee x 2 min (discharged to HEP today)  bridges 3x10  sidelying clamshells 3x10 B RTB  LAQ's 3 x 10 L 2#    With brace ON:     Heel raises x 30  Weight shifting M/L 2'  Mini squats x30   Step ups at stairs leading with L x 15  Double leg shuttle press 1 blk band x 20       Stair training for independent stair use in home. Instructed patient to ascend leading with R leg and descend leading with left leg for 0 minutes     Gait training 0' with RW on level surface to work on restoring gait mechanics as well as facilitating weight bearing through L LE since pt was cleared by MD to begin weight bearing.      Manual for 0 minutes:    Patellar mobilizations in all directions with patient education to perform at home  PROM into knee extension with tibial ER.            Patient Education and Home Exercises     Home Exercises Provided and Patient Education Provided     Education provided:   - pt educated on all interventions performed at today's visit    Written Home Exercises Provided: Patient instructed to cont prior HEP. Exercises were reviewed and Gina was able to demonstrate them prior to the end of the session.  Gina demonstrated good  understanding of the education provided. See EMR under Patient Instructions for exercises provided during therapy sessions    ASSESSMENT     Gina ambulated into the clinic with her brace unlocked to 100 degrees and reports it was done by her MD which he gave her new instructions which " are in the subjective above.  Shuttle presses were added this morning to continue to work on improving her L LE strength.  Will continue to monitor and progress pt within her tolerance.     Gina Is progressing well towards her goals.   Pt prognosis is Good.     Pt will continue to benefit from skilled outpatient physical therapy to address the deficits listed in the problem list box on initial evaluation, provide pt/family education and to maximize pt's level of independence in the home and community environment.     Pt's spiritual, cultural and educational needs considered and pt agreeable to plan of care and goals.     Anticipated barriers to physical therapy: scheduling, NWB precautions, pain    Goals:     Short Term Goals:  4 weeks  (not met, progressing)  1.Report decreased L knee pain  < / =  4/10  to increase tolerance for therapy exercises  2. Increase knee flexion ROM by 10-20 degrees passively in order to be able to perform ADLs without difficulty. MET  3. Increase strength by 1/3 MMT grade in LLE  to increase tolerance for ADL and work activities.  4. Pt to tolerate HEP to improve ROM and independence with ADL's MET  5. Increase knee extension ROM by 5-10 degrees passively to improve gait mechanics when returning to weightbearing MET     Long Term Goals: 8 weeks (not met, progressing)  1.Report decreased L knee pain < / = 2/10  to increase tolerance for ambulation  2.Patient goal: Pt to tolerate 10 minute increments of standing without increase in knee pain in order to return to cooking.   3.Increase strength to >/= 4+/5 in LLE  to increase tolerance for ADL and work activities.  4. Pt will report at CJ level (20-40% impaired) on FOTO knee to demonstrate increase in LE function with every day tasks.   5. Pt to demonstrate L knee AROM equal to R in order to improve gait mechanics for regular exercise          PLAN     Continue PT POC    Ricardo Carrillo, PTA

## 2022-07-26 ENCOUNTER — CLINICAL SUPPORT (OUTPATIENT)
Dept: REHABILITATION | Facility: HOSPITAL | Age: 67
End: 2022-07-26
Payer: MEDICARE

## 2022-07-26 DIAGNOSIS — M25.662 DECREASED RANGE OF MOTION (ROM) OF LEFT KNEE: ICD-10-CM

## 2022-07-26 DIAGNOSIS — M62.81 MUSCLE WEAKNESS OF LOWER EXTREMITY: ICD-10-CM

## 2022-07-26 DIAGNOSIS — S82.102D CLOSED FRACTURE OF PROXIMAL END OF LEFT TIBIA WITH ROUTINE HEALING, UNSPECIFIED FRACTURE MORPHOLOGY, SUBSEQUENT ENCOUNTER: Primary | ICD-10-CM

## 2022-07-26 PROCEDURE — 97110 THERAPEUTIC EXERCISES: CPT | Mod: PO,CQ

## 2022-07-26 NOTE — PROGRESS NOTES
[facility]   Physical Therapy Treatment Note     [id number]    Therapy Diagnosis:   Encounter Diagnoses   Name Primary?    Closed fracture of proximal end of left tibia with routine healing, unspecified fracture morphology, subsequent encounter Yes    Decreased range of motion (ROM) of left knee     Muscle weakness of lower extremity      Physician: Martin Cleary NP    Visit Date: 7/26/2022    Physician Orders: PT Eval and Treat   Medical Diagnosis from Referral: Closed fracture of proximal end of left tibia with routine healing, unspecified fracture morphology, subsequent encounter [S82.102D]  Evaluation Date: 5/18/2022  Authorization Period Expiration:12/31/22  Plan of Care Expiration: 8/18/22  Progress Note Due: 7/22/22  Visit # / Visits authorized: 16/40  FOTO: 2/3       PTA Visit #: 2/5     Precautions: Standard and toe touch weight bearing, DVT, osteoporosis  PROCEDURE:Open reduction internal fixation left tibial plateau fracture       Time In: 7:45 AM  Time Out: 8:25 AM  Total Appointment Time (timed & untimed codes): 40 minutes       SUBJECTIVE     Pt reports: she has weaned down to one gabapentin today vs. 2.     She was compliant with home exercise program.  Response to previous treatment: Felt fine   Functional change: none noted     Pain: not rated/10 in akle, not rated/10 in the knee  Location: L leg     OBJECTIVE     MD note on 6/9/2022  - Continue therapy as ordered.  - Weight bearing, I will Barry her weight-bearing to weight bear as tolerated on hinged knee brace locked in extension for the 1st 2 weeks (6/23) and then advance range of motion 0-30 degrees x2 weeks (7/7) and increase by 30° every 2 weeks (7/21) thereafter until patient reaches 90° flexion and is able to bear weight.  Once she is at 90° she can discontinue the hinged knee brace.  - Range of motion as tolerated.        99 degrees knee flexion AROM on  "7/1/22    Treatment     Gina received the treatments listed below:         therapeutic exercises to develop strength, endurance, ROM and flexibility for 38 minutes including:    Nustep 6' (discharge)  Quad sets 3x10 with 3" L (discharge)  SAQ 3x10 over full foam roll L 2# (discharged to HEP today)  SLR 1# 3x10 L   Heel slides 3 mins with 3" holds (supine AAROM) (discharge)  Supine knee PROM with heel prop with 2# above knee x 2 min (discharged to HEP today)  bridges 3x10  sidelying clamshells 3x10 B RTB  LAQ's 3 x 10 L 2#    With brace ON:     Heel raises x 30  Weight shifting M/L 2'  Mini squats x30   Step ups at stairs leading with L x 20  Double leg shuttle press 1 blk band x 30       Stair training for independent stair use in home. Instructed patient to ascend leading with R leg and descend leading with left leg for 0 minutes     Gait training 0' with RW on level surface to work on restoring gait mechanics as well as facilitating weight bearing through L LE since pt was cleared by MD to begin weight bearing.      Manual for 0 minutes:    Patellar mobilizations in all directions with patient education to perform at home  PROM into knee extension with tibial ER.            Patient Education and Home Exercises     Home Exercises Provided and Patient Education Provided     Education provided:   - pt educated on all interventions performed at today's visit    Written Home Exercises Provided: Patient instructed to cont prior HEP. Exercises were reviewed and Gina was able to demonstrate them prior to the end of the session.  Gina demonstrated good  understanding of the education provided. See EMR under Patient Instructions for exercises provided during therapy sessions    ASSESSMENT     Pt was able to tolerate increased repetitions on shuttle presses as well as step ups.  Pt with no reports of pain post treatment session.  Will continue to monitor and progress pt within her tolerance.     Gina Is progressing well " towards her goals.   Pt prognosis is Good.     Pt will continue to benefit from skilled outpatient physical therapy to address the deficits listed in the problem list box on initial evaluation, provide pt/family education and to maximize pt's level of independence in the home and community environment.     Pt's spiritual, cultural and educational needs considered and pt agreeable to plan of care and goals.     Anticipated barriers to physical therapy: scheduling, NWB precautions, pain    Goals:     Short Term Goals:  4 weeks  (not met, progressing)  1.Report decreased L knee pain  < / =  4/10  to increase tolerance for therapy exercises  2. Increase knee flexion ROM by 10-20 degrees passively in order to be able to perform ADLs without difficulty. MET  3. Increase strength by 1/3 MMT grade in LLE  to increase tolerance for ADL and work activities.  4. Pt to tolerate HEP to improve ROM and independence with ADL's MET  5. Increase knee extension ROM by 5-10 degrees passively to improve gait mechanics when returning to weightbearing MET     Long Term Goals: 8 weeks (not met, progressing)  1.Report decreased L knee pain < / = 2/10  to increase tolerance for ambulation  2.Patient goal: Pt to tolerate 10 minute increments of standing without increase in knee pain in order to return to cooking.   3.Increase strength to >/= 4+/5 in LLE  to increase tolerance for ADL and work activities.  4. Pt will report at CJ level (20-40% impaired) on FOTO knee to demonstrate increase in LE function with every day tasks.   5. Pt to demonstrate L knee AROM equal to R in order to improve gait mechanics for regular exercise          PLAN     Continue PT POC    Ricardo Carrillo, PTA

## 2022-07-29 ENCOUNTER — CLINICAL SUPPORT (OUTPATIENT)
Dept: REHABILITATION | Facility: HOSPITAL | Age: 67
End: 2022-07-29
Payer: MEDICARE

## 2022-07-29 DIAGNOSIS — M25.662 DECREASED RANGE OF MOTION (ROM) OF LEFT KNEE: ICD-10-CM

## 2022-07-29 DIAGNOSIS — S82.102D CLOSED FRACTURE OF PROXIMAL END OF LEFT TIBIA WITH ROUTINE HEALING, UNSPECIFIED FRACTURE MORPHOLOGY, SUBSEQUENT ENCOUNTER: Primary | ICD-10-CM

## 2022-07-29 DIAGNOSIS — M62.81 MUSCLE WEAKNESS OF LOWER EXTREMITY: ICD-10-CM

## 2022-07-29 PROCEDURE — 97110 THERAPEUTIC EXERCISES: CPT | Mod: PO,CQ

## 2022-07-29 NOTE — PROGRESS NOTES
[facility]   Physical Therapy Treatment Note     [id number]    Therapy Diagnosis:   Encounter Diagnoses   Name Primary?    Closed fracture of proximal end of left tibia with routine healing, unspecified fracture morphology, subsequent encounter Yes    Decreased range of motion (ROM) of left knee     Muscle weakness of lower extremity      Physician: Martin Cleary NP    Visit Date: 7/29/2022    Physician Orders: PT Eval and Treat   Medical Diagnosis from Referral: Closed fracture of proximal end of left tibia with routine healing, unspecified fracture morphology, subsequent encounter [S82.102D]  Evaluation Date: 5/18/2022  Authorization Period Expiration:12/31/22  Plan of Care Expiration: 8/18/22  Progress Note Due: 7/22/22  Visit # / Visits authorized: 17/40  FOTO: 2/3       PTA Visit #: 3/5     Precautions: Standard and toe touch weight bearing, DVT, osteoporosis  PROCEDURE:Open reduction internal fixation left tibial plateau fracture       Time In: 7:30 AM  Time Out: 8:15 AM  Total Appointment Time (timed & untimed codes): 45 minutes       SUBJECTIVE     Pt reports: she feels like she is definitely getting better.      She was compliant with home exercise program.  Response to previous treatment: Felt fine   Functional change: none noted     Pain: not rated/10 in akle, not rated/10 in the knee  Location: L leg     OBJECTIVE     MD note on 6/9/2022  - Continue therapy as ordered.  - Weight bearing, I will Barry her weight-bearing to weight bear as tolerated on hinged knee brace locked in extension for the 1st 2 weeks (6/23) and then advance range of motion 0-30 degrees x2 weeks (7/7) and increase by 30° every 2 weeks (7/21) thereafter until patient reaches 90° flexion and is able to bear weight.  Once she is at 90° she can discontinue the hinged knee brace.  - Range of motion as tolerated.        99 degrees knee flexion AROM on  "7/1/22    Treatment     Gina received the treatments listed below:         therapeutic exercises to develop strength, endurance, ROM and flexibility for 38 minutes including:    Nustep 6' (discharge)  Quad sets 3x10 with 3" L (discharge)  SAQ 3x10 over full foam roll L 2# (discharged to HEP today)  SLR 1# 3x10 L   Heel slides 3 mins with 3" holds (supine AAROM) (discharge)  Supine knee PROM with heel prop with 2# above knee x 2 min (discharged to HEP today)  bridges 3x10  sidelying clamshells 3x10 B RTB  LAQ's 3 x 10 L 2#    With brace ON:     Heel raises x 30  Weight shifting M/L 2'  Mini squats x30   Step ups at stairs leading with L x 20  Double leg shuttle press 2 blk band x 30   Single leg shuttle press 1 red chord x 20         Stair training for independent stair use in home. Instructed patient to ascend leading with R leg and descend leading with left leg for 0 minutes     Gait training 0' with RW on level surface to work on restoring gait mechanics as well as facilitating weight bearing through L LE since pt was cleared by MD to begin weight bearing.      Manual for 0 minutes:    Patellar mobilizations in all directions with patient education to perform at home  PROM into knee extension with tibial ER.            Patient Education and Home Exercises     Home Exercises Provided and Patient Education Provided     Education provided:   - pt educated on all interventions performed at today's visit    Written Home Exercises Provided: Patient instructed to cont prior HEP. Exercises were reviewed and Gina was able to demonstrate them prior to the end of the session.  Gina demonstrated good  understanding of the education provided. See EMR under Patient Instructions for exercises provided during therapy sessions    ASSESSMENT      Pt performed increased resistance on double leg shuttle press this morning as we continue to work towards improving her LE strength.  Single leg shuttle presses were added as well this " morning.  Pt with no pain post treatment session.  Will continue to monitor and progress pt within her tolerance.     Gina Is progressing well towards her goals.   Pt prognosis is Good.     Pt will continue to benefit from skilled outpatient physical therapy to address the deficits listed in the problem list box on initial evaluation, provide pt/family education and to maximize pt's level of independence in the home and community environment.     Pt's spiritual, cultural and educational needs considered and pt agreeable to plan of care and goals.     Anticipated barriers to physical therapy: scheduling, NWB precautions, pain    Goals:     Short Term Goals:  4 weeks  (not met, progressing)  1.Report decreased L knee pain  < / =  4/10  to increase tolerance for therapy exercises  2. Increase knee flexion ROM by 10-20 degrees passively in order to be able to perform ADLs without difficulty. MET  3. Increase strength by 1/3 MMT grade in LLE  to increase tolerance for ADL and work activities.  4. Pt to tolerate HEP to improve ROM and independence with ADL's MET  5. Increase knee extension ROM by 5-10 degrees passively to improve gait mechanics when returning to weightbearing MET     Long Term Goals: 8 weeks (not met, progressing)  1.Report decreased L knee pain < / = 2/10  to increase tolerance for ambulation  2.Patient goal: Pt to tolerate 10 minute increments of standing without increase in knee pain in order to return to cooking.   3.Increase strength to >/= 4+/5 in LLE  to increase tolerance for ADL and work activities.  4. Pt will report at CJ level (20-40% impaired) on FOTO knee to demonstrate increase in LE function with every day tasks.   5. Pt to demonstrate L knee AROM equal to R in order to improve gait mechanics for regular exercise          PLAN     Continue PT POC    Ricardo Carrillo, PTA

## 2022-08-02 ENCOUNTER — CLINICAL SUPPORT (OUTPATIENT)
Dept: REHABILITATION | Facility: HOSPITAL | Age: 67
End: 2022-08-02
Payer: MEDICARE

## 2022-08-02 DIAGNOSIS — M25.662 DECREASED RANGE OF MOTION (ROM) OF LEFT KNEE: ICD-10-CM

## 2022-08-02 DIAGNOSIS — M62.81 MUSCLE WEAKNESS OF LOWER EXTREMITY: ICD-10-CM

## 2022-08-02 DIAGNOSIS — S82.102D CLOSED FRACTURE OF PROXIMAL END OF LEFT TIBIA WITH ROUTINE HEALING, UNSPECIFIED FRACTURE MORPHOLOGY, SUBSEQUENT ENCOUNTER: Primary | ICD-10-CM

## 2022-08-02 PROCEDURE — 97110 THERAPEUTIC EXERCISES: CPT | Mod: PO,CQ

## 2022-08-02 NOTE — PROGRESS NOTES
[facility]   Physical Therapy Treatment Note     [id number]    Therapy Diagnosis:   Encounter Diagnoses   Name Primary?    Closed fracture of proximal end of left tibia with routine healing, unspecified fracture morphology, subsequent encounter Yes    Decreased range of motion (ROM) of left knee     Muscle weakness of lower extremity      Physician: Martin Cleary NP    Visit Date: 8/2/2022    Physician Orders: PT Eval and Treat   Medical Diagnosis from Referral: Closed fracture of proximal end of left tibia with routine healing, unspecified fracture morphology, subsequent encounter [S82.102D]  Evaluation Date: 5/18/2022  Authorization Period Expiration:12/31/22  Plan of Care Expiration: 8/18/22  Progress Note Due: 7/22/22  Visit # / Visits authorized: 18/40  FOTO: 2/3       PTA Visit #: 4/5     Precautions: Standard and toe touch weight bearing, DVT, osteoporosis  PROCEDURE:Open reduction internal fixation left tibial plateau fracture       Time In: 7:45 AM  Time Out: 8:30 AM  Total Appointment Time (timed & untimed codes): 45 minutes       SUBJECTIVE     Pt reports:   No new issues or concerns at this time.     She was compliant with home exercise program.  Response to previous treatment: Felt fine   Functional change: none noted     Pain: not rated/10 in akle, not rated/10 in the knee  Location: L leg     OBJECTIVE     MD note on 6/9/2022  - Continue therapy as ordered.  - Weight bearing, I will Barry her weight-bearing to weight bear as tolerated on hinged knee brace locked in extension for the 1st 2 weeks (6/23) and then advance range of motion 0-30 degrees x2 weeks (7/7) and increase by 30° every 2 weeks (7/21) thereafter until patient reaches 90° flexion and is able to bear weight.  Once she is at 90° she can discontinue the hinged knee brace.  - Range of motion as tolerated.        99 degrees knee flexion AROM on  "7/1/22    Treatment     Gina received the treatments listed below:         therapeutic exercises to develop strength, endurance, ROM and flexibility for 45 minutes including:    Recumbent bike 8'   Nustep 6' (discharge)  Quad sets 3x10 with 3" L (discharge)  SAQ 3x10 over full foam roll L 2# (discharged to HEP today)  SLR 1# 3x10 L   Heel slides 3 mins with 3" holds (supine AAROM) (discharge)  Supine knee PROM with heel prop with 2# above knee x 2 min (discharged to HEP today)  bridges 3x10  sidelying clamshells 3x10 B RTB  LAQ's 3 x 10 L 2#    With brace ON:     Heel raises x 30  Weight shifting M/L 2'  Mini squats x30   Step ups at stairs leading with L x 30  Double leg shuttle press 2 blk band x 30   Single leg shuttle press 1 blk chord x 30   Step downs 2in step         Stair training for independent stair use in home. Instructed patient to ascend leading with R leg and descend leading with left leg for 0 minutes     Gait training 0' with RW on level surface to work on restoring gait mechanics as well as facilitating weight bearing through L LE since pt was cleared by MD to begin weight bearing.      Manual for 0 minutes:    Patellar mobilizations in all directions with patient education to perform at home  PROM into knee extension with tibial ER.            Patient Education and Home Exercises     Home Exercises Provided and Patient Education Provided     Education provided:   - pt educated on all interventions performed at today's visit    Written Home Exercises Provided: Patient instructed to cont prior HEP. Exercises were reviewed and Gina was able to demonstrate them prior to the end of the session.  Gina demonstrated good  understanding of the education provided. See EMR under Patient Instructions for exercises provided during therapy sessions    ASSESSMENT     Eccentric step downs were added to improve pt's eccentric control and L LE strength.  Pt with no pain post treatment session.  Will continue to " monitor and progress pt within her tolerance.     Gina Is progressing well towards her goals.   Pt prognosis is Good.     Pt will continue to benefit from skilled outpatient physical therapy to address the deficits listed in the problem list box on initial evaluation, provide pt/family education and to maximize pt's level of independence in the home and community environment.     Pt's spiritual, cultural and educational needs considered and pt agreeable to plan of care and goals.     Anticipated barriers to physical therapy: scheduling, NWB precautions, pain    Goals:     Short Term Goals:  4 weeks  (not met, progressing)  1.Report decreased L knee pain  < / =  4/10  to increase tolerance for therapy exercises  2. Increase knee flexion ROM by 10-20 degrees passively in order to be able to perform ADLs without difficulty. MET  3. Increase strength by 1/3 MMT grade in LLE  to increase tolerance for ADL and work activities.  4. Pt to tolerate HEP to improve ROM and independence with ADL's MET  5. Increase knee extension ROM by 5-10 degrees passively to improve gait mechanics when returning to weightbearing MET     Long Term Goals: 8 weeks (not met, progressing)  1.Report decreased L knee pain < / = 2/10  to increase tolerance for ambulation  2.Patient goal: Pt to tolerate 10 minute increments of standing without increase in knee pain in order to return to cooking.   3.Increase strength to >/= 4+/5 in LLE  to increase tolerance for ADL and work activities.  4. Pt will report at CJ level (20-40% impaired) on FOTO knee to demonstrate increase in LE function with every day tasks.   5. Pt to demonstrate L knee AROM equal to R in order to improve gait mechanics for regular exercise          PLAN     Continue PT POC    Ricardo Carrillo, PTA

## 2022-08-04 ENCOUNTER — PATIENT MESSAGE (OUTPATIENT)
Dept: ORTHOPEDICS | Facility: CLINIC | Age: 67
End: 2022-08-04
Payer: MEDICARE

## 2022-08-04 NOTE — PROGRESS NOTES
[facility]   Physical Therapy Treatment Note / Reassessment    Name: Niya Durbin  Clinic Number: [id number]    Therapy Diagnosis:   Encounter Diagnoses   Name Primary?    Decreased range of motion (ROM) of left knee Yes    Muscle weakness of lower extremity      Physician: Martin Cleary NP    Visit Date: 8/5/2022    Physician Orders: PT Eval and Treat   Medical Diagnosis from Referral: Closed fracture of proximal end of left tibia with routine healing, unspecified fracture morphology, subsequent encounter [S82.102D]  Evaluation Date: 5/18/2022  Authorization Period Expiration:12/31/22  Plan of Care Expiration: 8/18/22  Progress Note Due: 9/5/22  Visit # / Visits authorized: 18/40  FOTO: 2/3       PTA Visit #: 4/5     Precautions: Standard and toe touch weight bearing, DVT, osteoporosis  PROCEDURE:Open reduction internal fixation left tibial plateau fracture       Time In: 8:15  Time Out: 8:57  Total Appointment Time (timed & untimed codes): 42 minutes       SUBJECTIVE     Pt reports:   No new issues or concerns at this time.     She was compliant with home exercise program.  Response to previous treatment: Felt fine   Functional change: none noted     Pain: not rated/10 in akle, not rated/10 in the knee  Location: L leg     OBJECTIVE     MD note on 6/9/2022  - Continue therapy as ordered.  - Weight bearing, I will Barry her weight-bearing to weight bear as tolerated on hinged knee brace locked in extension for the 1st 2 weeks (6/23) and then advance range of motion 0-30 degrees x2 weeks (7/7) and increase by 30° every 2 weeks (7/21) thereafter until patient reaches 90° flexion and is able to bear weight.  Once she is at 90° she can discontinue the hinged knee brace.  - Range of motion as tolerated.        Range of Motion (Passive):   Knee Right  Left    Flexion    Extension NT Lacking 2      Range of Motion (Active):   Knee Right  Left    Flexion 133 107   Extension  "0 Lacking 4         Lower Extremity Strength  Right LE   Left LE     Quadriceps: 5/5 Quadriceps: 2-/5   Hamstrings: 4+/5 Hamstrings: 2-/5   Hip flexion (seated): 4+/5 Hip flexion (seated): NT   Hip extension:  NT Hip extension: NT   Hip Abduction: 4-/5 Hip Abduction:  3+/5          Treatment     Gina received the treatments listed below:         therapeutic exercises to develop strength, endurance, ROM and flexibility for 42 minutes including:    Assessment as above  PLEASE PERFORM FOTO AT NEXT VISIT  Recumbent bike 8'   Nustep 6' (discharge)  Quad sets 3x10 with 3" L (discharge)  SAQ 3x10 over full foam roll L 2# (discharged to HEP today)  SLR 1# 3x10 L   Heel slides 3 mins with 3" holds (supine AAROM) (discharge)  Supine knee PROM with heel prop with 2# above knee x 2 min (discharged to HEP today)  bridges 3x10  sidelying clamshells 3x10 B RTB  LAQ's 3 x 10 L 2#    With brace ON:     Heel raises x 30  Weight shifting M/L 2'  TRX squats x20   Star taps lateral and retro with yellow dowel for balance x15 each B  Step ups at stairs leading with L x 20  Double leg shuttle press 2 blk band x 20   Single leg shuttle press 1 blk chord x 30   Step downs from bottom step of stair case with OTB for abduction x15 L     Reviewed clamshells, bridges, and sidelying hip abduction to be performed at home daily. 2x10      Manual for 0 minutes:  Patellar mobilizations in all directions with patient education to perform at home  PROM into knee extension with tibial ER.            Patient Education and Home Exercises     Home Exercises Provided and Patient Education Provided     Education provided:   - pt educated on all interventions performed at today's visit    Written Home Exercises Provided: Patient instructed to cont prior HEP. Exercises were reviewed and Gina was able to demonstrate them prior to the end of the session.  Gina demonstrated good  understanding of the education provided. See EMR under Patient Instructions for " exercises provided during therapy sessions    ASSESSMENT     Pt is awaiting lateral offloading brace which was ordered by surgeon on 7/21/22. Will remain in hinged knee brace unlocked until transition to offloading brace.   Upon reassessment today, ROM improving very well in both flexion and extension actively and passively. No change in MMT grades secondary to passive motion more than 5 degrees greater than active. Progressed mini squats to TRX squats today and added star taps with good challenge noted. Pt remains with difficulty and discomfrot with eccentric quad control. Plan to progress towards single limb and balance activities with continued quad, hamstring, and hip strengthening.     Gina Is progressing well towards her goals.   Pt prognosis is Good.     Pt will continue to benefit from skilled outpatient physical therapy to address the deficits listed in the problem list box on initial evaluation, provide pt/family education and to maximize pt's level of independence in the home and community environment.     Pt's spiritual, cultural and educational needs considered and pt agreeable to plan of care and goals.     Anticipated barriers to physical therapy: scheduling, NWB precautions, pain    Goals:     Short Term Goals:  4 weeks  (not met, progressing)  1.Report decreased L knee pain  < / =  4/10  to increase tolerance for therapy exercises MET  2. Increase knee flexion ROM by 10-20 degrees passively in order to be able to perform ADLs without difficulty. MET  3. Increase strength by 1/3 MMT grade in LLE  to increase tolerance for ADL and work activities.  4. Pt to tolerate HEP to improve ROM and independence with ADL's MET  5. Increase knee extension ROM by 5-10 degrees passively to improve gait mechanics when returning to weightbearing MET     Long Term Goals: 8 weeks (not met, progressing)  1.Report decreased L knee pain < / = 2/10  to increase tolerance for ambulation MET  2.Patient goal: Pt to tolerate  10 minute increments of standing without increase in knee pain in order to return to cooking.   3.Increase strength to >/= 4+/5 in LLE  to increase tolerance for ADL and work activities.  4. Pt will report at CJ level (20-40% impaired) on FOTO knee to demonstrate increase in LE function with every day tasks.   5. Pt to demonstrate L knee AROM equal to R in order to improve gait mechanics for regular exercise          PLAN     Continue PT POC    Wandy Lopez, PT

## 2022-08-05 ENCOUNTER — CLINICAL SUPPORT (OUTPATIENT)
Dept: REHABILITATION | Facility: HOSPITAL | Age: 67
End: 2022-08-05
Payer: MEDICARE

## 2022-08-05 DIAGNOSIS — M62.81 MUSCLE WEAKNESS OF LOWER EXTREMITY: ICD-10-CM

## 2022-08-05 DIAGNOSIS — M25.662 DECREASED RANGE OF MOTION (ROM) OF LEFT KNEE: Primary | ICD-10-CM

## 2022-08-05 PROCEDURE — 97110 THERAPEUTIC EXERCISES: CPT | Mod: PO

## 2022-08-09 ENCOUNTER — CLINICAL SUPPORT (OUTPATIENT)
Dept: REHABILITATION | Facility: HOSPITAL | Age: 67
End: 2022-08-09
Payer: MEDICARE

## 2022-08-09 DIAGNOSIS — M25.662 DECREASED RANGE OF MOTION (ROM) OF LEFT KNEE: ICD-10-CM

## 2022-08-09 DIAGNOSIS — S82.102D CLOSED FRACTURE OF PROXIMAL END OF LEFT TIBIA WITH ROUTINE HEALING, UNSPECIFIED FRACTURE MORPHOLOGY, SUBSEQUENT ENCOUNTER: Primary | ICD-10-CM

## 2022-08-09 DIAGNOSIS — M62.81 MUSCLE WEAKNESS OF LOWER EXTREMITY: ICD-10-CM

## 2022-08-09 PROCEDURE — 97110 THERAPEUTIC EXERCISES: CPT | Mod: PO,CQ

## 2022-08-09 NOTE — PROGRESS NOTES
"  [facility]   Physical Therapy Treatment Note / Reassessment    Name: Niya Durbin  Clinic Number: [id number]    Therapy Diagnosis:   Encounter Diagnoses   Name Primary?    Closed fracture of proximal end of left tibia with routine healing, unspecified fracture morphology, subsequent encounter Yes    Decreased range of motion (ROM) of left knee     Muscle weakness of lower extremity      Physician: Martin Cleary NP    Visit Date: 8/9/2022    Physician Orders: PT Eval and Treat   Medical Diagnosis from Referral: Closed fracture of proximal end of left tibia with routine healing, unspecified fracture morphology, subsequent encounter [S82.102D]  Evaluation Date: 5/18/2022  Authorization Period Expiration:12/31/22  Plan of Care Expiration: 8/18/22  Progress Note Due: 9/5/22  Visit # / Visits authorized: 20/40  FOTO: 2/3       PTA Visit #: 1/5     Precautions: Standard and toe touch weight bearing, DVT, osteoporosis  PROCEDURE:Open reduction internal fixation left tibial plateau fracture       Time In: 7:45  Time Out: 8:30  Total Appointment Time (timed & untimed codes): 45 minutes       SUBJECTIVE     Pt reports: her leg is feeling better     She was compliant with home exercise program.  Response to previous treatment: Felt fine   Functional change: none noted     Pain: not rated/10 in akle, not rated/10 in the knee  Location: L leg     OBJECTIVE            Treatment     Gina received the treatments listed below:         therapeutic exercises to develop strength, endurance, ROM and flexibility for 45 minutes including:    Assessment as above  PLEASE PERFORM FOTO AT NEXT VISIT  Recumbent bike 8'   Nustep 6' (discharge)  Quad sets 3x10 with 3" L (discharge)  SAQ 3x10 over full foam roll L 2# (discharged to HEP today)  SLR 1# 3x10 L   Heel slides 3 mins with 3" holds (supine AAROM) (discharge)  Supine knee PROM with heel prop with 2# above knee x 2 min (discharged to HEP " today)  bridges 3x10  sidelying clamshells 3x10 B RTB  LAQ's 3 x 10 L 2#    With brace ON:     Heel raises x 30  Weight shifting M/L 2'  TRX squats x20   Star taps lateral and retro with yellow dowel for balance x15 each B  Step ups at stairs leading with L x 20  Double leg shuttle press 2 blk band x 30   Single leg shuttle press 1 blk chord x 30   Step downs from bottom step of stair case with OTB for abduction x15 L     Reviewed clamshells, bridges, and sidelying hip abduction to be performed at home daily. 2x10      Manual for 0 minutes:  Patellar mobilizations in all directions with patient education to perform at home  PROM into knee extension with tibial ER.            Patient Education and Home Exercises     Home Exercises Provided and Patient Education Provided     Education provided:   - pt educated on all interventions performed at today's visit    Written Home Exercises Provided: Patient instructed to cont prior HEP. Exercises were reviewed and Gina was able to demonstrate them prior to the end of the session.  Gina demonstrated good  understanding of the education provided. See EMR under Patient Instructions for exercises provided during therapy sessions    ASSESSMENT     Pt with no pain post treatment session.  Pt performed the above exercises with good tolerance and was able to maintain appropriate exercise form throughout treatment session.  Will continue to monitor and progress pt within her tolerance.     Gina Is progressing well towards her goals.   Pt prognosis is Good.     Pt will continue to benefit from skilled outpatient physical therapy to address the deficits listed in the problem list box on initial evaluation, provide pt/family education and to maximize pt's level of independence in the home and community environment.     Pt's spiritual, cultural and educational needs considered and pt agreeable to plan of care and goals.     Anticipated barriers to physical therapy: scheduling, NWB  precautions, pain    Goals:     Short Term Goals:  4 weeks  (not met, progressing)  1.Report decreased L knee pain  < / =  4/10  to increase tolerance for therapy exercises MET  2. Increase knee flexion ROM by 10-20 degrees passively in order to be able to perform ADLs without difficulty. MET  3. Increase strength by 1/3 MMT grade in LLE  to increase tolerance for ADL and work activities.  4. Pt to tolerate HEP to improve ROM and independence with ADL's MET  5. Increase knee extension ROM by 5-10 degrees passively to improve gait mechanics when returning to weightbearing MET     Long Term Goals: 8 weeks (not met, progressing)  1.Report decreased L knee pain < / = 2/10  to increase tolerance for ambulation MET  2.Patient goal: Pt to tolerate 10 minute increments of standing without increase in knee pain in order to return to cooking.   3.Increase strength to >/= 4+/5 in LLE  to increase tolerance for ADL and work activities.  4. Pt will report at CJ level (20-40% impaired) on FOTO knee to demonstrate increase in LE function with every day tasks.   5. Pt to demonstrate L knee AROM equal to R in order to improve gait mechanics for regular exercise          PLAN     Continue PT POC    Ricardo Carrillo, PTA

## 2022-08-12 ENCOUNTER — CLINICAL SUPPORT (OUTPATIENT)
Dept: REHABILITATION | Facility: HOSPITAL | Age: 67
End: 2022-08-12
Payer: MEDICARE

## 2022-08-12 DIAGNOSIS — M25.662 DECREASED RANGE OF MOTION (ROM) OF LEFT KNEE: ICD-10-CM

## 2022-08-12 DIAGNOSIS — M62.81 MUSCLE WEAKNESS OF LOWER EXTREMITY: ICD-10-CM

## 2022-08-12 DIAGNOSIS — S82.162D CLOSED TORUS FRACTURE OF PROXIMAL END OF LEFT TIBIA WITH ROUTINE HEALING, SUBSEQUENT ENCOUNTER: Primary | ICD-10-CM

## 2022-08-12 PROCEDURE — 97110 THERAPEUTIC EXERCISES: CPT | Mod: PO

## 2022-08-12 NOTE — PROGRESS NOTES
[facility]   Physical Therapy Treatment Note / Reassessment    Name: Niya Durbin  Clinic Number: [id number]    Therapy Diagnosis:   Encounter Diagnoses   Name Primary?    Closed torus fracture of proximal end of left tibia with routine healing, subsequent encounter Yes    Decreased range of motion (ROM) of left knee     Muscle weakness of lower extremity      Physician: Martin Cleary NP    Visit Date: 8/12/2022    Physician Orders: PT Eval and Treat   Medical Diagnosis from Referral: Closed fracture of proximal end of left tibia with routine healing, unspecified fracture morphology, subsequent encounter [S82.102D]  Evaluation Date: 5/18/2022  Authorization Period Expiration:12/31/22  Plan of Care Expiration: 8/18/22  Progress Note Due: 9/5/22  Visit # / Visits authorized: 20/40  FOTO: 3/3       PTA Visit #: 1/5     Precautions: Standard and toe touch weight bearing, DVT, osteoporosis  PROCEDURE:Open reduction internal fixation left tibial plateau fracture       Time In: 0815  Time Out: 0900  Total Appointment Time (timed & untimed codes): 45 minutes       SUBJECTIVE     Pt reports:shes getting better every day.  She was compliant with home exercise program.  Response to previous treatment: Felt fine   Functional change: none noted     Pain: not rated/10 in akle, not rated/10 in the knee  Location: L leg     OBJECTIVE      Objective Measures updated at progress report unless specified.         Treatment     Gina received the treatments listed below:         therapeutic exercises to develop strength, endurance, ROM and flexibility for 45 minutes including:    Recumbent bike 8'   SLR 1# 3x10 L   bridges 3x10  sidelying clamshells 3x10 B GTB  LAQ's 3 x 10 L 2#    With brace ON:   Heel raises x 30  Weight shifting M/L 2'  TRX squats x20   Star taps lateral and retro with yellow dowel for balance x15 each B  Step ups at stairs leading with L x 20  Double leg shuttle press 2  blk band x 30   Single leg shuttle press 1 blk chord x 30   Step downs from bottom step of stair case with OTB for abduction x15 L       Manual for 0 minutes:  Patellar mobilizations in all directions with patient education to perform at home  PROM into knee extension with tibial ER.            Patient Education and Home Exercises     Home Exercises Provided and Patient Education Provided     Education provided:   - pt educated on all interventions performed at today's visit    Written Home Exercises Provided: Patient instructed to cont prior HEP. Exercises were reviewed and Gina was able to demonstrate them prior to the end of the session.  Gina demonstrated good  understanding of the education provided. See EMR under Patient Instructions for exercises provided during therapy sessions    ASSESSMENT   Pt tolerated session very well, she displays good form with familiar exercises.       Gina Is progressing well towards her goals.   Pt prognosis is Good.     Pt will continue to benefit from skilled outpatient physical therapy to address the deficits listed in the problem list box on initial evaluation, provide pt/family education and to maximize pt's level of independence in the home and community environment.     Pt's spiritual, cultural and educational needs considered and pt agreeable to plan of care and goals.     Anticipated barriers to physical therapy: scheduling, NWB precautions, pain    Goals:     Short Term Goals:  4 weeks  (not met, progressing)  1.Report decreased L knee pain  < / =  4/10  to increase tolerance for therapy exercises MET  2. Increase knee flexion ROM by 10-20 degrees passively in order to be able to perform ADLs without difficulty. MET  3. Increase strength by 1/3 MMT grade in LLE  to increase tolerance for ADL and work activities.  4. Pt to tolerate HEP to improve ROM and independence with ADL's MET  5. Increase knee extension ROM by 5-10 degrees passively to improve gait mechanics when  returning to weightbearing MET     Long Term Goals: 8 weeks (not met, progressing)  1.Report decreased L knee pain < / = 2/10  to increase tolerance for ambulation MET  2.Patient goal: Pt to tolerate 10 minute increments of standing without increase in knee pain in order to return to cooking.   3.Increase strength to >/= 4+/5 in LLE  to increase tolerance for ADL and work activities.  4. Pt will report at CJ level (20-40% impaired) on FOTO knee to demonstrate increase in LE function with every day tasks.   5. Pt to demonstrate L knee AROM equal to R in order to improve gait mechanics for regular exercise          PLAN     Continue PT POC    Aisha Chacon, PT

## 2022-08-16 ENCOUNTER — CLINICAL SUPPORT (OUTPATIENT)
Dept: REHABILITATION | Facility: HOSPITAL | Age: 67
End: 2022-08-16
Payer: MEDICARE

## 2022-08-16 DIAGNOSIS — M25.662 DECREASED RANGE OF MOTION (ROM) OF LEFT KNEE: ICD-10-CM

## 2022-08-16 DIAGNOSIS — M62.81 MUSCLE WEAKNESS OF LOWER EXTREMITY: ICD-10-CM

## 2022-08-16 DIAGNOSIS — S82.102D CLOSED FRACTURE OF PROXIMAL END OF LEFT TIBIA WITH ROUTINE HEALING, UNSPECIFIED FRACTURE MORPHOLOGY, SUBSEQUENT ENCOUNTER: Primary | ICD-10-CM

## 2022-08-16 PROCEDURE — 97110 THERAPEUTIC EXERCISES: CPT | Mod: PO,CQ

## 2022-08-16 NOTE — PROGRESS NOTES
[facility]   Physical Therapy Treatment Note / Reassessment    Name: Niya Durbin  Clinic Number: [id number]    Therapy Diagnosis:   Encounter Diagnoses   Name Primary?    Closed fracture of proximal end of left tibia with routine healing, unspecified fracture morphology, subsequent encounter Yes    Decreased range of motion (ROM) of left knee     Muscle weakness of lower extremity      Physician: aMrtin Cleary NP    Visit Date: 8/16/2022    Physician Orders: PT Eval and Treat   Medical Diagnosis from Referral: Closed fracture of proximal end of left tibia with routine healing, unspecified fracture morphology, subsequent encounter [S82.102D]  Evaluation Date: 5/18/2022  Authorization Period Expiration:12/31/22  Plan of Care Expiration: 8/18/22  Progress Note Due: 9/5/22  Visit # / Visits authorized: 22/40  FOTO: 3/3       PTA Visit #: 1/5     Precautions: Standard and toe touch weight bearing, DVT, osteoporosis  PROCEDURE:Open reduction internal fixation left tibial plateau fracture       Time In: 0745 am  Time Out: 0830 am  Total Appointment Time (timed & untimed codes): 45 minutes       SUBJECTIVE     Pt reports: her L knee has been a little sore due to going up and down her stairs at home but overall she feels like she is doing much better.   She was compliant with home exercise program.  Response to previous treatment: Felt fine   Functional change: Ongoing.     Pain: not rated/10 in akle, not rated/10 in the knee  Location: L leg     OBJECTIVE      Objective Measures updated at progress report unless specified.         Treatment     Gina received the treatments listed below:         therapeutic exercises to develop strength, endurance, ROM and flexibility for 45 minutes including:    Recumbent bike 8'   SLR 1# 3x10 L   bridges 3x10  sidelying clamshells 3x10 B GTB  LAQ's 3 x 10 L 2#    With brace ON:     Heel raises x 30  Weight shifting M/L 2'  TRX squats x20    Star taps lateral and retro with yellow dowel for balance x15 each B  Step ups at stairs leading with L x 20  Double leg shuttle press 3 blk band x 30   Single leg shuttle press 2 blk chord x 30   Step downs from bottom step of stair case with OTB for abduction x15 L       Manual for 0 minutes:  Patellar mobilizations in all directions with patient education to perform at home  PROM into knee extension with tibial ER.            Patient Education and Home Exercises     Home Exercises Provided and Patient Education Provided     Education provided:   - pt educated on all interventions performed at today's visit    Written Home Exercises Provided: Patient instructed to cont prior HEP. Exercises were reviewed and Gina was able to demonstrate them prior to the end of the session.  Gina demonstrated good  understanding of the education provided. See EMR under Patient Instructions for exercises provided during therapy sessions    ASSESSMENT     Increased resistance was added for double and single leg shuttle presses.  Pt continue's to do well with eccentric as well as stability exercises.  Will continue to monitor and progress pt within her tolerance.        Gina Is progressing well towards her goals.   Pt prognosis is Good.     Pt will continue to benefit from skilled outpatient physical therapy to address the deficits listed in the problem list box on initial evaluation, provide pt/family education and to maximize pt's level of independence in the home and community environment.     Pt's spiritual, cultural and educational needs considered and pt agreeable to plan of care and goals.     Anticipated barriers to physical therapy: scheduling, NWB precautions, pain    Goals:     Short Term Goals:  4 weeks  (not met, progressing)  1.Report decreased L knee pain  < / =  4/10  to increase tolerance for therapy exercises MET  2. Increase knee flexion ROM by 10-20 degrees passively in order to be able to perform ADLs without  difficulty. MET  3. Increase strength by 1/3 MMT grade in LLE  to increase tolerance for ADL and work activities.  4. Pt to tolerate HEP to improve ROM and independence with ADL's MET  5. Increase knee extension ROM by 5-10 degrees passively to improve gait mechanics when returning to weightbearing MET     Long Term Goals: 8 weeks (not met, progressing)  1.Report decreased L knee pain < / = 2/10  to increase tolerance for ambulation MET  2.Patient goal: Pt to tolerate 10 minute increments of standing without increase in knee pain in order to return to cooking.   3.Increase strength to >/= 4+/5 in LLE  to increase tolerance for ADL and work activities.  4. Pt will report at CJ level (20-40% impaired) on FOTO knee to demonstrate increase in LE function with every day tasks.   5. Pt to demonstrate L knee AROM equal to R in order to improve gait mechanics for regular exercise          PLAN     Continue PT POC    Ricardo Carrillo, PTA

## 2022-08-19 ENCOUNTER — CLINICAL SUPPORT (OUTPATIENT)
Dept: REHABILITATION | Facility: HOSPITAL | Age: 67
End: 2022-08-19
Payer: MEDICARE

## 2022-08-19 DIAGNOSIS — M25.662 DECREASED RANGE OF MOTION (ROM) OF LEFT KNEE: Primary | ICD-10-CM

## 2022-08-19 DIAGNOSIS — M62.81 MUSCLE WEAKNESS OF LOWER EXTREMITY: ICD-10-CM

## 2022-08-19 PROCEDURE — 97110 THERAPEUTIC EXERCISES: CPT | Mod: PO

## 2022-08-19 NOTE — PROGRESS NOTES
"  [facility]   Physical Therapy Treatment Note     Name: Niya Soloriowald  Clinic Number: [id number]    Therapy Diagnosis:   Encounter Diagnoses   Name Primary?    Decreased range of motion (ROM) of left knee Yes    Muscle weakness of lower extremity      Physician: Martin Cleary NP    Visit Date: 8/19/2022    Physician Orders: PT Eval and Treat   Medical Diagnosis from Referral: Closed fracture of proximal end of left tibia with routine healing, unspecified fracture morphology, subsequent encounter [S82.102D]  Evaluation Date: 5/18/2022  Authorization Period Expiration:12/31/22  Plan of Care Expiration: 8/18/22  Progress Note Due: 9/5/22  Visit # / Visits authorized: 23/40  FOTO: 3/3       PTA Visit #: 0/5     Precautions: Standard and toe touch weight bearing, DVT, osteoporosis  PROCEDURE:Open reduction internal fixation left tibial plateau fracture       Time In: 8:24  Time Out: 9:17  Total Appointment Time (timed & untimed codes): 53 minutes       SUBJECTIVE     Pt reports: had some soreness in the knee and ankle earlier this week and after last visit but is feeling better today.    She was compliant with home exercise program.  Response to previous treatment: Felt fine   Functional change: Ongoing.     Pain: 0/10 in ankle, 0/10 in the knee  Location: L leg     OBJECTIVE      Objective Measures updated at progress report unless specified.     FOTO 35% on 8/19/22    Treatment     Gina received the treatments listed below:         therapeutic exercises to develop strength, endurance, ROM and flexibility for 53 minutes including:    Recumbent bike 6'  Level 2  SLR 1# 3x10 L   bridges 3x10  sidelying clamshells 3x10 B GTB  sidelying hip ER x20 B  Prone quad stretch 3x30" L   LAQ's 3 x 10 L 2#      With brace ON:  Heel raises x 30  Standing hamstring curls x20 B  Standing hip abduction x20 B  Weight shifting M/L 2'  TRX squats x20   Star taps lateral and retro with yellow dowel " for balance x15 each B  Step ups at stairs leading with L x 20  Double leg shuttle press 3 blk band x 20   Single leg shuttle press 2 blk chord x 20 B  Step downs from bottom step of stair case with OTB for abduction x15 B       Manual for 0 minutes:  Patellar mobilizations in all directions with patient education to perform at home  PROM into knee extension with tibial ER.            Patient Education and Home Exercises     Home Exercises Provided and Patient Education Provided     Education provided:   - pt educated on all interventions performed at today's visit    Written Home Exercises Provided: Patient instructed to cont prior HEP. Exercises were reviewed and Gina was able to demonstrate them prior to the end of the session.  Gina demonstrated good  understanding of the education provided. See EMR under Patient Instructions for exercises provided during therapy sessions    ASSESSMENT     Gina continues to tolerate PT sessions well and has good response to increased hip strengthening activity. She continues to wear hinged knee brace while she waits on offloader brace to come in. Progress as tolerated.       Gina Is progressing well towards her goals.   Pt prognosis is Good.     Pt will continue to benefit from skilled outpatient physical therapy to address the deficits listed in the problem list box on initial evaluation, provide pt/family education and to maximize pt's level of independence in the home and community environment.     Pt's spiritual, cultural and educational needs considered and pt agreeable to plan of care and goals.     Anticipated barriers to physical therapy: scheduling, NWB precautions, pain    Goals:     Short Term Goals:  4 weeks  (not met, progressing)  1.Report decreased L knee pain  < / =  4/10  to increase tolerance for therapy exercises MET  2. Increase knee flexion ROM by 10-20 degrees passively in order to be able to perform ADLs without difficulty. MET  3. Increase strength by  1/3 MMT grade in LLE  to increase tolerance for ADL and work activities.  4. Pt to tolerate HEP to improve ROM and independence with ADL's MET  5. Increase knee extension ROM by 5-10 degrees passively to improve gait mechanics when returning to weightbearing MET     Long Term Goals: 8 weeks (not met, progressing)  1.Report decreased L knee pain < / = 2/10  to increase tolerance for ambulation MET  2.Patient goal: Pt to tolerate 10 minute increments of standing without increase in knee pain in order to return to cooking.   3.Increase strength to >/= 4+/5 in LLE  to increase tolerance for ADL and work activities.  4. Pt will report at CJ level (20-40% impaired) on FOTO knee to demonstrate increase in LE function with every day tasks.   5. Pt to demonstrate L knee AROM equal to R in order to improve gait mechanics for regular exercise          PLAN     Continue PT POC    Wandy Lopez, PT

## 2022-08-23 ENCOUNTER — CLINICAL SUPPORT (OUTPATIENT)
Dept: REHABILITATION | Facility: HOSPITAL | Age: 67
End: 2022-08-23
Payer: MEDICARE

## 2022-08-23 DIAGNOSIS — S82.102D CLOSED FRACTURE OF PROXIMAL END OF LEFT TIBIA WITH ROUTINE HEALING, UNSPECIFIED FRACTURE MORPHOLOGY, SUBSEQUENT ENCOUNTER: Primary | ICD-10-CM

## 2022-08-23 DIAGNOSIS — M62.81 MUSCLE WEAKNESS OF LOWER EXTREMITY: ICD-10-CM

## 2022-08-23 DIAGNOSIS — M25.662 DECREASED RANGE OF MOTION (ROM) OF LEFT KNEE: ICD-10-CM

## 2022-08-23 PROCEDURE — 97110 THERAPEUTIC EXERCISES: CPT | Mod: PO,CQ

## 2022-08-23 NOTE — PROGRESS NOTES
"  [facility]   Physical Therapy Treatment Note     Name: Niya Soloriowald  Clinic Number: [id number]    Therapy Diagnosis:   Encounter Diagnoses   Name Primary?    Closed fracture of proximal end of left tibia with routine healing, unspecified fracture morphology, subsequent encounter Yes    Decreased range of motion (ROM) of left knee     Muscle weakness of lower extremity      Physician: Martin Cleary NP    Visit Date: 8/23/2022    Physician Orders: PT Eval and Treat   Medical Diagnosis from Referral: Closed fracture of proximal end of left tibia with routine healing, unspecified fracture morphology, subsequent encounter [S82.102D]  Evaluation Date: 5/18/2022  Authorization Period Expiration:12/31/22  Plan of Care Expiration: 8/18/22  Progress Note Due: 9/5/22  Visit # / Visits authorized: 24/40  FOTO: 3/3       PTA Visit #: 1/5     Precautions: Standard and toe touch weight bearing, DVT, osteoporosis  PROCEDURE:Open reduction internal fixation left tibial plateau fracture       Time In: 7:45  Time Out: 8:25  Total Appointment Time (timed & untimed codes): 40 minutes       SUBJECTIVE     Pt reports:  She continue's to feel like she is improving.   She was compliant with home exercise program.  Response to previous treatment: Felt fine   Functional change: Ongoing.     Pain: 0/10 in ankle, 0/10 in the knee  Location: L leg     OBJECTIVE      Objective Measures updated at progress report unless specified.     FOTO 35% on 8/19/22    Treatment     Gina received the treatments listed below:         therapeutic exercises to develop strength, endurance, ROM and flexibility for 40 minutes including:    Recumbent bike 8'  Level 2  SLR 1# 3x10 L   bridges 3x10  sidelying clamshells 3x10 B GTB  sidelying hip ER x20 B  Prone quad stretch 3x30" L   LAQ's 3 x 10 L 2#      With brace ON:    Heel raises x 30  Standing hamstring curls x30 B  Standing hip abduction x30 B  Weight shifting M/L " 2'  TRX squats x 30   Star taps lateral and retro with yellow dowel for balance x15 each B  Step ups at stairs leading with L x 20  Double leg shuttle press 4 blk band x 30   Single leg shuttle press 2 blk chord x 30 B  Step downs from bottom step of stair case with OTB for abduction x20 B       Manual for 0 minutes:    Patellar mobilizations in all directions with patient education to perform at home  PROM into knee extension with tibial ER.            Patient Education and Home Exercises     Home Exercises Provided and Patient Education Provided     Education provided:   - pt educated on all interventions performed at today's visit    Written Home Exercises Provided: Patient instructed to cont prior HEP. Exercises were reviewed and Gina was able to demonstrate them prior to the end of the session.  Gina demonstrated good  understanding of the education provided. See EMR under Patient Instructions for exercises provided during therapy sessions    ASSESSMENT     Pt performed increased repetitions on all the above exercises without reports of pain.  Pt continue's to respond well to her current PT POC.  Will continue to monitor and progress pt within her tolerance.       Gina Is progressing well towards her goals.   Pt prognosis is Good.     Pt will continue to benefit from skilled outpatient physical therapy to address the deficits listed in the problem list box on initial evaluation, provide pt/family education and to maximize pt's level of independence in the home and community environment.     Pt's spiritual, cultural and educational needs considered and pt agreeable to plan of care and goals.     Anticipated barriers to physical therapy: scheduling, NWB precautions, pain    Goals:     Short Term Goals:  4 weeks  (not met, progressing)  1.Report decreased L knee pain  < / =  4/10  to increase tolerance for therapy exercises MET  2. Increase knee flexion ROM by 10-20 degrees passively in order to be able to  perform ADLs without difficulty. MET  3. Increase strength by 1/3 MMT grade in LLE  to increase tolerance for ADL and work activities.  4. Pt to tolerate HEP to improve ROM and independence with ADL's MET  5. Increase knee extension ROM by 5-10 degrees passively to improve gait mechanics when returning to weightbearing MET     Long Term Goals: 8 weeks (not met, progressing)  1.Report decreased L knee pain < / = 2/10  to increase tolerance for ambulation MET  2.Patient goal: Pt to tolerate 10 minute increments of standing without increase in knee pain in order to return to cooking.   3.Increase strength to >/= 4+/5 in LLE  to increase tolerance for ADL and work activities.  4. Pt will report at CJ level (20-40% impaired) on FOTO knee to demonstrate increase in LE function with every day tasks.   5. Pt to demonstrate L knee AROM equal to R in order to improve gait mechanics for regular exercise          PLAN     Continue PT POC    Ricardo Carrillo, PTA

## 2022-08-26 ENCOUNTER — CLINICAL SUPPORT (OUTPATIENT)
Dept: REHABILITATION | Facility: HOSPITAL | Age: 67
End: 2022-08-26
Payer: MEDICARE

## 2022-08-26 DIAGNOSIS — M62.81 MUSCLE WEAKNESS OF LOWER EXTREMITY: ICD-10-CM

## 2022-08-26 DIAGNOSIS — M25.662 DECREASED RANGE OF MOTION (ROM) OF LEFT KNEE: Primary | ICD-10-CM

## 2022-08-26 PROCEDURE — 97110 THERAPEUTIC EXERCISES: CPT | Mod: PO

## 2022-08-26 NOTE — PROGRESS NOTES
[facility]   Physical Therapy Treatment Note     [id number]    Therapy Diagnosis:   Encounter Diagnoses   Name Primary?    Decreased range of motion (ROM) of left knee Yes    Muscle weakness of lower extremity      Physician: Martin Cleary NP    Visit Date: 8/26/2022    Physician Orders: PT Eval and Treat   Medical Diagnosis from Referral: Closed fracture of proximal end of left tibia with routine healing, unspecified fracture morphology, subsequent encounter [S82.102D]  Evaluation Date: 5/18/2022  Authorization Period Expiration:12/31/22  Plan of Care Expiration: 8/18/22  Progress Note Due: 9/5/22  Visit # / Visits authorized: 25/40   FOTO: 3/3       PTA Visit #: 0/5     Precautions: Standard and toe touch weight bearing, DVT, osteoporosis  PROCEDURE:Open reduction internal fixation left tibial plateau fracture       Time In: 9:00  Time Out: 9:45  Total Appointment Time (timed & untimed codes): 45 minutes       SUBJECTIVE     Pt reports:  Has some soreness today. Has been walking up and down the stairs which is getting easier.   She was compliant with home exercise program.  Response to previous treatment: mild soreness  Functional change: Ongoing.     Pain: 0/10 in ankle, 0/10 in the knee  Location: L leg     OBJECTIVE      Objective Measures updated at progress report unless specified.     FOTO 35% on 8/19/22    Treatment     Gina received the treatments listed below:         therapeutic exercises to develop strength, endurance, ROM and flexibility for 45 minutes including:    Recumbent bike 8'  Level 2  SLR 1# 3x10 L   bridges 3x10  sidelying clamshells 3x10 B GTB  sidelying hip ER x20 B  Prone quad stretch 3x1 min L   LAQ's 3 x 10 L 2#      With brace ON:    Heel raises x 30  Standing hamstring curls x30 B  Standing hip abduction x30 B  Weight shifting M/L 2'  TRX squats x 30   Sit to stands from bench with 10# KB at chest  2x10  Star taps lateral and retro with yellow dowel for balance x15 each B  Step ups at stairs leading with L x 20  Double leg shuttle press 3 blk band x 30   Single leg shuttle press 2 blk chord x 30 B  Step downs from bottom step of stair case with OTB for abduction x20 B       Manual for 0 minutes:    Patellar mobilizations in all directions with patient education to perform at home  PROM into knee extension with tibial ER.            Patient Education and Home Exercises     Home Exercises Provided and Patient Education Provided     Education provided:   - pt educated on all interventions performed at today's visit    Written Home Exercises Provided: Patient instructed to cont prior HEP. Exercises were reviewed and Gina was able to demonstrate them prior to the end of the session.  Gina demonstrated good  understanding of the education provided. See EMR under Patient Instructions for exercises provided during therapy sessions    ASSESSMENT     Gina continues to respond well to current plan with improved tolerance to increased reps/resistance and less pain reported with stair descent. Continue to progress as tolerated.       Gina Is progressing well towards her goals.   Pt prognosis is Good.     Pt will continue to benefit from skilled outpatient physical therapy to address the deficits listed in the problem list box on initial evaluation, provide pt/family education and to maximize pt's level of independence in the home and community environment.     Pt's spiritual, cultural and educational needs considered and pt agreeable to plan of care and goals.     Anticipated barriers to physical therapy: scheduling, NWB precautions, pain    Goals:     Short Term Goals:  4 weeks  (not met, progressing)  1.Report decreased L knee pain  < / =  4/10  to increase tolerance for therapy exercises MET  2. Increase knee flexion ROM by 10-20 degrees passively in order to be able to perform ADLs without difficulty. MET  3.  Increase strength by 1/3 MMT grade in LLE  to increase tolerance for ADL and work activities.  4. Pt to tolerate HEP to improve ROM and independence with ADL's MET  5. Increase knee extension ROM by 5-10 degrees passively to improve gait mechanics when returning to weightbearing MET     Long Term Goals: 8 weeks (not met, progressing)  1.Report decreased L knee pain < / = 2/10  to increase tolerance for ambulation MET  2.Patient goal: Pt to tolerate 10 minute increments of standing without increase in knee pain in order to return to cooking.   3.Increase strength to >/= 4+/5 in LLE  to increase tolerance for ADL and work activities.  4. Pt will report at CJ level (20-40% impaired) on FOTO knee to demonstrate increase in LE function with every day tasks.   5. Pt to demonstrate L knee AROM equal to R in order to improve gait mechanics for regular exercise          PLAN     Continue PT POC    Wandy Lopez, PT

## 2022-08-30 ENCOUNTER — CLINICAL SUPPORT (OUTPATIENT)
Dept: REHABILITATION | Facility: HOSPITAL | Age: 67
End: 2022-08-30
Payer: MEDICARE

## 2022-08-30 DIAGNOSIS — M62.81 MUSCLE WEAKNESS OF LOWER EXTREMITY: ICD-10-CM

## 2022-08-30 DIAGNOSIS — S82.102D CLOSED FRACTURE OF PROXIMAL END OF LEFT TIBIA WITH ROUTINE HEALING, UNSPECIFIED FRACTURE MORPHOLOGY, SUBSEQUENT ENCOUNTER: Primary | ICD-10-CM

## 2022-08-30 DIAGNOSIS — M25.662 DECREASED RANGE OF MOTION (ROM) OF LEFT KNEE: ICD-10-CM

## 2022-08-30 PROCEDURE — 97110 THERAPEUTIC EXERCISES: CPT | Mod: PO,CQ

## 2022-08-30 NOTE — PROGRESS NOTES
[facility]   Physical Therapy Treatment Note     [id number]    Therapy Diagnosis:   Encounter Diagnoses   Name Primary?    Closed fracture of proximal end of left tibia with routine healing, unspecified fracture morphology, subsequent encounter Yes    Decreased range of motion (ROM) of left knee     Muscle weakness of lower extremity      Physician: Martin Cleary NP    Visit Date: 8/30/2022    Physician Orders: PT Eval and Treat   Medical Diagnosis from Referral: Closed fracture of proximal end of left tibia with routine healing, unspecified fracture morphology, subsequent encounter [S82.102D]  Evaluation Date: 5/18/2022  Authorization Period Expiration:12/31/22  Plan of Care Expiration: 8/18/22  Progress Note Due: 9/5/22  Visit # / Visits authorized: 26/40   FOTO: 3/3       PTA Visit #: 1/5     Precautions: Standard and toe touch weight bearing, DVT, osteoporosis  PROCEDURE:Open reduction internal fixation left tibial plateau fracture       Time In: 7:45  Time Out: 8:30  Total Appointment Time (timed & untimed codes): 45 minutes       SUBJECTIVE     Pt reports: she received her new brace and that she only has to wear it when on her feet for prolonged periods of time.   She was compliant with home exercise program.  Response to previous treatment: mild soreness  Functional change: Ongoing.     Pain: 0/10 in ankle, 0/10 in the knee  Location: L leg     OBJECTIVE      Objective Measures updated at progress report unless specified.     FOTO 35% on 8/19/22    Treatment     Gina received the treatments listed below:         therapeutic exercises to develop strength, endurance, ROM and flexibility for 45 minutes including:    Recumbent bike 8'  Level 2  SLR 1# 3x10 L   bridges 3x10  sidelying clamshells 3x10 B GTB  sidelying hip ER x20 B  Prone quad stretch 3x1 min L   LAQ's 3 x 10 L 2#      With brace ON:    Heel raises x 30  Standing hamstring  Called the patient wife who is okay with staying with the appointment that is scheduled 11/06/2018 at 1;40 pm curls x30 B  Standing hip abduction x30 B  Weight shifting M/L 2'  TRX squats x 30   Sit to stands from bench with 10# KB at chest 3x10  Star taps lateral and retro with yellow dowel for balance x15 each B  Step ups at stairs leading with L x 30  Double leg shuttle press 4 blk band x 30   Single leg shuttle press 2 blk chord x 30 B  Step downs from bottom step of stair case with OTB for abduction x30 B       Manual for 0 minutes:    Patellar mobilizations in all directions with patient education to perform at home  PROM into knee extension with tibial ER.            Patient Education and Home Exercises     Home Exercises Provided and Patient Education Provided     Education provided:   - pt educated on all interventions performed at today's visit    Written Home Exercises Provided: Patient instructed to cont prior HEP. Exercises were reviewed and Gina was able to demonstrate them prior to the end of the session.  Gina demonstrated good  understanding of the education provided. See EMR under Patient Instructions for exercises provided during therapy sessions    ASSESSMENT     Pt with no pain post treatment session.  Pt continues to respond well to her current therapeutic exercise regimen as she has shown gradual improvements in her L LE strength and stability.  Will continue to monitor and progress pt within her tolerance.       Gina Is progressing well towards her goals.   Pt prognosis is Good.     Pt will continue to benefit from skilled outpatient physical therapy to address the deficits listed in the problem list box on initial evaluation, provide pt/family education and to maximize pt's level of independence in the home and community environment.     Pt's spiritual, cultural and educational needs considered and pt agreeable to plan of care and goals.     Anticipated barriers to physical therapy: scheduling, NWB precautions, pain    Goals:     Short Term Goals:  4 weeks  (not met, progressing)  1.Report  decreased L knee pain  < / =  4/10  to increase tolerance for therapy exercises MET  2. Increase knee flexion ROM by 10-20 degrees passively in order to be able to perform ADLs without difficulty. MET  3. Increase strength by 1/3 MMT grade in LLE  to increase tolerance for ADL and work activities.  4. Pt to tolerate HEP to improve ROM and independence with ADL's MET  5. Increase knee extension ROM by 5-10 degrees passively to improve gait mechanics when returning to weightbearing MET     Long Term Goals: 8 weeks (not met, progressing)  1.Report decreased L knee pain < / = 2/10  to increase tolerance for ambulation MET  2.Patient goal: Pt to tolerate 10 minute increments of standing without increase in knee pain in order to return to cooking.   3.Increase strength to >/= 4+/5 in LLE  to increase tolerance for ADL and work activities.  4. Pt will report at CJ level (20-40% impaired) on FOTO knee to demonstrate increase in LE function with every day tasks.   5. Pt to demonstrate L knee AROM equal to R in order to improve gait mechanics for regular exercise          PLAN     Continue PT POC    Ricardo Carrillo, PTA

## 2022-08-31 ENCOUNTER — PATIENT MESSAGE (OUTPATIENT)
Dept: CARDIOLOGY | Facility: CLINIC | Age: 67
End: 2022-08-31
Payer: MEDICARE

## 2022-09-02 ENCOUNTER — CLINICAL SUPPORT (OUTPATIENT)
Dept: REHABILITATION | Facility: HOSPITAL | Age: 67
End: 2022-09-02
Payer: MEDICARE

## 2022-09-02 DIAGNOSIS — S82.102D CLOSED FRACTURE OF PROXIMAL END OF LEFT TIBIA WITH ROUTINE HEALING, UNSPECIFIED FRACTURE MORPHOLOGY, SUBSEQUENT ENCOUNTER: Primary | ICD-10-CM

## 2022-09-02 DIAGNOSIS — M62.81 MUSCLE WEAKNESS OF LOWER EXTREMITY: ICD-10-CM

## 2022-09-02 DIAGNOSIS — M25.662 DECREASED RANGE OF MOTION (ROM) OF LEFT KNEE: ICD-10-CM

## 2022-09-02 PROCEDURE — 97110 THERAPEUTIC EXERCISES: CPT | Mod: PO

## 2022-09-02 NOTE — PROGRESS NOTES
[facility]   Physical Therapy Treatment Note / Reassessment    Name: Niya Durbin  Clinic Number: [id number]    Therapy Diagnosis:   Encounter Diagnoses   Name Primary?    Closed fracture of proximal end of left tibia with routine healing, unspecified fracture morphology, subsequent encounter Yes    Decreased range of motion (ROM) of left knee     Muscle weakness of lower extremity        Physician: Martin Cleary NP    Visit Date: 9/2/2022    Physician Orders: PT Eval and Treat   Medical Diagnosis from Referral: Closed fracture of proximal end of left tibia with routine healing, unspecified fracture morphology, subsequent encounter [S82.102D]  Evaluation Date: 5/18/2022  Authorization Period Expiration:12/31/22  Plan of Care Expiration: 12/31/22  Progress Note Due: 10/2/22  Visit # / Visits authorized: 27/40   FOTO: 3/3       PTA Visit #: 0/5     Precautions: Standard and toe touch weight bearing, DVT, osteoporosis  PROCEDURE:Open reduction internal fixation left tibial plateau fracture       Time In: 8:15  Time Out: 9:00  Total Appointment Time (timed & untimed codes): 45 minutes       SUBJECTIVE     Pt reports: had some soreness yesterday but felt better after doing exercises   She was compliant with home exercise program.  Response to previous treatment: mild soreness  Functional change: Ongoing.     Pain: 0/10 in ankle, 0/10 in the knee  Location: L leg     OBJECTIVE     Range of Motion (Passive):   Knee Right  Left    Flexion    Extension NT Lacking 2      Range of Motion (Active):   Knee Right  Left    Flexion 133 127   Extension 0 Lacking 4         Lower Extremity Strength  Right LE   Left LE     Quadriceps: 5/5 Quadriceps: 4+/5   Hamstrings: 4+/5 Hamstrings: 4/5   Hip flexion (seated): 4+/5 Hip flexion (seated): NT   Hip extension:  NT Hip extension: 4-/5   Hip Abduction: 4-/5 Hip Abduction:  4/5        FOTO 35% on 8/19/22    Treatment     Gina received the  "treatments listed below:         therapeutic exercises to develop strength, endurance, ROM and flexibility for 45 minutes including:    Recumbent bike 8'  Level 2  SLR 1# 3x10 L   bridges 3x10  sidelying clamshells 3x10 B GTB  sidelying hip ER x20 B  Prone quad stretch 3x1 min L   LAQ's 3 x 10 L 2#      With brace ON:    Heel raises x 30  Standing hamstring curls x30 B  Standing hip abduction x30 B  Weight shifting M/L 2'  TRX squats x 30   Sit to stands from bench with 10# KB at chest 2x10  Star taps lateral and retro with yellow dowel for balance x15 each B  Step ups at stairs leading with L x 30  Double leg shuttle press 4 blk band x 30   Single leg shuttle press 2 blk chord x 30 B  Step ups on 24" box with yellow dowel for balance x10 (up L, down R)      Manual for 0 minutes:    Patellar mobilizations in all directions with patient education to perform at home  PROM into knee extension with tibial ER.            Patient Education and Home Exercises     Home Exercises Provided and Patient Education Provided     Education provided:   - pt educated on all interventions performed at today's visit    Written Home Exercises Provided: Patient instructed to cont prior HEP. Exercises were reviewed and Gina was able to demonstrate them prior to the end of the session.  Gina demonstrated good  understanding of the education provided. See EMR under Patient Instructions for exercises provided during therapy sessions    ASSESSMENT     Upon reassessment today, Gina demonstrates significant improvement in left lower extremity strength and knee range of motion. There is some loss in knee extension range of motion so we discussed self-mobilization into extension at home. She continues to meet PT goals and reports she is doing well with stairs. Noticeable improvement in squat form with less pain reported by patient. She is wearing lateral offloader brace which appears to be donned properly. She has follow-up instruction for " proper used scheduled with orthotist. Continues to progress as tolerated.       Gina Is progressing well towards her goals.   Pt prognosis is Good.     Pt will continue to benefit from skilled outpatient physical therapy to address the deficits listed in the problem list box on initial evaluation, provide pt/family education and to maximize pt's level of independence in the home and community environment.     Pt's spiritual, cultural and educational needs considered and pt agreeable to plan of care and goals.     Anticipated barriers to physical therapy: scheduling, NWB precautions, pain    Goals:     Short Term Goals:  4 weeks  (not met, progressing)  1.Report decreased L knee pain  < / =  4/10  to increase tolerance for therapy exercises MET  2. Increase knee flexion ROM by 10-20 degrees passively in order to be able to perform ADLs without difficulty. MET  3. Increase strength by 1/3 MMT grade in LLE  to increase tolerance for ADL and work activities. MET  4. Pt to tolerate HEP to improve ROM and independence with ADL's MET  5. Increase knee extension ROM by 5-10 degrees passively to improve gait mechanics when returning to weightbearing MET     Long Term Goals: 8 weeks (not met, progressing)  1.Report decreased L knee pain < / = 2/10  to increase tolerance for ambulation MET  2.Patient goal: Pt to tolerate 10 minute increments of standing without increase in knee pain in order to return to cooking.   3.Increase strength to >/= 4+/5 in LLE  to increase tolerance for ADL and work activities.  4. Pt will report at CJ level (20-40% impaired) on FOTO knee to demonstrate increase in LE function with every day tasks.   5. Pt to demonstrate L knee AROM equal to R in order to improve gait mechanics for regular exercise          PLAN     Continue PT POC    Wandy Lopez, PT

## 2022-09-06 ENCOUNTER — CLINICAL SUPPORT (OUTPATIENT)
Dept: REHABILITATION | Facility: HOSPITAL | Age: 67
End: 2022-09-06
Payer: MEDICARE

## 2022-09-06 DIAGNOSIS — M62.81 MUSCLE WEAKNESS OF LOWER EXTREMITY: ICD-10-CM

## 2022-09-06 DIAGNOSIS — M25.662 DECREASED RANGE OF MOTION (ROM) OF LEFT KNEE: ICD-10-CM

## 2022-09-06 DIAGNOSIS — S82.102D CLOSED FRACTURE OF PROXIMAL END OF LEFT TIBIA WITH ROUTINE HEALING, UNSPECIFIED FRACTURE MORPHOLOGY, SUBSEQUENT ENCOUNTER: Primary | ICD-10-CM

## 2022-09-06 PROCEDURE — 97110 THERAPEUTIC EXERCISES: CPT | Mod: PO,CQ

## 2022-09-06 NOTE — PROGRESS NOTES
[facility]   Physical Therapy Treatment Note / Reassessment    Name: Niya Durbin  Clinic Number: [id number]    Therapy Diagnosis:   Encounter Diagnoses   Name Primary?    Closed fracture of proximal end of left tibia with routine healing, unspecified fracture morphology, subsequent encounter Yes    Decreased range of motion (ROM) of left knee     Muscle weakness of lower extremity        Physician: Martin Cleary NP    Visit Date: 9/6/2022    Physician Orders: PT Eval and Treat   Medical Diagnosis from Referral: Closed fracture of proximal end of left tibia with routine healing, unspecified fracture morphology, subsequent encounter [S82.102D]  Evaluation Date: 5/18/2022  Authorization Period Expiration:12/31/22  Plan of Care Expiration: 12/31/22  Progress Note Due: 10/2/22  Visit # / Visits authorized: 28/40   FOTO: 3/3       PTA Visit #: 1/5     Precautions: Standard and toe touch weight bearing, DVT, osteoporosis  PROCEDURE:Open reduction internal fixation left tibial plateau fracture       Time In: 7:45  Time Out: 8:  Total Appointment Time (timed & untimed codes): 45 minutes       SUBJECTIVE     Pt reports: she mainly just has L knee soreness from time to time.   She was compliant with home exercise program.  Response to previous treatment: mild soreness  Functional change: Ongoing.     Pain: 0/10 in ankle, 0/10 in the knee  Location: L leg     OBJECTIVE       Treatment     Gina received the treatments listed below:         therapeutic exercises to develop strength, endurance, ROM and flexibility for 45 minutes including:    Recumbent bike 8'  Level 2  SLR 1# 3x10 L   bridges 3x10  sidelying clamshells 3x10 B GTB  sidelying hip ER x20 B  Prone quad stretch 3x1 min L   LAQ's 3 x 10 L 2#      With brace ON:    Heel raises x 30  Standing hamstring curls x30 B  Standing hip abduction x30 B  Weight shifting M/L 2'  TRX squats x 30   Sit to stands from bench with 10# KB at  "chest 3 x 10  Star taps lateral and retro with yellow dowel for balance x15 each B  Step ups at stairs leading with L x 30  Double leg shuttle press 4 blk band x 30   Single leg shuttle press 2 blk chord x 30 B  Step ups on 24" box with yellow dowel for balance x 15 (up L, down R)  Step downs at stairs x 30     Manual for 0 minutes:    Patellar mobilizations in all directions with patient education to perform at home  PROM into knee extension with tibial ER.            Patient Education and Home Exercises     Home Exercises Provided and Patient Education Provided     Education provided:   - pt educated on all interventions performed at today's visit    Written Home Exercises Provided: Patient instructed to cont prior HEP. Exercises were reviewed and Gina was able to demonstrate them prior to the end of the session.  Gina demonstrated good  understanding of the education provided. See EMR under Patient Instructions for exercises provided during therapy sessions    ASSESSMENT     Pt with no pain post treatment session.  PT was able to maintain appropriate knee alignment during eccentric step downs.  Will continue to monitor and progress pt within her tolerance.       Gina Is progressing well towards her goals.   Pt prognosis is Good.     Pt will continue to benefit from skilled outpatient physical therapy to address the deficits listed in the problem list box on initial evaluation, provide pt/family education and to maximize pt's level of independence in the home and community environment.     Pt's spiritual, cultural and educational needs considered and pt agreeable to plan of care and goals.     Anticipated barriers to physical therapy: scheduling, NWB precautions, pain    Goals:     Short Term Goals:  4 weeks  (not met, progressing)  1.Report decreased L knee pain  < / =  4/10  to increase tolerance for therapy exercises MET  2. Increase knee flexion ROM by 10-20 degrees passively in order to be able to perform " ADLs without difficulty. MET  3. Increase strength by 1/3 MMT grade in LLE  to increase tolerance for ADL and work activities. MET  4. Pt to tolerate HEP to improve ROM and independence with ADL's MET  5. Increase knee extension ROM by 5-10 degrees passively to improve gait mechanics when returning to weightbearing MET     Long Term Goals: 8 weeks (not met, progressing)  1.Report decreased L knee pain < / = 2/10  to increase tolerance for ambulation MET  2.Patient goal: Pt to tolerate 10 minute increments of standing without increase in knee pain in order to return to cooking.   3.Increase strength to >/= 4+/5 in LLE  to increase tolerance for ADL and work activities.  4. Pt will report at CJ level (20-40% impaired) on FOTO knee to demonstrate increase in LE function with every day tasks.   5. Pt to demonstrate L knee AROM equal to R in order to improve gait mechanics for regular exercise          PLAN     Continue PT POC    Ricardo Carrillo, PTA

## 2022-09-09 ENCOUNTER — CLINICAL SUPPORT (OUTPATIENT)
Dept: REHABILITATION | Facility: HOSPITAL | Age: 67
End: 2022-09-09
Payer: MEDICARE

## 2022-09-09 DIAGNOSIS — M25.662 DECREASED RANGE OF MOTION (ROM) OF LEFT KNEE: ICD-10-CM

## 2022-09-09 DIAGNOSIS — S82.102D CLOSED FRACTURE OF PROXIMAL END OF LEFT TIBIA WITH ROUTINE HEALING, UNSPECIFIED FRACTURE MORPHOLOGY, SUBSEQUENT ENCOUNTER: Primary | ICD-10-CM

## 2022-09-09 DIAGNOSIS — M62.81 MUSCLE WEAKNESS OF LOWER EXTREMITY: ICD-10-CM

## 2022-09-09 PROCEDURE — 97110 THERAPEUTIC EXERCISES: CPT | Mod: PO

## 2022-09-09 NOTE — PROGRESS NOTES
[facility]   Physical Therapy Treatment Note / Reassessment    Name: Niya Durbin  Clinic Number: [id number]    Therapy Diagnosis:   Encounter Diagnoses   Name Primary?    Closed fracture of proximal end of left tibia with routine healing, unspecified fracture morphology, subsequent encounter Yes    Decreased range of motion (ROM) of left knee     Muscle weakness of lower extremity          Physician: Martin Cleary NP    Visit Date: 9/9/2022    Physician Orders: PT Eval and Treat   Medical Diagnosis from Referral: Closed fracture of proximal end of left tibia with routine healing, unspecified fracture morphology, subsequent encounter [S82.102D]  Evaluation Date: 5/18/2022  Authorization Period Expiration:12/31/22  Plan of Care Expiration: 12/31/22  Progress Note Due: 10/2/22  Visit # / Visits authorized: 29/40   FOTO: 3/3       PTA Visit #: 0/5     Precautions: Standard and toe touch weight bearing, DVT, osteoporosis  PROCEDURE:Open reduction internal fixation left tibial plateau fracture       Time In: 8:15  Time Out: 9:00  Total Appointment Time (timed & untimed codes): 45 minutes       SUBJECTIVE     Pt reports: intermittent shooting pain in the L knee when walking - usually during SLS phase of gait.   She was compliant with home exercise program.  Response to previous treatment: mild soreness  Functional change: Ongoing.     Pain: 0/10 in ankle, 0/10 in the knee  Location: L leg     OBJECTIVE     Objective measures taken at progress report unless specified otherwise.     Treatment     Gina received the treatments listed below:         therapeutic exercises to develop strength, endurance, ROM and flexibility for 45 minutes including:    Recumbent bike 5'  Level 3  SLR 1# 3x10 L   bridges 3x10  sidelying clamshells 3x10 B GTB  sidelying hip ER x20 B  Prone quad stretch 3x1 min L   LAQ's 3 x 10 L 2#      With brace ON:    Heel raises x 30  Standing hamstring curls x30  "B  Standing hip abduction x30 B  Weight shifting M/L 2'  TRX squats x 30   Star taps lateral and retro with yellow dowel for balance x15 each B  Step ups at stairs leading with L x 30  Double leg shuttle press 4 blk band x 30   Single leg shuttle press 2 blk chord x 30 B  Step downs at stairs x 30     EMOM: 3 rounds, 3 minutes (9 minutes total)  Sit to stands from bench with 10# KB at chest 1x10  1 lap around clinic with 10# KB switching hands each round  Step ups on 24" box with yellow dowel for balance x 8 (up L, down R)    Finish with treadmill walking at self-selected speed (0.8-2.2 mph today) for 5 minutes    Manual for 0 minutes:    Patellar mobilizations in all directions with patient education to perform at home  PROM into knee extension with tibial ER.            Patient Education and Home Exercises     Home Exercises Provided and Patient Education Provided     Education provided:   - pt educated on all interventions performed at today's visit    Written Home Exercises Provided: Patient instructed to cont prior HEP. Exercises were reviewed and Gina was able to demonstrate them prior to the end of the session.  Gina demonstrated good  understanding of the education provided. See EMR under Patient Instructions for exercises provided during therapy sessions    ASSESSMENT     Ms. Fuentes was able to tolerate EMOM very well with no extra rest breaks included. There is no reported pain with exercises today. Finished session for 5 mins of treadmill walking at self-selected speed as patient [place] Continue to progress walking distance and intensity along with LOWER EXTREMITY strength as tolerated.       Gina Is progressing well towards her goals.   Pt prognosis is Good.     Pt will continue to benefit from skilled outpatient physical therapy to address the deficits listed in the problem list box on initial evaluation, provide pt/family education and to maximize pt's level of " independence in the home and community environment.     Pt's spiritual, cultural and educational needs considered and pt agreeable to plan of care and goals.     Anticipated barriers to physical therapy: scheduling, NWB precautions, pain    Goals:     Short Term Goals:  4 weeks  (not met, progressing)  1.Report decreased L knee pain  < / =  4/10  to increase tolerance for therapy exercises MET  2. Increase knee flexion ROM by 10-20 degrees passively in order to be able to perform ADLs without difficulty. MET  3. Increase strength by 1/3 MMT grade in LLE  to increase tolerance for ADL and work activities. MET  4. Pt to tolerate HEP to improve ROM and independence with ADL's MET  5. Increase knee extension ROM by 5-10 degrees passively to improve gait mechanics when returning to weightbearing MET     Long Term Goals: 8 weeks (not met, progressing)  1.Report decreased L knee pain < / = 2/10  to increase tolerance for ambulation MET  2.Patient goal: Pt to tolerate 10 minute increments of standing without increase in knee pain in order to return to cooking.   3.Increase strength to >/= 4+/5 in LLE  to increase tolerance for ADL and work activities.  4. Pt will report at CJ level (20-40% impaired) on FOTO knee to demonstrate increase in LE function with every day tasks.   5. Pt to demonstrate L knee AROM equal to R in order to improve gait mechanics for regular exercise          PLAN     Continue PT POC    Wandy Lopez, PT

## 2022-09-13 ENCOUNTER — CLINICAL SUPPORT (OUTPATIENT)
Dept: REHABILITATION | Facility: HOSPITAL | Age: 67
End: 2022-09-13
Payer: MEDICARE

## 2022-09-13 ENCOUNTER — PATIENT MESSAGE (OUTPATIENT)
Dept: CARDIOLOGY | Facility: CLINIC | Age: 67
End: 2022-09-13
Payer: MEDICARE

## 2022-09-13 DIAGNOSIS — M25.662 DECREASED RANGE OF MOTION (ROM) OF LEFT KNEE: ICD-10-CM

## 2022-09-13 DIAGNOSIS — S82.102D CLOSED FRACTURE OF PROXIMAL END OF LEFT TIBIA WITH ROUTINE HEALING, UNSPECIFIED FRACTURE MORPHOLOGY, SUBSEQUENT ENCOUNTER: Primary | ICD-10-CM

## 2022-09-13 DIAGNOSIS — M62.81 MUSCLE WEAKNESS OF LOWER EXTREMITY: ICD-10-CM

## 2022-09-13 PROCEDURE — 97110 THERAPEUTIC EXERCISES: CPT | Mod: PO,CQ

## 2022-09-13 NOTE — PROGRESS NOTES
[facility]   Physical Therapy Treatment Note / Reassessment    Name: Niya Durbin  Clinic Number: [id number]    Therapy Diagnosis:   Encounter Diagnoses   Name Primary?    Closed fracture of proximal end of left tibia with routine healing, unspecified fracture morphology, subsequent encounter Yes    Decreased range of motion (ROM) of left knee     Muscle weakness of lower extremity          Physician: Martin Cleary NP    Visit Date: 9/13/2022    Physician Orders: PT Eval and Treat   Medical Diagnosis from Referral: Closed fracture of proximal end of left tibia with routine healing, unspecified fracture morphology, subsequent encounter [S82.102D]  Evaluation Date: 5/18/2022  Authorization Period Expiration:12/31/22  Plan of Care Expiration: 12/31/22  Progress Note Due: 10/2/22  Visit # / Visits authorized: 30/40   FOTO: 3/3       PTA Visit #: 1/5      Precautions: Standard and toe touch weight bearing, DVT, osteoporosis  PROCEDURE:Open reduction internal fixation left tibial plateau fracture       Time In: 7:45  Time Out: 8:30  Total Appointment Time (timed & untimed codes): 45 minutes       SUBJECTIVE     Pt reports: no issues or concerns at this time.   She was compliant with home exercise program.  Response to previous treatment: mild soreness  Functional change: Ongoing.     Pain: 0/10 in ankle, 0/10 in the knee  Location: L leg     OBJECTIVE     Objective measures taken at progress report unless specified otherwise.     Treatment     Gina received the treatments listed below:         therapeutic exercises to develop strength, endurance, ROM and flexibility for 45 minutes including:    Recumbent bike 8'  Level 3  SLR 1# 3x10 L   bridges 3x10  sidelying clamshells 3x10 B GTB  sidelying hip ER x20 B  Prone quad stretch 3'   LAQ's 3 x 10 L 2#      With brace ON:    Heel raises x 30  Standing hamstring curls x30 B  Standing hip abduction x30 B  Weight shifting M/L 2'  TRX  "squats x 30   Star taps lateral and retro with yellow dowel for balance x15 each B  Step ups at stairs leading with L x 30  Double leg shuttle press 4 blk band x 30   Single leg shuttle press 2 blk chord x 30 B  Step downs at stairs x 30     EMOM: 3 rounds, 3 minutes (9 minutes total)  Sit to stands from bench with 10# KB at chest 1'  1 lap around clinic with 10# KB switching hands each round 1'  Step ups on 24" box with yellow dowel for balance x 1'  (up L, down R)     Finish with treadmill walking at self-selected speed (0.8-2.2 mph today) for 5 minutes    Manual for 0 minutes:    Patellar mobilizations in all directions with patient education to perform at home  PROM into knee extension with tibial ER.            Patient Education and Home Exercises     Home Exercises Provided and Patient Education Provided     Education provided:   - pt educated on all interventions performed at today's visit    Written Home Exercises Provided: Patient instructed to cont prior HEP. Exercises were reviewed and Gina was able to demonstrate them prior to the end of the session.  Gina demonstrated good  understanding of the education provided. See EMR under Patient Instructions for exercises provided during therapy sessions    ASSESSMENT     Pt with good response to treatment session as she noted no increases in pain post treatment.  Pt was able to maintain appropriate exercise form throughout treatment session.  Will continue to monitor and progress pt within her tolerance.       Gina Is progressing well towards her goals.   Pt prognosis is Good.     Pt will continue to benefit from skilled outpatient physical therapy to address the deficits listed in the problem list box on initial evaluation, provide pt/family education and to maximize pt's level of independence in the home and community environment.     Pt's spiritual, cultural and educational needs considered and pt agreeable to plan of care and goals.     Anticipated " barriers to physical therapy: scheduling, NWB precautions, pain    Goals:     Short Term Goals:  4 weeks  (not met, progressing)  1.Report decreased L knee pain  < / =  4/10  to increase tolerance for therapy exercises MET  2. Increase knee flexion ROM by 10-20 degrees passively in order to be able to perform ADLs without difficulty. MET  3. Increase strength by 1/3 MMT grade in LLE  to increase tolerance for ADL and work activities. MET  4. Pt to tolerate HEP to improve ROM and independence with ADL's MET  5. Increase knee extension ROM by 5-10 degrees passively to improve gait mechanics when returning to weightbearing MET     Long Term Goals: 8 weeks (not met, progressing)  1.Report decreased L knee pain < / = 2/10  to increase tolerance for ambulation MET  2.Patient goal: Pt to tolerate 10 minute increments of standing without increase in knee pain in order to return to cooking.   3.Increase strength to >/= 4+/5 in LLE  to increase tolerance for ADL and work activities.  4. Pt will report at CJ level (20-40% impaired) on FOTO knee to demonstrate increase in LE function with every day tasks.   5. Pt to demonstrate L knee AROM equal to R in order to improve gait mechanics for regular exercise          PLAN     Continue PT POC    Ricardo Carrillo, PTA

## 2022-09-15 ENCOUNTER — PATIENT MESSAGE (OUTPATIENT)
Dept: CARDIOLOGY | Facility: CLINIC | Age: 67
End: 2022-09-15
Payer: MEDICARE

## 2022-09-15 NOTE — PROGRESS NOTES
[facility]   Physical Therapy Treatment Note / Reassessment    Name: Niya Durbin  Clinic Number: [id number]    Therapy Diagnosis:   Encounter Diagnoses   Name Primary?    Closed fracture of proximal end of left tibia with routine healing, unspecified fracture morphology, subsequent encounter Yes    Decreased range of motion (ROM) of left knee     Muscle weakness of lower extremity            Physician: Martin Cleary NP    Visit Date: 9/16/2022    Physician Orders: PT Eval and Treat   Medical Diagnosis from Referral: Closed fracture of proximal end of left tibia with routine healing, unspecified fracture morphology, subsequent encounter [S82.102D]  Evaluation Date: 5/18/2022  Authorization Period Expiration:12/31/22  Plan of Care Expiration: 12/31/22  Progress Note Due: 10/2/22  Visit # / Visits authorized: 31/40   FOTO: 3/3       PTA Visit #: 0/5      Precautions: Standard and toe touch weight bearing, DVT, osteoporosis  PROCEDURE:Open reduction internal fixation left tibial plateau fracture       Time In: 8:15  Time Out: 8:57  Total Appointment Time (timed & untimed codes): 42 minutes       SUBJECTIVE     Pt reports: feeling good today   She was compliant with home exercise program.  Response to previous treatment: mild soreness  Functional change: Ongoing.     Pain: 0/10 in ankle, 0/10 in the knee  Location: L leg     OBJECTIVE     Objective measures taken at progress report unless specified otherwise.     Treatment     Gina received the treatments listed below:     therapeutic exercises to develop strength, endurance, ROM and flexibility for 42 minutes including:    Treadmill walking at self-selected speed (1.5-2.2 mph today) for 5 minutes  Recumbent bike 8'  Level 3  SLR 1# 3x10 L   bridges 3x10  sidelying clamshells 3x10 B GTB  sidelying hip ER x20 B  Prone quad stretch 3'   LAQ's 3 x 10 L 2#      With brace ON:    Heel raises x 30  Standing hamstring curls x30  "B  Standing hip abduction x30 B  Weight shifting M/L 2'  TRX squats x 30   Star taps lateral and retro x15 each B  +trx modified reverse lunges 2x5 B  Step ups at stairs leading with L x 30  Double leg shuttle press 4 blk band x 30   Single leg shuttle press 2 blk chord x 15 B  Step downs at stairs x 30     EMOM: 4 rounds, 3 minutes (12 minutes total)  Sit to stands from bench with 10# KB at chest x10  1 lap around clinic with 10# KB switching hands each round  Step ups on 12" box with yellow dowel for balance x8 (up L, down R)       Manual for 0 minutes:    Patellar mobilizations in all directions with patient education to perform at home  PROM into knee extension with tibial ER.            Patient Education and Home Exercises     Home Exercises Provided and Patient Education Provided     Education provided:   - pt educated on all interventions performed at today's visit  -pt inquiring about doing pushups on knees - I instructed her that she is safe to try this but to discontinue if pain is present. She may modify with pillow under knees. Also instructed to keep legs flat on ground vs having feet elevated.     Written Home Exercises Provided: Patient instructed to cont prior HEP. Exercises were reviewed and Gina was able to demonstrate them prior to the end of the session.  Gina demonstrated good  understanding of the education provided. See EMR under Patient Instructions for exercises provided during therapy sessions    ASSESSMENT     Pt with good response to treatment session as she noted no increases in pain post treatment.  Pt was able to maintain appropriate exercise form throughout treatment session.  Will continue to monitor and progress pt within her tolerance.       Gina Is progressing well towards her goals.   Pt prognosis is Good.     Pt will continue to benefit from skilled outpatient physical therapy to address the deficits listed in the problem list box on initial evaluation, provide pt/family " education and to maximize pt's level of independence in the home and community environment.     Pt's spiritual, cultural and educational needs considered and pt agreeable to plan of care and goals.     Anticipated barriers to physical therapy: scheduling, NWB precautions, pain    Goals:     Short Term Goals:  4 weeks  (not met, progressing)  1.Report decreased L knee pain  < / =  4/10  to increase tolerance for therapy exercises MET  2. Increase knee flexion ROM by 10-20 degrees passively in order to be able to perform ADLs without difficulty. MET  3. Increase strength by 1/3 MMT grade in LLE  to increase tolerance for ADL and work activities. MET  4. Pt to tolerate HEP to improve ROM and independence with ADL's MET  5. Increase knee extension ROM by 5-10 degrees passively to improve gait mechanics when returning to weightbearing MET     Long Term Goals: 8 weeks (not met, progressing)  1.Report decreased L knee pain < / = 2/10  to increase tolerance for ambulation MET  2.Patient goal: Pt to tolerate 10 minute increments of standing without increase in knee pain in order to return to cooking.   3.Increase strength to >/= 4+/5 in LLE  to increase tolerance for ADL and work activities.  4. Pt will report at CJ level (20-40% impaired) on FOTO knee to demonstrate increase in LE function with every day tasks.   5. Pt to demonstrate L knee AROM equal to R in order to improve gait mechanics for regular exercise          PLAN     Continue PT POC    Wandy Lopez, PT

## 2022-09-16 ENCOUNTER — CLINICAL SUPPORT (OUTPATIENT)
Dept: REHABILITATION | Facility: HOSPITAL | Age: 67
End: 2022-09-16
Attending: FAMILY MEDICINE
Payer: MEDICARE

## 2022-09-16 DIAGNOSIS — M62.81 MUSCLE WEAKNESS OF LOWER EXTREMITY: ICD-10-CM

## 2022-09-16 DIAGNOSIS — S82.102D CLOSED FRACTURE OF PROXIMAL END OF LEFT TIBIA WITH ROUTINE HEALING, UNSPECIFIED FRACTURE MORPHOLOGY, SUBSEQUENT ENCOUNTER: Primary | ICD-10-CM

## 2022-09-16 DIAGNOSIS — M25.662 DECREASED RANGE OF MOTION (ROM) OF LEFT KNEE: ICD-10-CM

## 2022-09-16 PROCEDURE — 97110 THERAPEUTIC EXERCISES: CPT | Mod: PO

## 2022-09-20 ENCOUNTER — CLINICAL SUPPORT (OUTPATIENT)
Dept: REHABILITATION | Facility: HOSPITAL | Age: 67
End: 2022-09-20
Attending: FAMILY MEDICINE
Payer: MEDICARE

## 2022-09-20 DIAGNOSIS — M25.662 DECREASED RANGE OF MOTION (ROM) OF LEFT KNEE: ICD-10-CM

## 2022-09-20 DIAGNOSIS — S82.162D CLOSED TORUS FRACTURE OF PROXIMAL END OF LEFT TIBIA WITH ROUTINE HEALING, SUBSEQUENT ENCOUNTER: Primary | ICD-10-CM

## 2022-09-20 DIAGNOSIS — M62.81 MUSCLE WEAKNESS OF LOWER EXTREMITY: ICD-10-CM

## 2022-09-20 PROCEDURE — 97110 THERAPEUTIC EXERCISES: CPT | Mod: PO

## 2022-09-20 NOTE — PROGRESS NOTES
[facility]   Physical Therapy Treatment Note / Reassessment    Name: Niya Durbin  Clinic Number: [id number]    Therapy Diagnosis:   Encounter Diagnoses   Name Primary?    Closed torus fracture of proximal end of left tibia with routine healing, subsequent encounter Yes    Decreased range of motion (ROM) of left knee     Muscle weakness of lower extremity            Physician: Martin Cleary NP    Visit Date: 9/20/2022    Physician Orders: PT Eval and Treat   Medical Diagnosis from Referral: Closed fracture of proximal end of left tibia with routine healing, unspecified fracture morphology, subsequent encounter [S82.102D]  Evaluation Date: 5/18/2022  Authorization Period Expiration:12/31/22  Plan of Care Expiration: 12/31/22  Progress Note Due: 10/2/22  Visit # / Visits authorized: 31/40   FOTO: 3/3       PTA Visit #: 0/5      Precautions: Standard and toe touch weight bearing, DVT, osteoporosis  PROCEDURE:Open reduction internal fixation left tibial plateau fracture       Time In: 8:15  Time Out: 8:57  Total Appointment Time (timed & untimed codes): 42 minutes       SUBJECTIVE     Pt reports: she is feeling stronger and can tell she is able to do more at home.    She was compliant with home exercise program.  Response to previous treatment: mild soreness  Functional change: Ongoing.     Pain: 0/10 in ankle, 0/10 in the knee  Location: L leg     OBJECTIVE     Objective measures taken at progress report unless specified otherwise.     Treatment     Gina received the treatments listed below:     therapeutic exercises to develop strength, endurance, ROM and flexibility for 45 minutes including:     With brace ON:  Treadmill walking at self-selected speed (1.5-2.2 mph today) for 5 minutes  +resisted side stepping OTB x2L of mirror  Star taps lateral and retro 2x8 each B  TRX squats x 15 (advance to reverse lunges instead)  trx modified reverse lunges x8 B  Step ups at stairs  "leading with L x 5L  Double leg shuttle press 4 blk band x 30   NP-Single leg shuttle press 2 blk chord x 15 B  Step downs at stairs x 30      NP:  Treadmill walking at self-selected speed (1.5-2.2 mph today) for 5 minutes    EMOM: 4 rounds, 3 minutes (12 minutes total)  Sit to stands from bench with 10# KB at chest x10  1 lap around clinic with 10# KB switching hands each round  Step ups on 12" box with yellow dowel for balance x8 (up L, down R)       Manual for 0 minutes:    Patellar mobilizations in all directions with patient education to perform at home  PROM into knee extension with tibial ER.            Patient Education and Home Exercises     Home Exercises Provided and Patient Education Provided     Education provided:   - pt educated on all interventions performed at today's visit  -pt inquiring about doing pushups on knees - I instructed her that she is safe to try this but to discontinue if pain is present. She may modify with pillow under knees. Also instructed to keep legs flat on ground vs having feet elevated.     Written Home Exercises Provided: Patient instructed to cont prior HEP. Exercises were reviewed and Gina was able to demonstrate them prior to the end of the session.  Gina demonstrated good  understanding of the education provided. See EMR under Patient Instructions for exercises provided during therapy sessions    ASSESSMEN   Pt tolerated progressions in strengthening and proprioceptive training well today. No pain reported throughout session.       Gina Is progressing well towards her goals.   Pt prognosis is Good.     Pt will continue to benefit from skilled outpatient physical therapy to address the deficits listed in the problem list box on initial evaluation, provide pt/family education and to maximize pt's level of independence in the home and community environment.     Pt's spiritual, cultural and educational needs considered and pt agreeable to plan of care and goals.   "   Anticipated barriers to physical therapy: scheduling, NWB precautions, pain    Goals:     Short Term Goals:  4 weeks  (not met, progressing)  1.Report decreased L knee pain  < / =  4/10  to increase tolerance for therapy exercises MET  2. Increase knee flexion ROM by 10-20 degrees passively in order to be able to perform ADLs without difficulty. MET  3. Increase strength by 1/3 MMT grade in LLE  to increase tolerance for ADL and work activities. MET  4. Pt to tolerate HEP to improve ROM and independence with ADL's MET  5. Increase knee extension ROM by 5-10 degrees passively to improve gait mechanics when returning to weightbearing MET     Long Term Goals: 8 weeks (not met, progressing)  1.Report decreased L knee pain < / = 2/10  to increase tolerance for ambulation MET  2.Patient goal: Pt to tolerate 10 minute increments of standing without increase in knee pain in order to return to cooking.   3.Increase strength to >/= 4+/5 in LLE  to increase tolerance for ADL and work activities.  4. Pt will report at CJ level (20-40% impaired) on FOTO knee to demonstrate increase in LE function with every day tasks.   5. Pt to demonstrate L knee AROM equal to R in order to improve gait mechanics for regular exercise          PLAN     Continue PT POC    Aisha Chacon, PT

## 2022-09-23 ENCOUNTER — CLINICAL SUPPORT (OUTPATIENT)
Dept: REHABILITATION | Facility: HOSPITAL | Age: 67
End: 2022-09-23
Attending: NURSE PRACTITIONER
Payer: MEDICARE

## 2022-09-23 DIAGNOSIS — S82.102D CLOSED FRACTURE OF PROXIMAL END OF LEFT TIBIA WITH ROUTINE HEALING, UNSPECIFIED FRACTURE MORPHOLOGY, SUBSEQUENT ENCOUNTER: Primary | ICD-10-CM

## 2022-09-23 DIAGNOSIS — M25.662 DECREASED RANGE OF MOTION (ROM) OF LEFT KNEE: ICD-10-CM

## 2022-09-23 DIAGNOSIS — M62.81 MUSCLE WEAKNESS OF LOWER EXTREMITY: ICD-10-CM

## 2022-09-23 PROCEDURE — 97110 THERAPEUTIC EXERCISES: CPT | Mod: PO,CQ

## 2022-09-23 NOTE — PROGRESS NOTES
[facility]   Physical Therapy Treatment Note  Name: Niya Soloriowald  Clinic Number: [id number]    Therapy Diagnosis:   No diagnosis found.      Physician: Martin Cleary NP    Visit Date: 9/23/2022    Physician Orders: PT Eval and Treat   Medical Diagnosis from Referral: Closed fracture of proximal end of left tibia with routine healing, unspecified fracture morphology, subsequent encounter [S82.102D]  Evaluation Date: 5/18/2022  Authorization Period Expiration:12/31/22  Plan of Care Expiration: 12/31/22  Progress Note Due: 10/2/22  Visit # / Visits authorized: 33/40   FOTO: 3/3       PTA Visit #: 1/5      Precautions: Standard and toe touch weight bearing, DVT, osteoporosis  PROCEDURE:Open reduction internal fixation left tibial plateau fracture       Time In: 8:15 am  Time Out: 9:00 am  Total Appointment Time (timed & untimed codes): 45 minutes       SUBJECTIVE     Pt reports: no new complaints. She feels good today.  She was compliant with home exercise program.  Response to previous treatment: mild soreness  Functional change: Ongoing.     Pain: 0/10 in ankle, 1/10 in the knee  Location: L leg     OBJECTIVE     Objective measures taken at progress report unless specified otherwise.     Treatment     Gina received the treatments listed below:     therapeutic exercises to develop strength, endurance, ROM and flexibility for 45 minutes including:     With brace ON:  Treadmill walking at self-selected speed (1.5-2.2 mph today) for 5 minutes  Resisted side stepping OTB x2L of mirror  Star taps lateral and retro 2x8 each B  TRX squats x 15 (advance to reverse lunges instead)  TRX modified reverse lunges x8 B  Step ups at stairs leading with L x 5L  Double leg shuttle press 4 blk band x 30  Single leg shuttle press 2 blk chord x 15 B  Step downs at stairs x 30    EMOM: 4 rounds, 3 minutes (12 minutes total)  Sit to stands from bench with 10# KB at chest x10  1 lap around clinic  "with 10# KB switching hands each round  Step ups on 12" box with yellow dowel for balance x8 (up L, down R)      Manual for 0 minutes:    Patellar mobilizations in all directions with patient education to perform at home  PROM into knee extension with tibial ER.       Patient Education and Home Exercises     Home Exercises Provided and Patient Education Provided     Education provided:   - pt educated on all interventions performed at today's visit  -pt inquiring about doing pushups on knees - I instructed her that she is safe to try this but to discontinue if pain is present. She may modify with pillow under knees. Also instructed to keep legs flat on ground vs having feet elevated.     Written Home Exercises Provided: Patient instructed to cont prior HEP. Exercises were reviewed and Gina was able to demonstrate them prior to the end of the session.  Gina demonstrated good  understanding of the education provided. See EMR under Patient Instructions for exercises provided during therapy sessions    ASSESSMEN     Pt presents to tx with no reports of pain and states her knee is feeling good. She tolerated all exercises well with no issues and good carryover with proper form and muscle engagement. No pain reported throughout session. Pt states today is her last visit. However, PT is not present for DC. Will attempt to schedule an appointment with PT for official DC.      Gina Is progressing well towards her goals.  Pt prognosis is Good.    Pt will continue to benefit from skilled outpatient physical therapy to address the deficits listed in the problem list box on initial evaluation, provide pt/family education and to maximize pt's level of independence in the home and community environment.     Pt's spiritual, cultural and educational needs considered and pt agreeable to plan of care and goals.     Anticipated barriers to physical therapy: scheduling, NWB precautions, pain    Goals:     Short Term Goals:  4 weeks  " (not met, progressing)  1.Report decreased L knee pain  < / =  4/10  to increase tolerance for therapy exercises MET  2. Increase knee flexion ROM by 10-20 degrees passively in order to be able to perform ADLs without difficulty. MET  3. Increase strength by 1/3 MMT grade in LLE  to increase tolerance for ADL and work activities. MET  4. Pt to tolerate HEP to improve ROM and independence with ADL's MET  5. Increase knee extension ROM by 5-10 degrees passively to improve gait mechanics when returning to weightbearing MET     Long Term Goals: 8 weeks (not met, progressing)  1.Report decreased L knee pain < / = 2/10  to increase tolerance for ambulation MET  2.Patient goal: Pt to tolerate 10 minute increments of standing without increase in knee pain in order to return to cooking.   3.Increase strength to >/= 4+/5 in LLE  to increase tolerance for ADL and work activities.  4. Pt will report at CJ level (20-40% impaired) on FOTO knee to demonstrate increase in LE function with every day tasks.   5. Pt to demonstrate L knee AROM equal to R in order to improve gait mechanics for regular exercise          PLAN     Continue PT POC    Katharina Ralph, PTA

## 2022-10-21 ENCOUNTER — INFUSION (OUTPATIENT)
Dept: INFECTIOUS DISEASES | Facility: HOSPITAL | Age: 67
End: 2022-10-21
Attending: INTERNAL MEDICINE
Payer: MEDICARE

## 2022-10-21 VITALS
HEART RATE: 63 BPM | WEIGHT: 126.31 LBS | SYSTOLIC BLOOD PRESSURE: 142 MMHG | BODY MASS INDEX: 21.68 KG/M2 | TEMPERATURE: 98 F | DIASTOLIC BLOOD PRESSURE: 67 MMHG | OXYGEN SATURATION: 98 % | RESPIRATION RATE: 18 BRPM

## 2022-10-21 DIAGNOSIS — M81.0 OSTEOPOROSIS, UNSPECIFIED OSTEOPOROSIS TYPE, UNSPECIFIED PATHOLOGICAL FRACTURE PRESENCE: Primary | ICD-10-CM

## 2022-10-21 PROCEDURE — 96372 THER/PROPH/DIAG INJ SC/IM: CPT

## 2022-10-21 PROCEDURE — 63600175 PHARM REV CODE 636 W HCPCS: Mod: JG | Performed by: PHYSICIAN ASSISTANT

## 2022-10-21 RX ADMIN — DENOSUMAB 60 MG: 60 INJECTION SUBCUTANEOUS at 01:10

## 2022-10-21 NOTE — PROGRESS NOTES
Patient received Prolia 60 mg injection sub Q in right arm.  Denies dental surgery < 3 months, Tolerated well and left in NAD    Patient is taking Vit D

## 2022-10-24 ENCOUNTER — HOSPITAL ENCOUNTER (OUTPATIENT)
Dept: CARDIOLOGY | Facility: HOSPITAL | Age: 67
Discharge: HOME | End: 2022-10-24
Attending: INTERNAL MEDICINE
Payer: MEDICARE

## 2022-10-24 DIAGNOSIS — I82.452 ACUTE DEEP VEIN THROMBOSIS (DVT) OF LEFT PERONEAL VEIN: ICD-10-CM

## 2022-10-24 PROCEDURE — 93971 EXTREMITY STUDY: CPT | Mod: LT

## 2022-10-24 PROCEDURE — 93971 CV US DOPPLER VENOUS LEG LEFT (CUPID ONLY): ICD-10-PCS | Mod: 26,LT,, | Performed by: INTERNAL MEDICINE

## 2022-10-24 PROCEDURE — 93971 EXTREMITY STUDY: CPT | Mod: 26,LT,, | Performed by: INTERNAL MEDICINE

## 2022-10-25 ENCOUNTER — PATIENT MESSAGE (OUTPATIENT)
Dept: ORTHOPEDICS | Facility: CLINIC | Age: 67
End: 2022-10-25

## 2022-10-25 ENCOUNTER — HOSPITAL ENCOUNTER (OUTPATIENT)
Dept: RADIOLOGY | Facility: HOSPITAL | Age: 67
Discharge: HOME | End: 2022-10-25
Attending: ORTHOPAEDIC SURGERY
Payer: MEDICARE

## 2022-10-25 DIAGNOSIS — S82.102D CLOSED FRACTURE OF PROXIMAL END OF LEFT TIBIA WITH ROUTINE HEALING, UNSPECIFIED FRACTURE MORPHOLOGY, SUBSEQUENT ENCOUNTER: ICD-10-CM

## 2022-10-25 PROCEDURE — 73560 X-RAY EXAM OF KNEE 1 OR 2: CPT | Mod: TC,LT

## 2022-10-25 PROCEDURE — 73560 X-RAY EXAM OF KNEE 1 OR 2: CPT | Mod: 26,LT,, | Performed by: RADIOLOGY

## 2022-10-25 PROCEDURE — 73560 XR KNEE 1 OR 2 VIEW LEFT: ICD-10-PCS | Mod: 26,LT,, | Performed by: RADIOLOGY

## 2022-10-27 ENCOUNTER — OFFICE VISIT (OUTPATIENT)
Dept: CARDIOLOGY | Facility: CLINIC | Age: 67
End: 2022-10-27
Payer: MEDICARE

## 2022-10-27 VITALS
WEIGHT: 125.25 LBS | RESPIRATION RATE: 20 BRPM | SYSTOLIC BLOOD PRESSURE: 137 MMHG | BODY MASS INDEX: 21.38 KG/M2 | DIASTOLIC BLOOD PRESSURE: 90 MMHG | HEIGHT: 64 IN | HEART RATE: 52 BPM

## 2022-10-27 DIAGNOSIS — I82.552 CHRONIC DEEP VEIN THROMBOSIS (DVT) OF LEFT PERONEAL VEIN: Primary | ICD-10-CM

## 2022-10-27 DIAGNOSIS — R03.0 ELEVATED BP WITHOUT DIAGNOSIS OF HYPERTENSION: ICD-10-CM

## 2022-10-27 PROCEDURE — 99999 PR PBB SHADOW E&M-EST. PATIENT-LVL III: ICD-10-PCS | Mod: PBBFAC,,, | Performed by: INTERNAL MEDICINE

## 2022-10-27 PROCEDURE — 99214 PR OFFICE/OUTPT VISIT, EST, LEVL IV, 30-39 MIN: ICD-10-PCS | Mod: S$GLB,,, | Performed by: INTERNAL MEDICINE

## 2022-10-27 PROCEDURE — 1160F RVW MEDS BY RX/DR IN RCRD: CPT | Mod: CPTII,S$GLB,, | Performed by: INTERNAL MEDICINE

## 2022-10-27 PROCEDURE — 3080F PR MOST RECENT DIASTOLIC BLOOD PRESSURE >= 90 MM HG: ICD-10-PCS | Mod: CPTII,S$GLB,, | Performed by: INTERNAL MEDICINE

## 2022-10-27 PROCEDURE — 1159F PR MEDICATION LIST DOCUMENTED IN MEDICAL RECORD: ICD-10-PCS | Mod: CPTII,S$GLB,, | Performed by: INTERNAL MEDICINE

## 2022-10-27 PROCEDURE — 99214 OFFICE O/P EST MOD 30 MIN: CPT | Mod: S$GLB,,, | Performed by: INTERNAL MEDICINE

## 2022-10-27 PROCEDURE — 99999 PR PBB SHADOW E&M-EST. PATIENT-LVL III: CPT | Mod: PBBFAC,,, | Performed by: INTERNAL MEDICINE

## 2022-10-27 PROCEDURE — 1126F PR PAIN SEVERITY QUANTIFIED, NO PAIN PRESENT: ICD-10-PCS | Mod: CPTII,S$GLB,, | Performed by: INTERNAL MEDICINE

## 2022-10-27 PROCEDURE — 1101F PR PT FALLS ASSESS DOC 0-1 FALLS W/OUT INJ PAST YR: ICD-10-PCS | Mod: CPTII,S$GLB,, | Performed by: INTERNAL MEDICINE

## 2022-10-27 PROCEDURE — 3288F FALL RISK ASSESSMENT DOCD: CPT | Mod: CPTII,S$GLB,, | Performed by: INTERNAL MEDICINE

## 2022-10-27 PROCEDURE — 1160F PR REVIEW ALL MEDS BY PRESCRIBER/CLIN PHARMACIST DOCUMENTED: ICD-10-PCS | Mod: CPTII,S$GLB,, | Performed by: INTERNAL MEDICINE

## 2022-10-27 PROCEDURE — 1159F MED LIST DOCD IN RCRD: CPT | Mod: CPTII,S$GLB,, | Performed by: INTERNAL MEDICINE

## 2022-10-27 PROCEDURE — 3075F SYST BP GE 130 - 139MM HG: CPT | Mod: CPTII,S$GLB,, | Performed by: INTERNAL MEDICINE

## 2022-10-27 PROCEDURE — 3080F DIAST BP >= 90 MM HG: CPT | Mod: CPTII,S$GLB,, | Performed by: INTERNAL MEDICINE

## 2022-10-27 PROCEDURE — 1101F PT FALLS ASSESS-DOCD LE1/YR: CPT | Mod: CPTII,S$GLB,, | Performed by: INTERNAL MEDICINE

## 2022-10-27 PROCEDURE — 3075F PR MOST RECENT SYSTOLIC BLOOD PRESS GE 130-139MM HG: ICD-10-PCS | Mod: CPTII,S$GLB,, | Performed by: INTERNAL MEDICINE

## 2022-10-27 PROCEDURE — 1126F AMNT PAIN NOTED NONE PRSNT: CPT | Mod: CPTII,S$GLB,, | Performed by: INTERNAL MEDICINE

## 2022-10-27 PROCEDURE — 3288F PR FALLS RISK ASSESSMENT DOCUMENTED: ICD-10-PCS | Mod: CPTII,S$GLB,, | Performed by: INTERNAL MEDICINE

## 2022-10-27 NOTE — PROGRESS NOTES
HISTORY:    67-year-old female with a history of left lower extremity DVT after a left lower extremity tibial fracture and osteoporosis presenting for initial evaluation by me.  The patient has previously been seen by Dr. Law.      Patient has completed 5 months of Eliquis anticoagulation. No bleeding. Follow-up ultrasound shows no evidence of left lower extremity DVT. Initial event occurred post ORIF. No previous h/o VTE. No residual PTS or edema.    The patient denies any symptoms of chest pain, shortness of breath, or dyspnea on exertion.    The patient denies any previous history of myocardial infarction, coronary artery disease, peripheral arterial disease, stroke, congestive heart failure, or cardiomyopathy.      PHYSICAL EXAM:    Vitals:    10/27/22 0933   BP: (!) 137/90   Pulse: (!) 52   Resp: 20       NAD, A+Ox3.  No jvd, no bruit.  RRR nml s1,s2. No murmurs.  CTA B no wheezes or crackles.  2+ B radial and 1+ B DP/PT. No edema.    LABS/STUDIES (imaging reviewed during clinic visit):    EKG April 2022 demonstrates sinus rhythm with no Q-waves or ST changes.    Venous duplex October 2022 no evidence of left lower extremity DVT.  May 2022 nonocclusive DVT the left posterior tibial and peroneal veins.  CTA chest April 2022 no evidence of PE.    ASSESSMENT & PLAN:    1. Chronic deep vein thrombosis (DVT) of left peroneal vein    2. Elevated BP without diagnosis of hypertension              Patient had a provoked infrapopliteal DVT status post 5 months of anticoagulation.  Okay to stop anticoagulation at this time. Baby asa moving forward.    Bps appear elevated here. Encouraged patient to restart a home BP log. Recommend reinstituting an exercise regimen. Eats healthy.       Stephanie Chen MD

## 2023-01-18 ENCOUNTER — PATIENT MESSAGE (OUTPATIENT)
Dept: ADMINISTRATIVE | Facility: HOSPITAL | Age: 68
End: 2023-01-18
Payer: MEDICARE

## 2023-02-02 ENCOUNTER — PES CALL (OUTPATIENT)
Dept: ADMINISTRATIVE | Facility: CLINIC | Age: 68
End: 2023-02-02
Payer: MEDICARE

## 2023-02-07 DIAGNOSIS — Z00.00 ENCOUNTER FOR MEDICARE ANNUAL WELLNESS EXAM: ICD-10-CM

## 2023-02-09 DIAGNOSIS — Z00.00 ENCOUNTER FOR MEDICARE ANNUAL WELLNESS EXAM: ICD-10-CM

## 2023-03-17 ENCOUNTER — PATIENT MESSAGE (OUTPATIENT)
Dept: RESEARCH | Facility: HOSPITAL | Age: 68
End: 2023-03-17
Payer: MEDICARE

## 2023-03-28 DIAGNOSIS — M81.0 OSTEOPOROSIS, UNSPECIFIED OSTEOPOROSIS TYPE, UNSPECIFIED PATHOLOGICAL FRACTURE PRESENCE: Primary | ICD-10-CM

## 2023-04-24 ENCOUNTER — INFUSION (OUTPATIENT)
Dept: INFECTIOUS DISEASES | Facility: HOSPITAL | Age: 68
End: 2023-04-24
Attending: FAMILY MEDICINE
Payer: MEDICARE

## 2023-04-24 VITALS
BODY MASS INDEX: 22.18 KG/M2 | DIASTOLIC BLOOD PRESSURE: 77 MMHG | OXYGEN SATURATION: 99 % | WEIGHT: 129.19 LBS | SYSTOLIC BLOOD PRESSURE: 153 MMHG | TEMPERATURE: 98 F | HEART RATE: 54 BPM

## 2023-04-24 DIAGNOSIS — M81.0 OSTEOPOROSIS, UNSPECIFIED OSTEOPOROSIS TYPE, UNSPECIFIED PATHOLOGICAL FRACTURE PRESENCE: Primary | ICD-10-CM

## 2023-04-24 PROCEDURE — 96372 THER/PROPH/DIAG INJ SC/IM: CPT | Mod: HCNC

## 2023-04-24 PROCEDURE — 63600175 PHARM REV CODE 636 W HCPCS: Mod: JZ,JG,HCNC | Performed by: PHYSICIAN ASSISTANT

## 2023-04-24 RX ADMIN — DENOSUMAB 60 MG: 60 INJECTION SUBCUTANEOUS at 01:04

## 2023-04-24 NOTE — PROGRESS NOTES
Patient received Prolia 60 mg injection sub Q in left arm.  Denies dental surgery < 3 months, Tolerated well and left in NAD    Patient is taking Vit D

## 2023-05-23 ENCOUNTER — PATIENT MESSAGE (OUTPATIENT)
Dept: RESEARCH | Facility: HOSPITAL | Age: 68
End: 2023-05-23
Payer: MEDICARE

## 2023-07-21 ENCOUNTER — PATIENT MESSAGE (OUTPATIENT)
Dept: DERMATOLOGY | Facility: CLINIC | Age: 68
End: 2023-07-21
Payer: MEDICARE

## 2023-07-25 ENCOUNTER — TELEPHONE (OUTPATIENT)
Dept: DERMATOLOGY | Facility: CLINIC | Age: 68
End: 2023-07-25
Payer: MEDICARE

## 2023-07-25 NOTE — TELEPHONE ENCOUNTER
----- Message from Joslyn Damon sent at 7/25/2023  2:26 PM CDT -----  Regarding: Appt  Contact: [phone]  Pt is calling to schedule an appt with the provider would like an in person appt and would like to speak with the nurse for scheduling . Please call

## 2023-08-11 ENCOUNTER — OFFICE VISIT (OUTPATIENT)
Dept: DERMATOLOGY | Facility: CLINIC | Age: 68
End: 2023-08-11
Payer: MEDICARE

## 2023-08-11 DIAGNOSIS — L24.0 IRRITANT CONTACT DERMATITIS DUE TO DETERGENT: ICD-10-CM

## 2023-08-11 DIAGNOSIS — L24.5 IRRITANT CONTACT DERMATITIS DUE TO OTHER CHEMICAL PRODUCTS: Primary | ICD-10-CM

## 2023-08-11 PROCEDURE — 1160F RVW MEDS BY RX/DR IN RCRD: CPT | Mod: CPTII,S$GLB,, | Performed by: DERMATOLOGY

## 2023-08-11 PROCEDURE — 99214 OFFICE O/P EST MOD 30 MIN: CPT | Mod: S$GLB,,, | Performed by: DERMATOLOGY

## 2023-08-11 PROCEDURE — 3288F FALL RISK ASSESSMENT DOCD: CPT | Mod: CPTII,S$GLB,, | Performed by: DERMATOLOGY

## 2023-08-11 PROCEDURE — 1159F MED LIST DOCD IN RCRD: CPT | Mod: CPTII,S$GLB,, | Performed by: DERMATOLOGY

## 2023-08-11 PROCEDURE — 1160F PR REVIEW ALL MEDS BY PRESCRIBER/CLIN PHARMACIST DOCUMENTED: ICD-10-PCS | Mod: CPTII,S$GLB,, | Performed by: DERMATOLOGY

## 2023-08-11 PROCEDURE — 1126F PR PAIN SEVERITY QUANTIFIED, NO PAIN PRESENT: ICD-10-PCS | Mod: CPTII,S$GLB,, | Performed by: DERMATOLOGY

## 2023-08-11 PROCEDURE — 1101F PR PT FALLS ASSESS DOC 0-1 FALLS W/OUT INJ PAST YR: ICD-10-PCS | Mod: CPTII,S$GLB,, | Performed by: DERMATOLOGY

## 2023-08-11 PROCEDURE — 1159F PR MEDICATION LIST DOCUMENTED IN MEDICAL RECORD: ICD-10-PCS | Mod: CPTII,S$GLB,, | Performed by: DERMATOLOGY

## 2023-08-11 PROCEDURE — 1126F AMNT PAIN NOTED NONE PRSNT: CPT | Mod: CPTII,S$GLB,, | Performed by: DERMATOLOGY

## 2023-08-11 PROCEDURE — 99214 PR OFFICE/OUTPT VISIT, EST, LEVL IV, 30-39 MIN: ICD-10-PCS | Mod: S$GLB,,, | Performed by: DERMATOLOGY

## 2023-08-11 PROCEDURE — 1101F PT FALLS ASSESS-DOCD LE1/YR: CPT | Mod: CPTII,S$GLB,, | Performed by: DERMATOLOGY

## 2023-08-11 PROCEDURE — 3288F PR FALLS RISK ASSESSMENT DOCUMENTED: ICD-10-PCS | Mod: CPTII,S$GLB,, | Performed by: DERMATOLOGY

## 2023-08-11 RX ORDER — TRIAMCINOLONE ACETONIDE 1 MG/G
CREAM TOPICAL
Qty: 80 G | Refills: 3 | Status: SHIPPED | OUTPATIENT
Start: 2023-08-11

## 2023-08-11 RX ORDER — FLUOCINONIDE 0.5 MG/G
OINTMENT TOPICAL
Qty: 60 G | Refills: 3 | Status: SHIPPED | OUTPATIENT
Start: 2023-08-11

## 2023-08-11 RX ORDER — TRIAMCINOLONE ACETONIDE 1 MG/G
CREAM TOPICAL
Qty: 80 G | Refills: 3 | Status: CANCELLED | OUTPATIENT
Start: 2023-08-11

## 2023-08-11 NOTE — PROGRESS NOTES
Patient Information  Name: Niya Durbin  : 1955  MRN: [id number]     Referring Physician:  No ref. provider found   Primary Care Physician:  Elana Cervantes MD   Date of Visit: 2023      Subjective:     History of Present lllness:    Niya Durbin is a 68 y.o. female who presents with a chief complaint of dry cracking skin on both hands.    Location: B hands  Duration: 2 months  Signs/Symptoms: dry cracking and tightness  Relieving factors/Prior treatments: OTC moisturizer and triamcinolone (no relief)    Diagnosis: irritant contact dermatitis   Location: legs  Signs/Symptoms: redness  Symptom course: improving  Current treatment: triamcinolone cream helps--needs refill    Patient was last seen: 2021.  Prior notes by myself reviewed.   Clinical documentation obtained by nursing staff reviewed.    Review of Systems    Objective:   Physical Exam   Constitutional: She appears well-developed and well-nourished. No distress.   Neurological: She is alert and oriented to person, place, and time. She is not disoriented.   Psychiatric: She has a normal mood and affect.   Skin:   Areas Examined (abnormalities noted in diagram):   RUE Inspected  LUE Inspection Performed  RLE Inspected  LLE Inspection Performed           Diagram Legend     Erythematous scaling macule/papule c/w actinic keratosis       Vascular papule c/w angioma      Pigmented verrucoid papule/plaque c/w seborrheic keratosis      Yellow umbilicated papule c/w sebaceous hyperplasia      Irregularly shaped tan macule c/w lentigo     1-2 mm smooth white papules consistent with Milia      Movable subcutaneous cyst with punctum c/w epidermal inclusion cyst      Subcutaneous movable cyst c/w pilar cyst      Firm pink to brown papule c/w dermatofibroma      Pedunculated fleshy papule(s) c/w skin tag(s)      Evenly pigmented macule c/w junctional nevus     Mildly variegated pigmented, slightly irregular-bordered macule c/w mildly  atypical nevus      Flesh colored to evenly pigmented papule c/w intradermal nevus       Pink pearly papule/plaque c/w basal cell carcinoma      Erythematous hyperkeratotic cursted plaque c/w SCC      Surgical scar with no sign of skin cancer recurrence      Open and closed comedones      Inflammatory papules and pustules      Verrucoid papule consistent consistent with wart     Erythematous eczematous patches and plaques     Dystrophic onycholytic nail with subungual debris c/w onychomycosis     Umbilicated papule    Erythematous-base heme-crusted tan verrucoid plaque consistent with inflamed seborrheic keratosis     Erythematous Silvery Scaling Plaque c/w Psoriasis     See annotation    No images are attached to the encounter or orders placed in the encounter.      [] Data reviewed  [] Prior external notes reviewed  [] Independent review of test  [] Management discussed with another provider  [] Independent historian    Assessment / Plan:        Irritant contact dermatitis due to other chemical products  Irritant contact dermatitis due to detergent  - chronic problem with exacerbation/progression  Avoid contact with dish soap and latex gloves.  -     fluocinonide (LIDEX) 0.05 % ointment; Apply to affected areas of hands bid prn rash  Dispense: 60 g; Refill: 3  -     triamcinolone acetonide 0.1% (KENALOG) 0.1 % cream; Apply to affected areas of hands and legs BID prn rash. Do not use on face, underarms, or groin.  Dispense: 80 g; Refill: 3  Side effects from the overuse of topical steroids include thinning of skin, easy tearing/bruising of skin, stretch marks, spider veins, etc. Use the topical steroid no more than 2 days per week if used long-term and/or take breaks from the topical steroid, especially if any of the above side effects are noticed.    Discussed patch testing with patient if condition recurs or persists.  There are 3 possible outcomes:   1. Positive test, but clinically irrelevant   2. Negative  test--pt is not allergic to the most common allergens that are tested   3. Positive test--allergen is avoided, and rash resolves.   Patches will be applied on a Monday, and test will be read on a Wednesday and Friday.  Pt is unable to get the back wet for these days. Pt must not be on more than 20 mg of systemic prednisone at time of patch testing.       Follow up in about 3 months (around 11/11/2023) for follow up, or sooner if symptoms worsening or not improving.      Jessenia Church MD, FAAD  [facility]

## 2023-08-29 ENCOUNTER — PATIENT MESSAGE (OUTPATIENT)
Dept: DERMATOLOGY | Facility: CLINIC | Age: 68
End: 2023-08-29
Payer: MEDICARE

## 2023-10-25 ENCOUNTER — INFUSION (OUTPATIENT)
Dept: INFECTIOUS DISEASES | Facility: HOSPITAL | Age: 68
End: 2023-10-25
Attending: PHYSICIAN ASSISTANT
Payer: MEDICARE

## 2023-10-25 VITALS
SYSTOLIC BLOOD PRESSURE: 139 MMHG | DIASTOLIC BLOOD PRESSURE: 66 MMHG | OXYGEN SATURATION: 98 % | RESPIRATION RATE: 16 BRPM | BODY MASS INDEX: 21.51 KG/M2 | HEIGHT: 64 IN | WEIGHT: 126 LBS | TEMPERATURE: 99 F | HEART RATE: 56 BPM

## 2023-10-25 DIAGNOSIS — M81.0 OSTEOPOROSIS, UNSPECIFIED OSTEOPOROSIS TYPE, UNSPECIFIED PATHOLOGICAL FRACTURE PRESENCE: Primary | ICD-10-CM

## 2023-10-25 PROCEDURE — 96372 THER/PROPH/DIAG INJ SC/IM: CPT | Mod: HCNC

## 2023-10-25 PROCEDURE — 63600175 PHARM REV CODE 636 W HCPCS: Mod: JZ,JG,HCNC | Performed by: PHYSICIAN ASSISTANT

## 2023-10-25 RX ADMIN — DENOSUMAB 60 MG: 60 INJECTION SUBCUTANEOUS at 01:10

## 2024-01-17 ENCOUNTER — PATIENT MESSAGE (OUTPATIENT)
Dept: DERMATOLOGY | Facility: CLINIC | Age: 69
End: 2024-01-17
Payer: MEDICARE

## 2024-02-06 ENCOUNTER — OFFICE VISIT (OUTPATIENT)
Dept: FAMILY MEDICINE | Facility: CLINIC | Age: 69
End: 2024-02-06
Payer: MEDICARE

## 2024-02-06 DIAGNOSIS — R03.0 ELEVATED BP WITHOUT DIAGNOSIS OF HYPERTENSION: ICD-10-CM

## 2024-02-06 DIAGNOSIS — H53.9 CHANGES IN VISION: ICD-10-CM

## 2024-02-06 DIAGNOSIS — Z00.00 ANNUAL PHYSICAL EXAM: Primary | ICD-10-CM

## 2024-02-06 DIAGNOSIS — M81.0 OSTEOPOROSIS, UNSPECIFIED OSTEOPOROSIS TYPE, UNSPECIFIED PATHOLOGICAL FRACTURE PRESENCE: ICD-10-CM

## 2024-02-06 DIAGNOSIS — Z12.31 ENCOUNTER FOR SCREENING MAMMOGRAM FOR MALIGNANT NEOPLASM OF BREAST: ICD-10-CM

## 2024-02-06 DIAGNOSIS — E03.8 SUBCLINICAL HYPOTHYROIDISM: ICD-10-CM

## 2024-02-06 PROCEDURE — 1126F AMNT PAIN NOTED NONE PRSNT: CPT | Mod: HCNC,CPTII,S$GLB, | Performed by: NURSE PRACTITIONER

## 2024-02-06 PROCEDURE — 3074F SYST BP LT 130 MM HG: CPT | Mod: HCNC,CPTII,S$GLB, | Performed by: NURSE PRACTITIONER

## 2024-02-06 PROCEDURE — 1159F MED LIST DOCD IN RCRD: CPT | Mod: HCNC,CPTII,S$GLB, | Performed by: NURSE PRACTITIONER

## 2024-02-06 PROCEDURE — 1160F RVW MEDS BY RX/DR IN RCRD: CPT | Mod: HCNC,CPTII,S$GLB, | Performed by: NURSE PRACTITIONER

## 2024-02-06 PROCEDURE — 3008F BODY MASS INDEX DOCD: CPT | Mod: HCNC,CPTII,S$GLB, | Performed by: NURSE PRACTITIONER

## 2024-02-06 PROCEDURE — 1101F PT FALLS ASSESS-DOCD LE1/YR: CPT | Mod: HCNC,CPTII,S$GLB, | Performed by: NURSE PRACTITIONER

## 2024-02-06 PROCEDURE — 3288F FALL RISK ASSESSMENT DOCD: CPT | Mod: HCNC,CPTII,S$GLB, | Performed by: NURSE PRACTITIONER

## 2024-02-06 PROCEDURE — 99397 PER PM REEVAL EST PAT 65+ YR: CPT | Mod: HCNC,S$GLB,, | Performed by: NURSE PRACTITIONER

## 2024-02-06 PROCEDURE — 3078F DIAST BP <80 MM HG: CPT | Mod: HCNC,CPTII,S$GLB, | Performed by: NURSE PRACTITIONER

## 2024-02-06 PROCEDURE — 99999 PR PBB SHADOW E&M-EST. PATIENT-LVL IV: CPT | Mod: PBBFAC,HCNC,, | Performed by: NURSE PRACTITIONER

## 2024-02-06 NOTE — PROGRESS NOTES
Subjective:       Patient ID: Niya Durbin is a 68 y.o. female.    Chief Complaint: Annual Exam  Niya Durbin presents today for routine annual exam.  Last seen in 10/19/2021 by PCP, ROOSEVELT Cervantes MD. This is her first visit with me.     Ms. Durbin is generally well. Denies abnormal vaginal bleeding, vaginal discharge, itching, or burning. No dysuria or hematuria.   Hx hypothyroidism, osteoporosis, DVT, and pelvic and tibia fracture.    HPI per ROS.    Patient Active Problem List   Diagnosis    Subclinical hypothyroidism    Osteoporosis    History of colon polyps    History of pelvic fracture    Vitamin D insufficiency    Fatigue    Elevated BP without diagnosis of hypertension    Closed fracture of upper end of left tibia with routine healing    Decreased range of motion (ROM) of left knee    Muscle weakness of lower extremity    Deep venous thrombosis (DVT) of left peroneal vein         Current Outpatient Medications:     apixaban (ELIQUIS) 5 mg Tab, Take 1 tablet (5 mg total) by mouth 2 (two) times daily., Disp: 60 tablet, Rfl: 4    ascorbic acid, vitamin C, (VITAMIN C) 100 MG tablet, Take 100 mg by mouth once daily., Disp: , Rfl:     denosumab (PROLIA) 60 mg/mL Syrg, Inject 60 mg into the skin., Disp: , Rfl:     fluocinonide (LIDEX) 0.05 % ointment, Apply to affected areas of hands bid prn rash, Disp: 60 g, Rfl: 3    gabapentin (NEURONTIN) 100 MG capsule, Take 1 capsule (100 mg total) by mouth 3 (three) times daily. (Patient taking differently: Take 100 mg by mouth 2 (two) times daily.), Disp: 90 capsule, Rfl: 11    triamcinolone acetonide 0.1% (KENALOG) 0.1 % cream, Apply to affected areas of hands and legs BID prn rash. Do not use on face, underarms, or groin., Disp: 80 g, Rfl: 3    vitamin D (VITAMIN D3) 1000 units Tab, Take 1,000 Units by mouth once daily. Regular Vit D 1000mg, Disp: , Rfl:   No current facility-administered medications for this visit.    Facility-Administered Medications  Ordered in Other Visits:     0.9%  NaCl infusion, , Intravenous, Continuous, Santo Hoyt MD, Stopped at 09/26/19 1020    sodium chloride 0.9% flush 10 mL, 10 mL, Intravenous, PRN, Santo Hoyt MD    The following portions of the patient's history were reviewed and updated as appropriate: allergies, past family history, past medical history, past social history and past surgical history.    Review of Systems   Constitutional:  Negative for appetite change, fatigue, fever and unexpected weight change.   HENT:  Negative for hearing loss, rhinorrhea, sneezing, sore throat and trouble swallowing.    Eyes:  Positive for visual disturbance.   Respiratory:  Negative for cough, shortness of breath and wheezing.    Cardiovascular:  Negative for chest pain and palpitations.   Gastrointestinal:  Negative for abdominal pain, constipation, diarrhea, nausea and vomiting.   Genitourinary:  Negative for difficulty urinating, dysuria, frequency and hematuria.   Musculoskeletal:  Negative for arthralgias and myalgias.   Neurological:  Negative for dizziness, weakness and numbness.   Psychiatric/Behavioral:  Negative for sleep disturbance. The patient is not nervous/anxious.        Objective:      /69   Pulse (!) 58   Wt 58.1 kg (128 lb)   SpO2 98%   BMI 21.97 kg/m²     Physical Exam  Constitutional:       General: She is not in acute distress.     Appearance: Normal appearance.   HENT:      Head: Normocephalic and atraumatic.      Right Ear: Tympanic membrane normal.      Left Ear: Tympanic membrane normal.      Nose: Nose normal.      Mouth/Throat:      Mouth: Mucous membranes are moist.      Pharynx: Oropharynx is clear.   Eyes:      Extraocular Movements: Extraocular movements intact.      Pupils: Pupils are equal, round, and reactive to light.   Cardiovascular:      Rate and Rhythm: Normal rate and regular rhythm.      Pulses: Normal pulses.      Heart sounds: Normal heart sounds.   Pulmonary:      Effort:  Pulmonary effort is normal.      Breath sounds: Normal breath sounds.   Abdominal:      Palpations: Abdomen is soft.   Musculoskeletal:         General: No swelling or deformity. Normal range of motion.      Cervical back: Normal range of motion and neck supple.   Skin:     General: Skin is warm and dry.      Capillary Refill: Capillary refill takes less than 2 seconds.   Neurological:      General: No focal deficit present.      Mental Status: She is alert and oriented to person, place, and time.      Coordination: Coordination normal.      Gait: Gait normal.   Psychiatric:         Mood and Affect: Mood normal.         Behavior: Behavior normal.         Assessment:       1. Annual physical exam    2. Encounter for screening mammogram for malignant neoplasm of breast    3. Osteoporosis, unspecified osteoporosis type, unspecified pathological fracture presence    4. Subclinical hypothyroidism    5. Elevated BP without diagnosis of hypertension    6. Changes in vision        Plan:   Niya was seen today for annual exam.    Diagnoses and all orders for this visit:    Annual physical exam    Encounter for screening mammogram for malignant neoplasm of breast  -     Mammo Digital Screening Bilat w/ Hi; Future    Osteoporosis, unspecified osteoporosis type, unspecified pathological fracture presence  -     DXA Bone Density Axial Skeleton 1 or more sites; Future    Subclinical hypothyroidism  -     TSH; Future    Elevated BP without diagnosis of hypertension  -     Lipid Panel; Future  -     CBC Auto Differential; Future  -     Comprehensive Metabolic Panel; Future    Changes in vision  -     Ambulatory referral/consult to Optometry; Future

## 2024-02-08 ENCOUNTER — LAB VISIT (OUTPATIENT)
Dept: LAB | Facility: HOSPITAL | Age: 69
End: 2024-02-08
Attending: FAMILY MEDICINE
Payer: MEDICARE

## 2024-02-08 DIAGNOSIS — E03.8 SUBCLINICAL HYPOTHYROIDISM: ICD-10-CM

## 2024-02-08 DIAGNOSIS — R03.0 ELEVATED BP WITHOUT DIAGNOSIS OF HYPERTENSION: ICD-10-CM

## 2024-02-08 DIAGNOSIS — R79.9 ABNORMAL FINDING OF BLOOD CHEMISTRY, UNSPECIFIED: ICD-10-CM

## 2024-02-08 LAB
ALBUMIN SERPL BCP-MCNC: 3.9 G/DL (ref 3.5–5.2)
ALP SERPL-CCNC: 42 U/L (ref 55–135)
ALT SERPL W/O P-5'-P-CCNC: 12 U/L (ref 10–44)
ANION GAP SERPL CALC-SCNC: 7 MMOL/L (ref 8–16)
AST SERPL-CCNC: 16 U/L (ref 10–40)
BASOPHILS # BLD AUTO: 0.03 K/UL (ref 0–0.2)
BASOPHILS NFR BLD: 0.7 % (ref 0–1.9)
BILIRUB SERPL-MCNC: 0.6 MG/DL (ref 0.1–1)
BUN SERPL-MCNC: 14 MG/DL (ref 8–23)
CALCIUM SERPL-MCNC: 9.2 MG/DL (ref 8.7–10.5)
CHLORIDE SERPL-SCNC: 105 MMOL/L (ref 95–110)
CHOLEST SERPL-MCNC: 172 MG/DL (ref 120–199)
CHOLEST/HDLC SERPL: 2.5 {RATIO} (ref 2–5)
CO2 SERPL-SCNC: 27 MMOL/L (ref 23–29)
CREAT SERPL-MCNC: 0.7 MG/DL (ref 0.5–1.4)
DIFFERENTIAL METHOD BLD: NORMAL
EOSINOPHIL # BLD AUTO: 0.3 K/UL (ref 0–0.5)
EOSINOPHIL NFR BLD: 5.8 % (ref 0–8)
ERYTHROCYTE [DISTWIDTH] IN BLOOD BY AUTOMATED COUNT: 12.9 % (ref 11.5–14.5)
EST. GFR  (NO RACE VARIABLE): >60 ML/MIN/1.73 M^2
GLUCOSE SERPL-MCNC: 89 MG/DL (ref 70–110)
HCT VFR BLD AUTO: 39.5 % (ref 37–48.5)
HDLC SERPL-MCNC: 69 MG/DL (ref 40–75)
HDLC SERPL: 40.1 % (ref 20–50)
HGB BLD-MCNC: 13.3 G/DL (ref 12–16)
IMM GRANULOCYTES # BLD AUTO: 0.01 K/UL (ref 0–0.04)
IMM GRANULOCYTES NFR BLD AUTO: 0.2 % (ref 0–0.5)
LDLC SERPL CALC-MCNC: 91.4 MG/DL (ref 63–159)
LYMPHOCYTES # BLD AUTO: 1.2 K/UL (ref 1–4.8)
LYMPHOCYTES NFR BLD: 28.8 % (ref 18–48)
MCH RBC QN AUTO: 30.5 PG (ref 27–31)
MCHC RBC AUTO-ENTMCNC: 33.7 G/DL (ref 32–36)
MCV RBC AUTO: 91 FL (ref 82–98)
MONOCYTES # BLD AUTO: 0.5 K/UL (ref 0.3–1)
MONOCYTES NFR BLD: 10.7 % (ref 4–15)
NEUTROPHILS # BLD AUTO: 2.3 K/UL (ref 1.8–7.7)
NEUTROPHILS NFR BLD: 53.8 % (ref 38–73)
NONHDLC SERPL-MCNC: 103 MG/DL
NRBC BLD-RTO: 0 /100 WBC
PLATELET # BLD AUTO: 231 K/UL (ref 150–450)
PMV BLD AUTO: 10.4 FL (ref 9.2–12.9)
POTASSIUM SERPL-SCNC: 4.5 MMOL/L (ref 3.5–5.1)
PROT SERPL-MCNC: 6.5 G/DL (ref 6–8.4)
RBC # BLD AUTO: 4.36 M/UL (ref 4–5.4)
SODIUM SERPL-SCNC: 139 MMOL/L (ref 136–145)
T4 FREE SERPL-MCNC: 0.88 NG/DL (ref 0.71–1.51)
TRIGL SERPL-MCNC: 58 MG/DL (ref 30–150)
TSH SERPL DL<=0.005 MIU/L-ACNC: 8.35 UIU/ML (ref 0.4–4)
WBC # BLD AUTO: 4.31 K/UL (ref 3.9–12.7)

## 2024-02-08 PROCEDURE — 85025 COMPLETE CBC W/AUTO DIFF WBC: CPT | Mod: HCNC | Performed by: NURSE PRACTITIONER

## 2024-02-08 PROCEDURE — 80053 COMPREHEN METABOLIC PANEL: CPT | Mod: HCNC | Performed by: NURSE PRACTITIONER

## 2024-02-08 PROCEDURE — 84443 ASSAY THYROID STIM HORMONE: CPT | Mod: HCNC | Performed by: NURSE PRACTITIONER

## 2024-02-08 PROCEDURE — 84439 ASSAY OF FREE THYROXINE: CPT | Mod: HCNC | Performed by: NURSE PRACTITIONER

## 2024-02-08 PROCEDURE — 36415 COLL VENOUS BLD VENIPUNCTURE: CPT | Mod: HCNC,PO | Performed by: NURSE PRACTITIONER

## 2024-02-08 PROCEDURE — 80061 LIPID PANEL: CPT | Mod: HCNC | Performed by: NURSE PRACTITIONER

## 2024-02-15 ENCOUNTER — PATIENT MESSAGE (OUTPATIENT)
Dept: FAMILY MEDICINE | Facility: CLINIC | Age: 69
End: 2024-02-15
Payer: MEDICARE

## 2024-02-15 VITALS
DIASTOLIC BLOOD PRESSURE: 69 MMHG | OXYGEN SATURATION: 98 % | WEIGHT: 128 LBS | BODY MASS INDEX: 21.97 KG/M2 | SYSTOLIC BLOOD PRESSURE: 122 MMHG | HEART RATE: 58 BPM

## 2024-02-23 ENCOUNTER — PATIENT MESSAGE (OUTPATIENT)
Dept: FAMILY MEDICINE | Facility: CLINIC | Age: 69
End: 2024-02-23
Payer: MEDICARE

## 2024-03-05 ENCOUNTER — HOSPITAL ENCOUNTER (OUTPATIENT)
Dept: RADIOLOGY | Facility: HOSPITAL | Age: 69
Discharge: HOME | End: 2024-03-05
Attending: NURSE PRACTITIONER
Payer: MEDICARE

## 2024-03-05 DIAGNOSIS — Z12.31 ENCOUNTER FOR SCREENING MAMMOGRAM FOR MALIGNANT NEOPLASM OF BREAST: ICD-10-CM

## 2024-03-05 PROCEDURE — 77067 SCR MAMMO BI INCL CAD: CPT | Mod: TC,HCNC

## 2024-03-05 PROCEDURE — 77067 SCR MAMMO BI INCL CAD: CPT | Mod: 26,HCNC,, | Performed by: RADIOLOGY

## 2024-03-05 PROCEDURE — 77063 BREAST TOMOSYNTHESIS BI: CPT | Mod: 26,HCNC,, | Performed by: RADIOLOGY

## 2024-03-06 ENCOUNTER — PATIENT MESSAGE (OUTPATIENT)
Dept: FAMILY MEDICINE | Facility: CLINIC | Age: 69
End: 2024-03-06
Payer: MEDICARE

## 2024-03-11 ENCOUNTER — HOSPITAL ENCOUNTER (OUTPATIENT)
Dept: RADIOLOGY | Facility: CLINIC | Age: 69
Discharge: HOME | End: 2024-03-11
Attending: NURSE PRACTITIONER
Payer: MEDICARE

## 2024-03-11 DIAGNOSIS — M81.0 OSTEOPOROSIS, UNSPECIFIED OSTEOPOROSIS TYPE, UNSPECIFIED PATHOLOGICAL FRACTURE PRESENCE: ICD-10-CM

## 2024-03-11 PROCEDURE — 77080 DXA BONE DENSITY AXIAL: CPT | Mod: TC,HCNC

## 2024-03-11 PROCEDURE — 77080 DXA BONE DENSITY AXIAL: CPT | Mod: 26,HCNC,, | Performed by: INTERNAL MEDICINE

## 2024-03-25 ENCOUNTER — LAB VISIT (OUTPATIENT)
Dept: LAB | Facility: HOSPITAL | Age: 69
End: 2024-03-25
Attending: NURSE PRACTITIONER
Payer: MEDICARE

## 2024-03-25 ENCOUNTER — OFFICE VISIT (OUTPATIENT)
Dept: FAMILY MEDICINE | Facility: CLINIC | Age: 69
End: 2024-03-25
Payer: MEDICARE

## 2024-03-25 VITALS
WEIGHT: 128.13 LBS | BODY MASS INDEX: 21.99 KG/M2 | HEART RATE: 57 BPM | OXYGEN SATURATION: 98 % | SYSTOLIC BLOOD PRESSURE: 116 MMHG | DIASTOLIC BLOOD PRESSURE: 58 MMHG

## 2024-03-25 DIAGNOSIS — E03.8 SUBCLINICAL HYPOTHYROIDISM: Primary | ICD-10-CM

## 2024-03-25 DIAGNOSIS — E03.8 SUBCLINICAL HYPOTHYROIDISM: ICD-10-CM

## 2024-03-25 LAB
T4 FREE SERPL-MCNC: 0.95 NG/DL (ref 0.71–1.51)
TSH SERPL DL<=0.005 MIU/L-ACNC: 5 UIU/ML (ref 0.4–4)

## 2024-03-25 PROCEDURE — 1101F PT FALLS ASSESS-DOCD LE1/YR: CPT | Mod: HCNC,CPTII,S$GLB, | Performed by: NURSE PRACTITIONER

## 2024-03-25 PROCEDURE — 36415 COLL VENOUS BLD VENIPUNCTURE: CPT | Mod: HCNC,PO | Performed by: NURSE PRACTITIONER

## 2024-03-25 PROCEDURE — 1159F MED LIST DOCD IN RCRD: CPT | Mod: HCNC,CPTII,S$GLB, | Performed by: NURSE PRACTITIONER

## 2024-03-25 PROCEDURE — 3078F DIAST BP <80 MM HG: CPT | Mod: HCNC,CPTII,S$GLB, | Performed by: NURSE PRACTITIONER

## 2024-03-25 PROCEDURE — 1160F RVW MEDS BY RX/DR IN RCRD: CPT | Mod: HCNC,CPTII,S$GLB, | Performed by: NURSE PRACTITIONER

## 2024-03-25 PROCEDURE — 84443 ASSAY THYROID STIM HORMONE: CPT | Mod: HCNC | Performed by: NURSE PRACTITIONER

## 2024-03-25 PROCEDURE — 99999 PR PBB SHADOW E&M-EST. PATIENT-LVL III: CPT | Mod: PBBFAC,HCNC,, | Performed by: NURSE PRACTITIONER

## 2024-03-25 PROCEDURE — 3288F FALL RISK ASSESSMENT DOCD: CPT | Mod: HCNC,CPTII,S$GLB, | Performed by: NURSE PRACTITIONER

## 2024-03-25 PROCEDURE — 84439 ASSAY OF FREE THYROXINE: CPT | Mod: HCNC | Performed by: NURSE PRACTITIONER

## 2024-03-25 PROCEDURE — 3074F SYST BP LT 130 MM HG: CPT | Mod: HCNC,CPTII,S$GLB, | Performed by: NURSE PRACTITIONER

## 2024-03-25 PROCEDURE — 3008F BODY MASS INDEX DOCD: CPT | Mod: HCNC,CPTII,S$GLB, | Performed by: NURSE PRACTITIONER

## 2024-03-25 PROCEDURE — 1126F AMNT PAIN NOTED NONE PRSNT: CPT | Mod: HCNC,CPTII,S$GLB, | Performed by: NURSE PRACTITIONER

## 2024-03-25 PROCEDURE — 99213 OFFICE O/P EST LOW 20 MIN: CPT | Mod: HCNC,S$GLB,, | Performed by: NURSE PRACTITIONER

## 2024-03-25 NOTE — PROGRESS NOTES
Subjective:       Patient ID: Niya Durbin is a 69 y.o. female.    Chief Complaint: Results  Niya Durbin presents today for follow up of thyroid problem. Last seen in 2/6/2024.    Thyroid Problem  Presents for initial visit. Patient reports no anxiety, cold intolerance, constipation, diarrhea, fatigue, hair loss, heat intolerance, palpitations, tremors, weight gain or weight loss. Past treatments include nothing. There are no known risk factors.     Patient Active Problem List   Diagnosis    Subclinical hypothyroidism    Osteoporosis    History of colon polyps    History of pelvic fracture    Vitamin D insufficiency    Fatigue    Elevated BP without diagnosis of hypertension    Closed fracture of upper end of left tibia with routine healing    Decreased range of motion (ROM) of left knee    Muscle weakness of lower extremity    Deep venous thrombosis (DVT) of left peroneal vein       Current Outpatient Medications:     apixaban (ELIQUIS) 5 mg Tab, Take 1 tablet (5 mg total) by mouth 2 (two) times daily., Disp: 60 tablet, Rfl: 4    ascorbic acid, vitamin C, (VITAMIN C) 100 MG tablet, Take 100 mg by mouth once daily., Disp: , Rfl:     denosumab (PROLIA) 60 mg/mL Syrg, Inject 60 mg into the skin., Disp: , Rfl:     fluocinonide (LIDEX) 0.05 % ointment, Apply to affected areas of hands bid prn rash, Disp: 60 g, Rfl: 3    gabapentin (NEURONTIN) 100 MG capsule, Take 1 capsule (100 mg total) by mouth 3 (three) times daily. (Patient taking differently: Take 100 mg by mouth 2 (two) times daily.), Disp: 90 capsule, Rfl: 11    triamcinolone acetonide 0.1% (KENALOG) 0.1 % cream, Apply to affected areas of hands and legs BID prn rash. Do not use on face, underarms, or groin., Disp: 80 g, Rfl: 3    vitamin D (VITAMIN D3) 1000 units Tab, Take 1,000 Units by mouth once daily. Regular Vit D 1000mg, Disp: , Rfl:   No current facility-administered medications for this visit.    Facility-Administered Medications Ordered  in Other Visits:     0.9%  NaCl infusion, , Intravenous, Continuous, Santo Hoyt MD, Stopped at 09/26/19 1020    sodium chloride 0.9% flush 10 mL, 10 mL, Intravenous, PRN, Santo Hoyt MD    The following portions of the patient's history were reviewed and updated as appropriate: allergies, past family history, past medical history, past social history and past surgical history.    Review of Systems   Constitutional:  Negative for appetite change, fatigue, fever, unexpected weight change, weight gain and weight loss.   HENT:  Negative for hearing loss, rhinorrhea, sneezing, sore throat and trouble swallowing.    Eyes:  Negative for visual disturbance.   Respiratory:  Negative for cough, shortness of breath and wheezing.    Cardiovascular:  Negative for chest pain and palpitations.   Gastrointestinal:  Negative for abdominal pain, constipation, diarrhea, nausea and vomiting.   Endocrine: Negative for cold intolerance and heat intolerance.   Genitourinary:  Negative for difficulty urinating, dysuria, frequency and hematuria.   Musculoskeletal:  Negative for arthralgias and myalgias.   Neurological:  Negative for dizziness, tremors, weakness and numbness.   Psychiatric/Behavioral:  Negative for sleep disturbance. The patient is not nervous/anxious.        Objective:      BP (!) 116/58   Pulse (!) 57   Wt 58.1 kg (128 lb 1.6 oz)   SpO2 98%   BMI 21.99 kg/m²     Physical Exam  Constitutional:       General: She is not in acute distress.     Appearance: Normal appearance.   Cardiovascular:      Rate and Rhythm: Normal rate and regular rhythm.      Pulses: Normal pulses.      Heart sounds: Normal heart sounds.   Pulmonary:      Effort: Pulmonary effort is normal.      Breath sounds: Normal breath sounds.   Musculoskeletal:         General: Normal range of motion.   Skin:     General: Skin is warm and dry.   Neurological:      Mental Status: She is alert and oriented to person, place, and time.   Psychiatric:          Mood and Affect: Mood normal.         Behavior: Behavior normal.         Assessment:       1. Subclinical hypothyroidism        Plan:   Niya was seen today for results.    Diagnoses and all orders for this visit:    Subclinical hypothyroidism  -     TSH; Future

## 2024-04-30 ENCOUNTER — INFUSION (OUTPATIENT)
Dept: INFECTIOUS DISEASES | Facility: HOSPITAL | Age: 69
End: 2024-04-30
Payer: MEDICARE

## 2024-04-30 VITALS
RESPIRATION RATE: 16 BRPM | WEIGHT: 129.19 LBS | HEIGHT: 65 IN | BODY MASS INDEX: 21.52 KG/M2 | SYSTOLIC BLOOD PRESSURE: 138 MMHG | OXYGEN SATURATION: 96 % | HEART RATE: 70 BPM | TEMPERATURE: 98 F | DIASTOLIC BLOOD PRESSURE: 67 MMHG

## 2024-04-30 DIAGNOSIS — M81.0 OSTEOPOROSIS, UNSPECIFIED OSTEOPOROSIS TYPE, UNSPECIFIED PATHOLOGICAL FRACTURE PRESENCE: Primary | ICD-10-CM

## 2024-04-30 PROCEDURE — 96372 THER/PROPH/DIAG INJ SC/IM: CPT | Mod: HCNC

## 2024-04-30 PROCEDURE — 63600175 PHARM REV CODE 636 W HCPCS: Mod: JZ,JG,HCNC | Performed by: PHYSICIAN ASSISTANT

## 2024-04-30 RX ADMIN — DENOSUMAB 60 MG: 60 INJECTION SUBCUTANEOUS at 01:04

## 2024-04-30 NOTE — PROGRESS NOTES
Patient arrives for Prolia injection - confirms use of calcium and vitamin D supplements and denies dental procedures over past 3 months - administered per guidelines.    Tolerated well. Next appt scheduled. Limited head-to-toe assessment due to privacy issues and visit reason though the opportunity was given for patient to express any concerns.

## 2024-06-05 ENCOUNTER — OFFICE VISIT (OUTPATIENT)
Dept: OPTOMETRY | Facility: CLINIC | Age: 69
End: 2024-06-05
Payer: MEDICARE

## 2024-06-05 DIAGNOSIS — H52.4 HYPEROPIA OF BOTH EYES WITH ASTIGMATISM AND PRESBYOPIA: ICD-10-CM

## 2024-06-05 DIAGNOSIS — H53.2 DIPLOPIA: ICD-10-CM

## 2024-06-05 DIAGNOSIS — H52.03 HYPEROPIA OF BOTH EYES WITH ASTIGMATISM AND PRESBYOPIA: ICD-10-CM

## 2024-06-05 DIAGNOSIS — H52.203 HYPEROPIA OF BOTH EYES WITH ASTIGMATISM AND PRESBYOPIA: ICD-10-CM

## 2024-06-05 DIAGNOSIS — H25.13 SENILE NUCLEAR CATARACT, BILATERAL: Primary | ICD-10-CM

## 2024-06-05 DIAGNOSIS — H53.9 CHANGES IN VISION: ICD-10-CM

## 2024-06-05 PROCEDURE — 92004 COMPRE OPH EXAM NEW PT 1/>: CPT | Mod: HCNC,S$GLB,, | Performed by: OPTOMETRIST

## 2024-06-05 PROCEDURE — 1101F PT FALLS ASSESS-DOCD LE1/YR: CPT | Mod: HCNC,CPTII,S$GLB, | Performed by: OPTOMETRIST

## 2024-06-05 PROCEDURE — 92015 DETERMINE REFRACTIVE STATE: CPT | Mod: HCNC,S$GLB,, | Performed by: OPTOMETRIST

## 2024-06-05 PROCEDURE — 3288F FALL RISK ASSESSMENT DOCD: CPT | Mod: HCNC,CPTII,S$GLB, | Performed by: OPTOMETRIST

## 2024-06-05 PROCEDURE — 1126F AMNT PAIN NOTED NONE PRSNT: CPT | Mod: HCNC,CPTII,S$GLB, | Performed by: OPTOMETRIST

## 2024-06-05 PROCEDURE — 99999 PR PBB SHADOW E&M-EST. PATIENT-LVL III: CPT | Mod: PBBFAC,HCNC,, | Performed by: OPTOMETRIST

## 2024-06-05 PROCEDURE — 1159F MED LIST DOCD IN RCRD: CPT | Mod: HCNC,CPTII,S$GLB, | Performed by: OPTOMETRIST

## 2024-06-05 RX ORDER — COVID-19 VACCINE, MRNA 0.04 MG/.418ML
INJECTION, SUSPENSION INTRAMUSCULAR
COMMUNITY
Start: 2023-12-21

## 2024-06-05 NOTE — PROGRESS NOTES
HPI    Pt sts she has been seeing double vision for about 3ys now. No flashes no   floaters. One time 3yrs ago she had a flash of light when she was brushing   her teeth but hasn't happened since.   Last edited by Neetu Galan on 6/5/2024  1:33 PM.            Assessment /Plan     For exam results, see Encounter Report.    Senile nuclear cataract, bilateral    Changes in vision  -     Ambulatory referral/consult to Optometry    Hyperopia of both eyes with astigmatism and presbyopia    Diplopia      MONITOR. ED PT ON ALL EXAM FINDINGS  RX FINAL SPECS; OK TO CONTINUE WITH HABITUAL SPECS   PT REPORTS INTERMITTENT DIPLOPIA; NO IDENTIFIABLE TRIGGERS; PRESENT IN BOTH LATERAL AND CENTRAL GAZE; DDX: OCULAR FATIGUE, DRY EYES, CI; REFER FOR CONSULT; UNREMARKABLE HEALTH HISTORY FOR VASCULAR DX'S PER PT; INTERMITTENT AND SHORT EPISODES.   MILD NS OU; UV PROTECTION; PRESURGICAL; MONITOR  RTC 1 YR//PRN FOR REE/DFE

## 2024-06-25 ENCOUNTER — PATIENT MESSAGE (OUTPATIENT)
Dept: OPTOMETRY | Facility: CLINIC | Age: 69
End: 2024-06-25
Payer: MEDICARE

## 2024-07-29 ENCOUNTER — OFFICE VISIT (OUTPATIENT)
Dept: OPHTHALMOLOGY | Facility: CLINIC | Age: 69
End: 2024-07-29
Payer: MEDICARE

## 2024-07-29 DIAGNOSIS — H50.00 INTERMITTENT ESOTROPIA OF BOTH EYES: Primary | ICD-10-CM

## 2024-07-29 PROCEDURE — 1160F RVW MEDS BY RX/DR IN RCRD: CPT | Mod: HCNC,CPTII,S$GLB, | Performed by: STUDENT IN AN ORGANIZED HEALTH CARE EDUCATION/TRAINING PROGRAM

## 2024-07-29 PROCEDURE — 1126F AMNT PAIN NOTED NONE PRSNT: CPT | Mod: HCNC,CPTII,S$GLB, | Performed by: STUDENT IN AN ORGANIZED HEALTH CARE EDUCATION/TRAINING PROGRAM

## 2024-07-29 PROCEDURE — 1101F PT FALLS ASSESS-DOCD LE1/YR: CPT | Mod: HCNC,CPTII,S$GLB, | Performed by: STUDENT IN AN ORGANIZED HEALTH CARE EDUCATION/TRAINING PROGRAM

## 2024-07-29 PROCEDURE — 99999 PR PBB SHADOW E&M-EST. PATIENT-LVL II: CPT | Mod: PBBFAC,HCNC,, | Performed by: STUDENT IN AN ORGANIZED HEALTH CARE EDUCATION/TRAINING PROGRAM

## 2024-07-29 PROCEDURE — 92060 SENSORIMOTOR EXAMINATION: CPT | Mod: HCNC,S$GLB,, | Performed by: STUDENT IN AN ORGANIZED HEALTH CARE EDUCATION/TRAINING PROGRAM

## 2024-07-29 PROCEDURE — 99203 OFFICE O/P NEW LOW 30 MIN: CPT | Mod: HCNC,S$GLB,, | Performed by: STUDENT IN AN ORGANIZED HEALTH CARE EDUCATION/TRAINING PROGRAM

## 2024-07-29 PROCEDURE — 3288F FALL RISK ASSESSMENT DOCD: CPT | Mod: HCNC,CPTII,S$GLB, | Performed by: STUDENT IN AN ORGANIZED HEALTH CARE EDUCATION/TRAINING PROGRAM

## 2024-07-29 PROCEDURE — 1159F MED LIST DOCD IN RCRD: CPT | Mod: HCNC,CPTII,S$GLB, | Performed by: STUDENT IN AN ORGANIZED HEALTH CARE EDUCATION/TRAINING PROGRAM

## 2024-07-30 PROBLEM — H50.00 INTERMITTENT ESOTROPIA OF BOTH EYES: Status: ACTIVE | Noted: 2024-07-30

## 2024-07-30 NOTE — ASSESSMENT & PLAN NOTE
Pt reports 3 years of daily intermittent diplopia, worse at distance    7/29/24: Small angle intermittent ET. Comitant in all gazes and D=N  Of note, does have mild anisometropia on wearing Rx     Plan:  Possible decompensated phoria vs early divergence insufficiency (though no D:N disparity today)  Will treat with prisms  Prescribed Fresnel 4 SERJIO over OD   RTC 4-6 weeks

## 2024-07-30 NOTE — PROGRESS NOTES
HPI    Niya Durbin is a 70 y/o female present today for double vision.   Patient reports intermittent double vision, mainly at a distance noticed   while driving at night or watching TV. Started about 3 years ago and has   gradually increased to point she now sees it daily. She has never worn   prisms. She denies any childhood strabismus.   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  No other complaints at this time          Last edited by Valentin Raymond MD on 7/30/2024  8:35 AM.        ROS    Negative for: Constitutional, Gastrointestinal, Neurological, Skin,   Genitourinary, Musculoskeletal, HENT, Endocrine, Cardiovascular, Eyes,   Respiratory, Psychiatric, Allergic/Imm, Heme/Lymph  Last edited by Valentin Raymond MD on 7/30/2024  8:35 AM.        Assessment /Plan     For exam results, see Encounter Report.    Intermittent esotropia of both eyes        Problem List Items Addressed This Visit          Ophtho    Intermittent esotropia of both eyes - Primary    Current Assessment & Plan     Pt reports 3 years of daily intermittent diplopia, worse at distance    7/29/24: Small angle intermittent ET. Comitant in all gazes and D=N  Of note, does have mild anisometropia on wearing Rx     Plan:  Possible decompensated phoria vs early divergence insufficiency (though no D:N disparity today)  Will treat with prisms  Prescribed Boogiesnel 4 SERJIO over OD   RTC 4-6 weeks           Valentin Raymond MD  Pediatric Ophthalmology and Adult Strabismus  [facility]

## 2024-08-18 ENCOUNTER — PATIENT MESSAGE (OUTPATIENT)
Dept: DERMATOLOGY | Facility: CLINIC | Age: 69
End: 2024-08-18
Payer: MEDICARE

## 2024-09-16 ENCOUNTER — OFFICE VISIT (OUTPATIENT)
Dept: OPHTHALMOLOGY | Facility: CLINIC | Age: 69
End: 2024-09-16
Payer: MEDICARE

## 2024-09-16 DIAGNOSIS — H52.223 REGULAR ASTIGMATISM OF BOTH EYES: ICD-10-CM

## 2024-09-16 DIAGNOSIS — H50.00 INTERMITTENT ESOTROPIA OF BOTH EYES: Primary | ICD-10-CM

## 2024-09-16 PROCEDURE — 92060 SENSORIMOTOR EXAMINATION: CPT | Mod: HCNC,S$GLB,, | Performed by: STUDENT IN AN ORGANIZED HEALTH CARE EDUCATION/TRAINING PROGRAM

## 2024-09-16 PROCEDURE — 1159F MED LIST DOCD IN RCRD: CPT | Mod: HCNC,CPTII,S$GLB, | Performed by: STUDENT IN AN ORGANIZED HEALTH CARE EDUCATION/TRAINING PROGRAM

## 2024-09-16 PROCEDURE — 92015 DETERMINE REFRACTIVE STATE: CPT | Mod: HCNC,S$GLB,, | Performed by: STUDENT IN AN ORGANIZED HEALTH CARE EDUCATION/TRAINING PROGRAM

## 2024-09-16 PROCEDURE — 1160F RVW MEDS BY RX/DR IN RCRD: CPT | Mod: HCNC,CPTII,S$GLB, | Performed by: STUDENT IN AN ORGANIZED HEALTH CARE EDUCATION/TRAINING PROGRAM

## 2024-09-16 PROCEDURE — 1101F PT FALLS ASSESS-DOCD LE1/YR: CPT | Mod: HCNC,CPTII,S$GLB, | Performed by: STUDENT IN AN ORGANIZED HEALTH CARE EDUCATION/TRAINING PROGRAM

## 2024-09-16 PROCEDURE — 3288F FALL RISK ASSESSMENT DOCD: CPT | Mod: HCNC,CPTII,S$GLB, | Performed by: STUDENT IN AN ORGANIZED HEALTH CARE EDUCATION/TRAINING PROGRAM

## 2024-09-16 PROCEDURE — 99999 PR PBB SHADOW E&M-EST. PATIENT-LVL III: CPT | Mod: PBBFAC,HCNC,, | Performed by: STUDENT IN AN ORGANIZED HEALTH CARE EDUCATION/TRAINING PROGRAM

## 2024-09-16 PROCEDURE — 99213 OFFICE O/P EST LOW 20 MIN: CPT | Mod: HCNC,S$GLB,, | Performed by: STUDENT IN AN ORGANIZED HEALTH CARE EDUCATION/TRAINING PROGRAM

## 2024-09-16 PROCEDURE — 1126F AMNT PAIN NOTED NONE PRSNT: CPT | Mod: HCNC,CPTII,S$GLB, | Performed by: STUDENT IN AN ORGANIZED HEALTH CARE EDUCATION/TRAINING PROGRAM

## 2024-09-17 PROBLEM — H52.223 REGULAR ASTIGMATISM OF BOTH EYES: Status: ACTIVE | Noted: 2024-09-17

## 2024-09-17 NOTE — ASSESSMENT & PLAN NOTE
Pt reports 3 years of daily intermittent diplopia, worse at distance    7/29/24: Small angle intermittent ET. Comitant in all gazes and D=N  Of note, does have mild anisometropia on wearing Rx   Plan:  Possible decompensated phoria vs early divergence insufficiency (though no D:N disparity)  Will treat with prisms    9/17/24: Patient doing well with fresnel prism - no diplopia. Alignment exam stable  Plan:  Updated refraction today and gave ground in prism  RTC 1 year or sooner PRN

## 2024-09-17 NOTE — PROGRESS NOTES
HPI    DLS: 07/29/2024  Gina Durbin is a 69 y.o. female who returns for 6 wk E(T) f/u.   Fresnel prism sticker was prescribed over OD lens. She states that double   vision is resolved with prism sticker. Pt denies blurry vision and   headaches.     Eye meds: none  Last edited by Jessica Pennington MA on 9/16/2024  3:14 PM.            Assessment /Plan     For exam results, see Encounter Report.    Intermittent esotropia of both eyes    Regular astigmatism of both eyes        Problem List Items Addressed This Visit          Ophtho    Intermittent esotropia of both eyes - Primary    Current Assessment & Plan     Pt reports 3 years of daily intermittent diplopia, worse at distance    7/29/24: Small angle intermittent ET. Comitant in all gazes and D=N  Of note, does have mild anisometropia on wearing Rx   Plan:  Possible decompensated phoria vs early divergence insufficiency (though no D:N disparity)  Will treat with prisms    9/17/24: Patient doing well with fresnel prism - no diplopia. Alignment exam stable  Plan:  Updated refraction today and gave ground in prism  RTC 1 year or sooner PRN           Regular astigmatism of both eyes      Valentin Raymond MD  Pediatric Ophthalmology and Adult Strabismus  [facility]

## 2024-10-31 ENCOUNTER — INFUSION (OUTPATIENT)
Dept: INFECTIOUS DISEASES | Facility: HOSPITAL | Age: 69
End: 2024-10-31
Payer: MEDICARE

## 2024-10-31 VITALS
BODY MASS INDEX: 21.6 KG/M2 | SYSTOLIC BLOOD PRESSURE: 158 MMHG | WEIGHT: 129.63 LBS | OXYGEN SATURATION: 98 % | HEART RATE: 61 BPM | TEMPERATURE: 99 F | HEIGHT: 65 IN | RESPIRATION RATE: 18 BRPM | DIASTOLIC BLOOD PRESSURE: 69 MMHG

## 2024-10-31 DIAGNOSIS — M81.0 OSTEOPOROSIS, UNSPECIFIED OSTEOPOROSIS TYPE, UNSPECIFIED PATHOLOGICAL FRACTURE PRESENCE: Primary | ICD-10-CM

## 2024-10-31 PROCEDURE — 96372 THER/PROPH/DIAG INJ SC/IM: CPT | Mod: HCNC

## 2024-10-31 PROCEDURE — 63600175 PHARM REV CODE 636 W HCPCS: Mod: JZ,JG,HCNC | Performed by: PHYSICIAN ASSISTANT

## 2024-10-31 RX ADMIN — DENOSUMAB 60 MG: 60 INJECTION SUBCUTANEOUS at 10:10

## 2025-02-24 DIAGNOSIS — Z00.00 ENCOUNTER FOR MEDICARE ANNUAL WELLNESS EXAM: ICD-10-CM

## 2025-04-29 DIAGNOSIS — M81.0 OSTEOPOROSIS, UNSPECIFIED OSTEOPOROSIS TYPE, UNSPECIFIED PATHOLOGICAL FRACTURE PRESENCE: Primary | ICD-10-CM

## 2025-05-02 ENCOUNTER — LAB VISIT (OUTPATIENT)
Dept: LAB | Facility: HOSPITAL | Age: 70
End: 2025-05-02
Payer: MEDICARE

## 2025-05-02 ENCOUNTER — INFUSION (OUTPATIENT)
Dept: INFECTIOUS DISEASES | Facility: HOSPITAL | Age: 70
End: 2025-05-02
Payer: MEDICARE

## 2025-05-02 VITALS
TEMPERATURE: 99 F | RESPIRATION RATE: 18 BRPM | SYSTOLIC BLOOD PRESSURE: 136 MMHG | DIASTOLIC BLOOD PRESSURE: 77 MMHG | WEIGHT: 130.63 LBS | OXYGEN SATURATION: 97 % | HEART RATE: 58 BPM | BODY MASS INDEX: 21.76 KG/M2 | HEIGHT: 65 IN

## 2025-05-02 DIAGNOSIS — M81.0 OSTEOPOROSIS, UNSPECIFIED OSTEOPOROSIS TYPE, UNSPECIFIED PATHOLOGICAL FRACTURE PRESENCE: Primary | ICD-10-CM

## 2025-05-02 DIAGNOSIS — M81.0 OSTEOPOROSIS, UNSPECIFIED OSTEOPOROSIS TYPE, UNSPECIFIED PATHOLOGICAL FRACTURE PRESENCE: ICD-10-CM

## 2025-05-02 LAB — 25(OH)D3+25(OH)D2 SERPL-MCNC: 52 NG/ML (ref 30–96)

## 2025-05-02 PROCEDURE — 82306 VITAMIN D 25 HYDROXY: CPT | Mod: HCNC

## 2025-05-02 PROCEDURE — 96372 THER/PROPH/DIAG INJ SC/IM: CPT | Mod: HCNC

## 2025-05-02 PROCEDURE — 36415 COLL VENOUS BLD VENIPUNCTURE: CPT | Mod: HCNC

## 2025-05-02 PROCEDURE — 63600175 PHARM REV CODE 636 W HCPCS: Mod: JZ,TB,HCNC | Performed by: PHYSICIAN ASSISTANT

## 2025-05-02 RX ADMIN — DENOSUMAB 60 MG: 60 INJECTION SUBCUTANEOUS at 10:05

## 2025-05-02 NOTE — PROGRESS NOTES
Patient arrives for Prolia injection - confirms use of calcium and vitamin D supplements and denies invasive  dental procedures over past 3 months - administered per guidelines.    Limited head to toe assessment completed.     Pt sent to lab after injection to have Vitamin D level drawn per order of Dr. Sanches.

## 2025-07-11 ENCOUNTER — OFFICE VISIT (OUTPATIENT)
Dept: FAMILY MEDICINE | Facility: CLINIC | Age: 70
End: 2025-07-11
Payer: MEDICARE

## 2025-07-11 VITALS
WEIGHT: 131.63 LBS | HEART RATE: 60 BPM | OXYGEN SATURATION: 97 % | BODY MASS INDEX: 22.47 KG/M2 | DIASTOLIC BLOOD PRESSURE: 76 MMHG | SYSTOLIC BLOOD PRESSURE: 122 MMHG | HEIGHT: 64 IN

## 2025-07-11 DIAGNOSIS — I82.552 CHRONIC DEEP VEIN THROMBOSIS (DVT) OF LEFT PERONEAL VEIN: ICD-10-CM

## 2025-07-11 DIAGNOSIS — E55.9 VITAMIN D INSUFFICIENCY: ICD-10-CM

## 2025-07-11 DIAGNOSIS — M81.0 OSTEOPOROSIS, UNSPECIFIED OSTEOPOROSIS TYPE, UNSPECIFIED PATHOLOGICAL FRACTURE PRESENCE: ICD-10-CM

## 2025-07-11 DIAGNOSIS — Z23 ENCOUNTER FOR IMMUNIZATION: ICD-10-CM

## 2025-07-11 DIAGNOSIS — Z12.31 ENCOUNTER FOR SCREENING MAMMOGRAM FOR MALIGNANT NEOPLASM OF BREAST: ICD-10-CM

## 2025-07-11 DIAGNOSIS — E03.8 SUBCLINICAL HYPOTHYROIDISM: ICD-10-CM

## 2025-07-11 DIAGNOSIS — Z00.00 ANNUAL PHYSICAL EXAM: Primary | ICD-10-CM

## 2025-07-11 PROCEDURE — 99999 PR PBB SHADOW E&M-EST. PATIENT-LVL IV: CPT | Mod: PBBFAC,HCNC,, | Performed by: NURSE PRACTITIONER

## 2025-07-11 NOTE — PROGRESS NOTES
"Subjective:       Patient ID: Niya Durbin is a 70 y.o. female.    Chief Complaint: Annual Exam  Niay Durbin presents today for routine annual exam. Last seen in 3/25/2024.     Ms. Durbin is generally well. Denies abnormal vaginal bleeding, vaginal discharge, itching, or burning. No dysuria or hematuria.   Hx subclinical hypothyroidism, osteoporosis, DVT, and pelvic and tibia fracture.    HPI per ROS.     Problem List[1]    Current Medications[2]    The following portions of the patient's history were reviewed and updated as appropriate: allergies, past family history, past medical history, past social history and past surgical history.    Review of Systems   Constitutional:  Negative for appetite change, fatigue, fever and unexpected weight change.   HENT:  Negative for hearing loss, rhinorrhea, sneezing, sore throat and trouble swallowing.    Eyes:  Negative for visual disturbance.   Respiratory:  Negative for cough, shortness of breath and wheezing.    Cardiovascular:  Negative for chest pain and palpitations.   Gastrointestinal:  Negative for abdominal pain, constipation, diarrhea, nausea and vomiting.   Genitourinary:  Negative for difficulty urinating, dysuria, frequency and hematuria.   Musculoskeletal:  Negative for arthralgias and myalgias.   Neurological:  Negative for dizziness, weakness and numbness.   Psychiatric/Behavioral:  Negative for sleep disturbance. The patient is not nervous/anxious.        Objective:      /76 (BP Location: Left arm, Patient Position: Sitting)   Pulse 60   Ht 5' 4" (1.626 m)   Wt 59.7 kg (131 lb 9.6 oz)   SpO2 97%   BMI 22.59 kg/m²     Physical Exam  Constitutional:       General: She is not in acute distress.     Appearance: Normal appearance.   HENT:      Head: Normocephalic and atraumatic.      Right Ear: Tympanic membrane normal.      Left Ear: Tympanic membrane normal.      Nose: Nose normal.      Mouth/Throat:      Mouth: Mucous membranes are " moist.      Pharynx: Oropharynx is clear.   Eyes:      Extraocular Movements: Extraocular movements intact.      Pupils: Pupils are equal, round, and reactive to light.   Cardiovascular:      Rate and Rhythm: Normal rate and regular rhythm.      Pulses: Normal pulses.      Heart sounds: Normal heart sounds.   Pulmonary:      Effort: Pulmonary effort is normal.      Breath sounds: Normal breath sounds.   Abdominal:      Palpations: Abdomen is soft.   Musculoskeletal:         General: No swelling or deformity. Normal range of motion.      Cervical back: Normal range of motion and neck supple.   Skin:     General: Skin is warm and dry.      Capillary Refill: Capillary refill takes less than 2 seconds.   Neurological:      General: No focal deficit present.      Mental Status: She is alert and oriented to person, place, and time.      Coordination: Coordination normal.      Gait: Gait normal.   Psychiatric:         Mood and Affect: Mood normal.         Behavior: Behavior normal.         Assessment:       1. Annual physical exam    2. Encounter for screening mammogram for malignant neoplasm of breast    3. Encounter for immunization    4. Chronic deep vein thrombosis (DVT) of left peroneal vein    5. Vitamin D insufficiency    6. Subclinical hypothyroidism    7. Osteoporosis, unspecified osteoporosis type, unspecified pathological fracture presence        Plan:   1. Annual physical exam  -     TSH; Future; Expected date: 07/11/2025  -     Comprehensive Metabolic Panel; Future; Expected date: 07/11/2025  -     CBC Auto Differential; Future; Expected date: 07/25/2025  -     Lipid Panel; Future; Expected date: 07/11/2025    2. Encounter for screening mammogram for malignant neoplasm of breast  -     Mammo Digital Screening Bilat w/ Hi (XPD); Future; Expected date: 07/11/2025    3. Encounter for immunization  -     pneumoc 20-rai conj-dip cr(PF) (PREVNAR-20 (PF)) injection Syrg 0.5 mL    4. Chronic deep vein thrombosis  (DVT) of left peroneal vein  Assessment & Plan:  -- chronic; stable; eliquis d/c'd per cardiology in 2022  -- continue asa      5. Vitamin D insufficiency  Assessment & Plan:  -- stable; on OTC Vit D      6. Subclinical hypothyroidism  Assessment & Plan:  -- Monitor; repeat TSH today      7. Osteoporosis, unspecified osteoporosis type, unspecified pathological fracture presence  Overview:  Sep 2018 FRAX: 23%/6.5%  On Prolia    Assessment & Plan:  -- stable on prolia  -- follows with endocrinology                 [1]   Patient Active Problem List  Diagnosis    Subclinical hypothyroidism    Osteoporosis    History of colon polyps    History of pelvic fracture    Vitamin D insufficiency    Fatigue    Elevated BP without diagnosis of hypertension    Closed fracture of upper end of left tibia with routine healing    Decreased range of motion (ROM) of left knee    Muscle weakness of lower extremity    Chronic deep vein thrombosis (DVT) of left peroneal vein    Intermittent esotropia of both eyes    Regular astigmatism of both eyes   [2]   Current Outpatient Medications:     ascorbic acid, vitamin C, (VITAMIN C) 100 MG tablet, Take 100 mg by mouth once daily., Disp: , Rfl:     COMIRNATY 2023-24, 12Y UP,,PF, 30 mcg/0.3 mL inection, , Disp: , Rfl:     denosumab (PROLIA) 60 mg/mL Syrg, Inject 60 mg into the skin., Disp: , Rfl:     fluocinonide (LIDEX) 0.05 % ointment, Apply to affected areas of hands bid prn rash, Disp: 60 g, Rfl: 3    triamcinolone acetonide 0.1% (KENALOG) 0.1 % cream, Apply to affected areas of hands and legs BID prn rash. Do not use on face, underarms, or groin., Disp: 80 g, Rfl: 3    vitamin D (VITAMIN D3) 1000 units Tab, Take 1,000 Units by mouth once daily. Regular Vit D 1000mg, Disp: , Rfl:     apixaban (ELIQUIS) 5 mg Tab, Take 1 tablet (5 mg total) by mouth 2 (two) times daily., Disp: 60 tablet, Rfl: 4    gabapentin (NEURONTIN) 100 MG capsule, Take 1 capsule (100 mg total) by mouth 3 (three) times  daily. (Patient taking differently: Take 100 mg by mouth 2 (two) times daily.), Disp: 90 capsule, Rfl: 11  No current facility-administered medications for this visit.    Facility-Administered Medications Ordered in Other Visits:     0.9%  NaCl infusion, , Intravenous, Continuous, Santo Hoyt MD, Stopped at 09/26/19 1020    sodium chloride 0.9% flush 10 mL, 10 mL, Intravenous, PRN, Santo Hoyt MD

## 2025-07-14 ENCOUNTER — LAB VISIT (OUTPATIENT)
Dept: LAB | Facility: HOSPITAL | Age: 70
End: 2025-07-14
Attending: NURSE PRACTITIONER
Payer: MEDICARE

## 2025-07-14 DIAGNOSIS — Z00.00 ANNUAL PHYSICAL EXAM: ICD-10-CM

## 2025-07-14 LAB
ABSOLUTE EOSINOPHIL (OHS): 0.24 K/UL
ABSOLUTE MONOCYTE (OHS): 0.42 K/UL (ref 0.3–1)
ABSOLUTE NEUTROPHIL COUNT (OHS): 2.6 K/UL (ref 1.8–7.7)
ALBUMIN SERPL BCP-MCNC: 4.1 G/DL (ref 3.5–5.2)
ALP SERPL-CCNC: 44 UNIT/L (ref 40–150)
ALT SERPL W/O P-5'-P-CCNC: 13 UNIT/L (ref 10–44)
ANION GAP (OHS): 7 MMOL/L (ref 8–16)
AST SERPL-CCNC: 17 UNIT/L (ref 11–45)
BASOPHILS # BLD AUTO: 0.03 K/UL
BASOPHILS NFR BLD AUTO: 0.7 %
BILIRUB SERPL-MCNC: 0.5 MG/DL (ref 0.1–1)
BUN SERPL-MCNC: 17 MG/DL (ref 8–23)
CALCIUM SERPL-MCNC: 8.6 MG/DL (ref 8.7–10.5)
CHLORIDE SERPL-SCNC: 108 MMOL/L (ref 95–110)
CHOLEST SERPL-MCNC: 180 MG/DL (ref 120–199)
CHOLEST/HDLC SERPL: 2.9 {RATIO} (ref 2–5)
CO2 SERPL-SCNC: 27 MMOL/L (ref 23–29)
CREAT SERPL-MCNC: 0.7 MG/DL (ref 0.5–1.4)
ERYTHROCYTE [DISTWIDTH] IN BLOOD BY AUTOMATED COUNT: 13 % (ref 11.5–14.5)
GFR SERPLBLD CREATININE-BSD FMLA CKD-EPI: >60 ML/MIN/1.73/M2
GLUCOSE SERPL-MCNC: 92 MG/DL (ref 70–110)
HCT VFR BLD AUTO: 38.2 % (ref 37–48.5)
HDLC SERPL-MCNC: 62 MG/DL (ref 40–75)
HDLC SERPL: 34.4 % (ref 20–50)
HGB BLD-MCNC: 13.2 GM/DL (ref 12–16)
IMM GRANULOCYTES # BLD AUTO: 0.01 K/UL (ref 0–0.04)
IMM GRANULOCYTES NFR BLD AUTO: 0.2 % (ref 0–0.5)
LDLC SERPL CALC-MCNC: 106.2 MG/DL (ref 63–159)
LYMPHOCYTES # BLD AUTO: 1.07 K/UL (ref 1–4.8)
MCH RBC QN AUTO: 30.5 PG (ref 27–31)
MCHC RBC AUTO-ENTMCNC: 34.6 G/DL (ref 32–36)
MCV RBC AUTO: 88 FL (ref 82–98)
NONHDLC SERPL-MCNC: 118 MG/DL
NUCLEATED RBC (/100WBC) (OHS): 0 /100 WBC
PLATELET # BLD AUTO: 236 K/UL (ref 150–450)
PMV BLD AUTO: 10 FL (ref 9.2–12.9)
POTASSIUM SERPL-SCNC: 4.4 MMOL/L (ref 3.5–5.1)
PROT SERPL-MCNC: 6.7 GM/DL (ref 6–8.4)
RBC # BLD AUTO: 4.33 M/UL (ref 4–5.4)
RELATIVE EOSINOPHIL (OHS): 5.5 %
RELATIVE LYMPHOCYTE (OHS): 24.5 % (ref 18–48)
RELATIVE MONOCYTE (OHS): 9.6 % (ref 4–15)
RELATIVE NEUTROPHIL (OHS): 59.5 % (ref 38–73)
SODIUM SERPL-SCNC: 142 MMOL/L (ref 136–145)
T4 FREE SERPL-MCNC: 0.95 NG/DL (ref 0.71–1.51)
TRIGL SERPL-MCNC: 59 MG/DL (ref 30–150)
TSH SERPL-ACNC: 7.44 UIU/ML (ref 0.4–4)
WBC # BLD AUTO: 4.37 K/UL (ref 3.9–12.7)

## 2025-07-14 PROCEDURE — 36415 COLL VENOUS BLD VENIPUNCTURE: CPT | Mod: HCNC,PO

## 2025-07-14 PROCEDURE — 84439 ASSAY OF FREE THYROXINE: CPT | Mod: HCNC

## 2025-07-14 PROCEDURE — 85025 COMPLETE CBC W/AUTO DIFF WBC: CPT | Mod: HCNC

## 2025-07-14 PROCEDURE — 82040 ASSAY OF SERUM ALBUMIN: CPT | Mod: HCNC

## 2025-07-14 PROCEDURE — 82465 ASSAY BLD/SERUM CHOLESTEROL: CPT | Mod: HCNC

## 2025-07-14 PROCEDURE — 84443 ASSAY THYROID STIM HORMONE: CPT | Mod: HCNC

## 2025-07-15 ENCOUNTER — PATIENT MESSAGE (OUTPATIENT)
Dept: FAMILY MEDICINE | Facility: CLINIC | Age: 70
End: 2025-07-15
Payer: MEDICARE

## 2025-07-17 ENCOUNTER — HOSPITAL ENCOUNTER (OUTPATIENT)
Dept: RADIOLOGY | Facility: HOSPITAL | Age: 70
Discharge: HOME | End: 2025-07-17
Attending: NURSE PRACTITIONER
Payer: MEDICARE

## 2025-07-17 VITALS — BODY MASS INDEX: 21.46 KG/M2 | WEIGHT: 125 LBS

## 2025-07-17 DIAGNOSIS — Z12.31 ENCOUNTER FOR SCREENING MAMMOGRAM FOR MALIGNANT NEOPLASM OF BREAST: ICD-10-CM

## 2025-07-17 PROCEDURE — 77067 SCR MAMMO BI INCL CAD: CPT | Mod: TC,HCNC

## 2025-07-21 ENCOUNTER — OFFICE VISIT (OUTPATIENT)
Dept: FAMILY MEDICINE | Facility: CLINIC | Age: 70
End: 2025-07-21
Payer: MEDICARE

## 2025-07-21 VITALS
HEIGHT: 64 IN | SYSTOLIC BLOOD PRESSURE: 133 MMHG | BODY MASS INDEX: 22.16 KG/M2 | OXYGEN SATURATION: 98 % | HEART RATE: 60 BPM | DIASTOLIC BLOOD PRESSURE: 84 MMHG | WEIGHT: 129.81 LBS

## 2025-07-21 DIAGNOSIS — E03.8 SUBCLINICAL HYPOTHYROIDISM: Primary | ICD-10-CM

## 2025-07-21 PROCEDURE — 3008F BODY MASS INDEX DOCD: CPT | Mod: CPTII,HCNC,S$GLB, | Performed by: NURSE PRACTITIONER

## 2025-07-21 PROCEDURE — 1160F RVW MEDS BY RX/DR IN RCRD: CPT | Mod: CPTII,HCNC,S$GLB, | Performed by: NURSE PRACTITIONER

## 2025-07-21 PROCEDURE — 3288F FALL RISK ASSESSMENT DOCD: CPT | Mod: CPTII,HCNC,S$GLB, | Performed by: NURSE PRACTITIONER

## 2025-07-21 PROCEDURE — 99213 OFFICE O/P EST LOW 20 MIN: CPT | Mod: HCNC,S$GLB,, | Performed by: NURSE PRACTITIONER

## 2025-07-21 PROCEDURE — 3075F SYST BP GE 130 - 139MM HG: CPT | Mod: CPTII,HCNC,S$GLB, | Performed by: NURSE PRACTITIONER

## 2025-07-21 PROCEDURE — 1126F AMNT PAIN NOTED NONE PRSNT: CPT | Mod: CPTII,HCNC,S$GLB, | Performed by: NURSE PRACTITIONER

## 2025-07-21 PROCEDURE — 1159F MED LIST DOCD IN RCRD: CPT | Mod: CPTII,HCNC,S$GLB, | Performed by: NURSE PRACTITIONER

## 2025-07-21 PROCEDURE — 99999 PR PBB SHADOW E&M-EST. PATIENT-LVL III: CPT | Mod: PBBFAC,HCNC,, | Performed by: NURSE PRACTITIONER

## 2025-07-21 PROCEDURE — 3079F DIAST BP 80-89 MM HG: CPT | Mod: CPTII,HCNC,S$GLB, | Performed by: NURSE PRACTITIONER

## 2025-07-21 PROCEDURE — 1101F PT FALLS ASSESS-DOCD LE1/YR: CPT | Mod: CPTII,HCNC,S$GLB, | Performed by: NURSE PRACTITIONER

## 2025-07-21 NOTE — PROGRESS NOTES
"Subjective:       Patient ID: Niya Durbin is a 70 y.o. female.    Chief Complaint: Results  Niya Durbin presents today for follow up of elevated TSH. Last seen in 7/11/2025.    HPI per ROS.     Problem List[1]    Current Medications[2]    The following portions of the patient's history were reviewed and updated as appropriate: allergies, past family history, past medical history, past social history and past surgical history.    Review of Systems   Constitutional:  Negative for appetite change, fatigue, fever and unexpected weight change.   HENT:  Negative for hearing loss, rhinorrhea, sneezing, sore throat and trouble swallowing.    Eyes:  Negative for visual disturbance.   Respiratory:  Negative for cough, shortness of breath and wheezing.    Cardiovascular:  Negative for chest pain and palpitations.   Gastrointestinal:  Negative for abdominal pain, constipation, diarrhea, nausea and vomiting.   Genitourinary:  Negative for difficulty urinating, dysuria, frequency and hematuria.   Musculoskeletal:  Negative for arthralgias and myalgias.   Neurological:  Negative for dizziness, weakness and numbness.   Psychiatric/Behavioral:  Negative for sleep disturbance. The patient is not nervous/anxious.        Objective:      /84 (BP Location: Right arm, Patient Position: Sitting)   Pulse 60   Ht 5' 4" (1.626 m)   Wt 58.9 kg (129 lb 12.8 oz)   SpO2 98%   BMI 22.28 kg/m²     Physical Exam  Constitutional:       General: She is not in acute distress.     Appearance: Normal appearance.   Cardiovascular:      Rate and Rhythm: Normal rate and regular rhythm.      Pulses: Normal pulses.      Heart sounds: Normal heart sounds.   Pulmonary:      Effort: Pulmonary effort is normal.      Breath sounds: Normal breath sounds.   Musculoskeletal:         General: Normal range of motion.   Skin:     General: Skin is warm and dry.   Neurological:      Mental Status: She is alert and oriented to person, place, and " "time.   Psychiatric:         Mood and Affect: Mood normal.         Behavior: Behavior normal.         Assessment:       1. Subclinical hypothyroidism        Plan:   Niya Cannon" was seen today for results.    Diagnoses and all orders for this visit:    Subclinical hypothyroidism  -     TSH; Future      Repeat TSH 8 weeks.          [1]   Patient Active Problem List  Diagnosis    Subclinical hypothyroidism    Osteoporosis    History of colon polyps    History of pelvic fracture    Vitamin D insufficiency    Fatigue    Elevated BP without diagnosis of hypertension    Closed fracture of upper end of left tibia with routine healing    Decreased range of motion (ROM) of left knee    Muscle weakness of lower extremity    Chronic deep vein thrombosis (DVT) of left peroneal vein    Intermittent esotropia of both eyes    Regular astigmatism of both eyes   [2]   Current Outpatient Medications:     ascorbic acid, vitamin C, (VITAMIN C) 100 MG tablet, Take 100 mg by mouth once daily., Disp: , Rfl:     COMIRNATY 2023-24, 12Y UP,,PF, 30 mcg/0.3 mL inection, , Disp: , Rfl:     denosumab (PROLIA) 60 mg/mL Syrg, Inject 60 mg into the skin., Disp: , Rfl:     fluocinonide (LIDEX) 0.05 % ointment, Apply to affected areas of hands bid prn rash, Disp: 60 g, Rfl: 3    triamcinolone acetonide 0.1% (KENALOG) 0.1 % cream, Apply to affected areas of hands and legs BID prn rash. Do not use on face, underarms, or groin., Disp: 80 g, Rfl: 3    vitamin D (VITAMIN D3) 1000 units Tab, Take 1,000 Units by mouth once daily. Regular Vit D 1000mg, Disp: , Rfl:     apixaban (ELIQUIS) 5 mg Tab, Take 1 tablet (5 mg total) by mouth 2 (two) times daily., Disp: 60 tablet, Rfl: 4    gabapentin (NEURONTIN) 100 MG capsule, Take 1 capsule (100 mg total) by mouth 3 (three) times daily. (Patient taking differently: Take 100 mg by mouth 2 (two) times daily.), Disp: 90 capsule, Rfl: 11  No current facility-administered medications for this " visit.    Facility-Administered Medications Ordered in Other Visits:     0.9%  NaCl infusion, , Intravenous, Continuous, Santo Hoyt MD, Stopped at 09/26/19 1020    sodium chloride 0.9% flush 10 mL, 10 mL, Intravenous, PRN, Santo Hoyt MD

## (undated) DEVICE — TRAY FOLEY 16FR INFECTION CONT

## (undated) DEVICE — BANDAGE ESMARK 6X12

## (undated) DEVICE — BNDG COFLEX FOAM LF2 ST 4X5YD

## (undated) DEVICE — BANDAGE MATRIX HK LOOP 6IN 5YD

## (undated) DEVICE — IMPLANTABLE DEVICE
Type: IMPLANTABLE DEVICE | Site: TIBIA | Status: NON-FUNCTIONAL
Removed: 2022-04-29

## (undated) DEVICE — MASK FLYTE HOOD PEEL AWAY

## (undated) DEVICE — DRESSING GAUZE XEROFORM 5X9

## (undated) DEVICE — DRESSING TRANS 4X4 3/4

## (undated) DEVICE — TRAY MINOR ORTHO

## (undated) DEVICE — DRAPE C ARM 42 X 120 10/BX

## (undated) DEVICE — BANDAGE ACE DOUBLE STER 6IN

## (undated) DEVICE — DRAPE INCISE IOBAN 2 23X17IN

## (undated) DEVICE — SUT ETHIBOND XTRA2 OS-4 30

## (undated) DEVICE — ADHESIVE DERMABOND ADVANCED

## (undated) DEVICE — SEE MEDLINE ITEM 157131

## (undated) DEVICE — APPLICATOR CHLORAPREP ORN 26ML

## (undated) DEVICE — DRAPE STERI U-SHAPED 47X51IN

## (undated) DEVICE — SUT MONOCRYL PLUS UD 3-0 27

## (undated) DEVICE — PAD CAST SPECIALIST STRL 4

## (undated) DEVICE — SEE MEDLINE ITEM 157166

## (undated) DEVICE — BIT DRILL NON LOCK 2.5X216MM

## (undated) DEVICE — CATH SUCTION 10FR

## (undated) DEVICE — SUT VICRYL PLUS 2-0 CT1 18

## (undated) DEVICE — PADDING WYTEX UNDRCST 6INX4YD

## (undated) DEVICE — BIT DRILL LOCK SHORT 3.1X216 S

## (undated) DEVICE — SCALPEL #15 BLADE STRL DISP.

## (undated) DEVICE — GAUZE SPONGE 4X4 12PLY

## (undated) DEVICE — DRESSING AQUACEL AG RBBN 2X45

## (undated) DEVICE — DRAPE C-ARMOR EQUIPMENT COVER

## (undated) DEVICE — TUBE SUCTION YANKAUER

## (undated) DEVICE — 3.5 DRILL

## (undated) DEVICE — Device

## (undated) DEVICE — SEE MEDLINE ITEM 157150

## (undated) DEVICE — SUT ETHICON 3-0 BLK MONO PS

## (undated) DEVICE — SPONGE LAP 18X18 PREWASHED

## (undated) DEVICE — SUT VICRYL PLUS 0 CT1 18IN

## (undated) DEVICE — DRILL ASNIS III 2X150MM

## (undated) DEVICE — DRAPE PLASTIC U 60X72

## (undated) DEVICE — TAPE SURG DURAPORE 2 SGL USE

## (undated) DEVICE — TOURNIQUET SB QC DP 34X4IN

## (undated) DEVICE — ELECTRODE REM PLYHSV RETURN 9

## (undated) DEVICE — GUIDE DRILL AO 2.6X70MM

## (undated) DEVICE — TOWEL OR DISP STRL BLUE 4/PK